# Patient Record
Sex: FEMALE | Race: WHITE | NOT HISPANIC OR LATINO | URBAN - METROPOLITAN AREA
[De-identification: names, ages, dates, MRNs, and addresses within clinical notes are randomized per-mention and may not be internally consistent; named-entity substitution may affect disease eponyms.]

---

## 2020-11-07 ENCOUNTER — INPATIENT (INPATIENT)
Facility: HOSPITAL | Age: 25
LOS: 9 days | Discharge: HOME | DRG: 393 | End: 2020-11-17
Attending: FAMILY MEDICINE | Admitting: STUDENT IN AN ORGANIZED HEALTH CARE EDUCATION/TRAINING PROGRAM
Payer: COMMERCIAL

## 2020-11-08 ENCOUNTER — TRANSCRIPTION ENCOUNTER (OUTPATIENT)
Age: 25
End: 2020-11-08

## 2020-11-08 VITALS
HEIGHT: 62 IN | RESPIRATION RATE: 18 BRPM | WEIGHT: 175.05 LBS | TEMPERATURE: 98 F | HEART RATE: 105 BPM | SYSTOLIC BLOOD PRESSURE: 86 MMHG | OXYGEN SATURATION: 100 % | DIASTOLIC BLOOD PRESSURE: 62 MMHG

## 2020-11-08 DIAGNOSIS — K91.86 RETAINED CHOLELITHIASIS FOLLOWING CHOLECYSTECTOMY: ICD-10-CM

## 2020-11-08 DIAGNOSIS — Z90.49 ACQUIRED ABSENCE OF OTHER SPECIFIED PARTS OF DIGESTIVE TRACT: Chronic | ICD-10-CM

## 2020-11-08 DIAGNOSIS — Z29.9 ENCOUNTER FOR PROPHYLACTIC MEASURES, UNSPECIFIED: ICD-10-CM

## 2020-11-08 DIAGNOSIS — K80.50 CALCULUS OF BILE DUCT WITHOUT CHOLANGITIS OR CHOLECYSTITIS WITHOUT OBSTRUCTION: ICD-10-CM

## 2020-11-08 DIAGNOSIS — R74.8 ABNORMAL LEVELS OF OTHER SERUM ENZYMES: ICD-10-CM

## 2020-11-08 DIAGNOSIS — Z98.890 OTHER SPECIFIED POSTPROCEDURAL STATES: Chronic | ICD-10-CM

## 2020-11-08 DIAGNOSIS — E87.6 HYPOKALEMIA: ICD-10-CM

## 2020-11-08 DIAGNOSIS — R10.9 UNSPECIFIED ABDOMINAL PAIN: ICD-10-CM

## 2020-11-08 LAB
ALBUMIN SERPL ELPH-MCNC: 3.4 G/DL — SIGNIFICANT CHANGE UP (ref 3.3–5)
ALBUMIN SERPL ELPH-MCNC: 3.5 G/DL — SIGNIFICANT CHANGE UP (ref 3.3–5)
ALP SERPL-CCNC: 356 U/L — HIGH (ref 40–120)
ALP SERPL-CCNC: 364 U/L — HIGH (ref 40–120)
ALT FLD-CCNC: 412 U/L — HIGH (ref 12–78)
ALT FLD-CCNC: 428 U/L — HIGH (ref 12–78)
AMYLASE P1 CFR SERPL: 32 U/L — SIGNIFICANT CHANGE UP (ref 25–125)
ANION GAP SERPL CALC-SCNC: 6 MMOL/L — SIGNIFICANT CHANGE UP (ref 5–17)
ANION GAP SERPL CALC-SCNC: 8 MMOL/L — SIGNIFICANT CHANGE UP (ref 5–17)
APTT BLD: 36.6 SEC — HIGH (ref 27.5–35.5)
AST SERPL-CCNC: 112 U/L — HIGH (ref 15–37)
AST SERPL-CCNC: 126 U/L — HIGH (ref 15–37)
BILIRUB DIRECT SERPL-MCNC: 2.5 MG/DL — HIGH (ref 0.05–0.2)
BILIRUB INDIRECT FLD-MCNC: 0.4 MG/DL — SIGNIFICANT CHANGE UP (ref 0.2–1)
BILIRUB SERPL-MCNC: 2.9 MG/DL — HIGH (ref 0.2–1.2)
BILIRUB SERPL-MCNC: 2.9 MG/DL — HIGH (ref 0.2–1.2)
BILIRUB SERPL-MCNC: 3.4 MG/DL — HIGH (ref 0.2–1.2)
BLD GP AB SCN SERPL QL: SIGNIFICANT CHANGE UP
BUN SERPL-MCNC: 7 MG/DL — SIGNIFICANT CHANGE UP (ref 7–23)
BUN SERPL-MCNC: 8 MG/DL — SIGNIFICANT CHANGE UP (ref 7–23)
CALCIUM SERPL-MCNC: 8.5 MG/DL — SIGNIFICANT CHANGE UP (ref 8.5–10.1)
CALCIUM SERPL-MCNC: 8.9 MG/DL — SIGNIFICANT CHANGE UP (ref 8.5–10.1)
CHLORIDE SERPL-SCNC: 103 MMOL/L — SIGNIFICANT CHANGE UP (ref 96–108)
CHLORIDE SERPL-SCNC: 105 MMOL/L — SIGNIFICANT CHANGE UP (ref 96–108)
CO2 SERPL-SCNC: 26 MMOL/L — SIGNIFICANT CHANGE UP (ref 22–31)
CO2 SERPL-SCNC: 28 MMOL/L — SIGNIFICANT CHANGE UP (ref 22–31)
CREAT SERPL-MCNC: 0.69 MG/DL — SIGNIFICANT CHANGE UP (ref 0.5–1.3)
CREAT SERPL-MCNC: 0.92 MG/DL — SIGNIFICANT CHANGE UP (ref 0.5–1.3)
GLUCOSE SERPL-MCNC: 105 MG/DL — HIGH (ref 70–99)
GLUCOSE SERPL-MCNC: 133 MG/DL — HIGH (ref 70–99)
HCG SERPL-ACNC: <1 MIU/ML — SIGNIFICANT CHANGE UP
HCT VFR BLD CALC: 37.7 % — SIGNIFICANT CHANGE UP (ref 34.5–45)
HCT VFR BLD CALC: 39 % — SIGNIFICANT CHANGE UP (ref 34.5–45)
HGB BLD-MCNC: 12.9 G/DL — SIGNIFICANT CHANGE UP (ref 11.5–15.5)
HGB BLD-MCNC: 13 G/DL — SIGNIFICANT CHANGE UP (ref 11.5–15.5)
INR BLD: 1.02 RATIO — SIGNIFICANT CHANGE UP (ref 0.88–1.16)
LIDOCAIN IGE QN: 132 U/L — SIGNIFICANT CHANGE UP (ref 73–393)
MCHC RBC-ENTMCNC: 28.6 PG — SIGNIFICANT CHANGE UP (ref 27–34)
MCHC RBC-ENTMCNC: 28.8 PG — SIGNIFICANT CHANGE UP (ref 27–34)
MCHC RBC-ENTMCNC: 33.1 GM/DL — SIGNIFICANT CHANGE UP (ref 32–36)
MCHC RBC-ENTMCNC: 34.5 GM/DL — SIGNIFICANT CHANGE UP (ref 32–36)
MCV RBC AUTO: 83.4 FL — SIGNIFICANT CHANGE UP (ref 80–100)
MCV RBC AUTO: 86.5 FL — SIGNIFICANT CHANGE UP (ref 80–100)
NRBC # BLD: 0 /100 WBCS — SIGNIFICANT CHANGE UP (ref 0–0)
NRBC # BLD: 0 /100 WBCS — SIGNIFICANT CHANGE UP (ref 0–0)
PLATELET # BLD AUTO: 356 K/UL — SIGNIFICANT CHANGE UP (ref 150–400)
PLATELET # BLD AUTO: 358 K/UL — SIGNIFICANT CHANGE UP (ref 150–400)
POTASSIUM SERPL-MCNC: 3.3 MMOL/L — LOW (ref 3.5–5.3)
POTASSIUM SERPL-MCNC: 4 MMOL/L — SIGNIFICANT CHANGE UP (ref 3.5–5.3)
POTASSIUM SERPL-SCNC: 3.3 MMOL/L — LOW (ref 3.5–5.3)
POTASSIUM SERPL-SCNC: 4 MMOL/L — SIGNIFICANT CHANGE UP (ref 3.5–5.3)
PROT SERPL-MCNC: 7.1 G/DL — SIGNIFICANT CHANGE UP (ref 6–8.3)
PROT SERPL-MCNC: 7.5 G/DL — SIGNIFICANT CHANGE UP (ref 6–8.3)
PROTHROM AB SERPL-ACNC: 11.9 SEC — SIGNIFICANT CHANGE UP (ref 10.6–13.6)
RBC # BLD: 4.51 M/UL — SIGNIFICANT CHANGE UP (ref 3.8–5.2)
RBC # BLD: 4.52 M/UL — SIGNIFICANT CHANGE UP (ref 3.8–5.2)
RBC # FLD: 13.8 % — SIGNIFICANT CHANGE UP (ref 10.3–14.5)
RBC # FLD: 13.8 % — SIGNIFICANT CHANGE UP (ref 10.3–14.5)
SARS-COV-2 IGG SERPL QL IA: NEGATIVE — SIGNIFICANT CHANGE UP
SARS-COV-2 IGM SERPL IA-ACNC: 0.09 INDEX — SIGNIFICANT CHANGE UP
SARS-COV-2 RNA SPEC QL NAA+PROBE: SIGNIFICANT CHANGE UP
SODIUM SERPL-SCNC: 137 MMOL/L — SIGNIFICANT CHANGE UP (ref 135–145)
SODIUM SERPL-SCNC: 139 MMOL/L — SIGNIFICANT CHANGE UP (ref 135–145)
WBC # BLD: 9.2 K/UL — SIGNIFICANT CHANGE UP (ref 3.8–10.5)
WBC # BLD: 9.21 K/UL — SIGNIFICANT CHANGE UP (ref 3.8–10.5)
WBC # FLD AUTO: 9.2 K/UL — SIGNIFICANT CHANGE UP (ref 3.8–10.5)
WBC # FLD AUTO: 9.21 K/UL — SIGNIFICANT CHANGE UP (ref 3.8–10.5)

## 2020-11-08 PROCEDURE — 99223 1ST HOSP IP/OBS HIGH 75: CPT | Mod: GC,AI

## 2020-11-08 PROCEDURE — 76705 ECHO EXAM OF ABDOMEN: CPT | Mod: 26

## 2020-11-08 PROCEDURE — 99285 EMERGENCY DEPT VISIT HI MDM: CPT

## 2020-11-08 RX ORDER — HYDROMORPHONE HYDROCHLORIDE 2 MG/ML
0.5 INJECTION INTRAMUSCULAR; INTRAVENOUS; SUBCUTANEOUS ONCE
Refills: 0 | Status: DISCONTINUED | OUTPATIENT
Start: 2020-11-08 | End: 2020-11-08

## 2020-11-08 RX ORDER — POLYETHYLENE GLYCOL 3350 17 G/17G
17 POWDER, FOR SOLUTION ORAL DAILY
Refills: 0 | Status: DISCONTINUED | OUTPATIENT
Start: 2020-11-08 | End: 2020-11-09

## 2020-11-08 RX ORDER — HYDROMORPHONE HYDROCHLORIDE 2 MG/ML
0.5 INJECTION INTRAMUSCULAR; INTRAVENOUS; SUBCUTANEOUS EVERY 6 HOURS
Refills: 0 | Status: DISCONTINUED | OUTPATIENT
Start: 2020-11-08 | End: 2020-11-09

## 2020-11-08 RX ORDER — OXYCODONE HYDROCHLORIDE 5 MG/1
0 TABLET ORAL
Qty: 0 | Refills: 0 | DISCHARGE

## 2020-11-08 RX ORDER — SENNA PLUS 8.6 MG/1
2 TABLET ORAL AT BEDTIME
Refills: 0 | Status: DISCONTINUED | OUTPATIENT
Start: 2020-11-08 | End: 2020-11-09

## 2020-11-08 RX ORDER — ONDANSETRON 8 MG/1
4 TABLET, FILM COATED ORAL ONCE
Refills: 0 | Status: COMPLETED | OUTPATIENT
Start: 2020-11-08 | End: 2020-11-08

## 2020-11-08 RX ORDER — SODIUM CHLORIDE 9 MG/ML
1000 INJECTION, SOLUTION INTRAVENOUS
Refills: 0 | Status: DISCONTINUED | OUTPATIENT
Start: 2020-11-08 | End: 2020-11-09

## 2020-11-08 RX ORDER — SODIUM CHLORIDE 9 MG/ML
1000 INJECTION INTRAMUSCULAR; INTRAVENOUS; SUBCUTANEOUS ONCE
Refills: 0 | Status: COMPLETED | OUTPATIENT
Start: 2020-11-08 | End: 2020-11-08

## 2020-11-08 RX ORDER — HYDROMORPHONE HYDROCHLORIDE 2 MG/ML
1 INJECTION INTRAMUSCULAR; INTRAVENOUS; SUBCUTANEOUS EVERY 6 HOURS
Refills: 0 | Status: DISCONTINUED | OUTPATIENT
Start: 2020-11-08 | End: 2020-11-09

## 2020-11-08 RX ORDER — ONDANSETRON 8 MG/1
4 TABLET, FILM COATED ORAL EVERY 4 HOURS
Refills: 0 | Status: DISCONTINUED | OUTPATIENT
Start: 2020-11-08 | End: 2020-11-09

## 2020-11-08 RX ORDER — DEXTROSE MONOHYDRATE, SODIUM CHLORIDE, AND POTASSIUM CHLORIDE 50; .745; 4.5 G/1000ML; G/1000ML; G/1000ML
1000 INJECTION, SOLUTION INTRAVENOUS
Refills: 0 | Status: DISCONTINUED | OUTPATIENT
Start: 2020-11-08 | End: 2020-11-10

## 2020-11-08 RX ORDER — KETOROLAC TROMETHAMINE 30 MG/ML
15 SYRINGE (ML) INJECTION EVERY 6 HOURS
Refills: 0 | Status: DISCONTINUED | OUTPATIENT
Start: 2020-11-08 | End: 2020-11-09

## 2020-11-08 RX ADMIN — HYDROMORPHONE HYDROCHLORIDE 0.5 MILLIGRAM(S): 2 INJECTION INTRAMUSCULAR; INTRAVENOUS; SUBCUTANEOUS at 02:27

## 2020-11-08 RX ADMIN — ONDANSETRON 4 MILLIGRAM(S): 8 TABLET, FILM COATED ORAL at 00:17

## 2020-11-08 RX ADMIN — POLYETHYLENE GLYCOL 3350 17 GRAM(S): 17 POWDER, FOR SOLUTION ORAL at 11:42

## 2020-11-08 RX ADMIN — SENNA PLUS 2 TABLET(S): 8.6 TABLET ORAL at 21:26

## 2020-11-08 RX ADMIN — HYDROMORPHONE HYDROCHLORIDE 0.5 MILLIGRAM(S): 2 INJECTION INTRAMUSCULAR; INTRAVENOUS; SUBCUTANEOUS at 03:27

## 2020-11-08 RX ADMIN — SODIUM CHLORIDE 75 MILLILITER(S): 9 INJECTION, SOLUTION INTRAVENOUS at 17:25

## 2020-11-08 RX ADMIN — SODIUM CHLORIDE 1000 MILLILITER(S): 9 INJECTION INTRAMUSCULAR; INTRAVENOUS; SUBCUTANEOUS at 02:27

## 2020-11-08 RX ADMIN — DEXTROSE MONOHYDRATE, SODIUM CHLORIDE, AND POTASSIUM CHLORIDE 80 MILLILITER(S): 50; .745; 4.5 INJECTION, SOLUTION INTRAVENOUS at 05:35

## 2020-11-08 RX ADMIN — HYDROMORPHONE HYDROCHLORIDE 1 MILLIGRAM(S): 2 INJECTION INTRAMUSCULAR; INTRAVENOUS; SUBCUTANEOUS at 05:16

## 2020-11-08 RX ADMIN — HYDROMORPHONE HYDROCHLORIDE 0.5 MILLIGRAM(S): 2 INJECTION INTRAMUSCULAR; INTRAVENOUS; SUBCUTANEOUS at 01:50

## 2020-11-08 RX ADMIN — SODIUM CHLORIDE 1000 MILLILITER(S): 9 INJECTION INTRAMUSCULAR; INTRAVENOUS; SUBCUTANEOUS at 00:18

## 2020-11-08 RX ADMIN — Medication 15 MILLIGRAM(S): at 15:05

## 2020-11-08 RX ADMIN — HYDROMORPHONE HYDROCHLORIDE 0.5 MILLIGRAM(S): 2 INJECTION INTRAMUSCULAR; INTRAVENOUS; SUBCUTANEOUS at 05:15

## 2020-11-08 RX ADMIN — Medication 15 MILLIGRAM(S): at 15:26

## 2020-11-08 RX ADMIN — HYDROMORPHONE HYDROCHLORIDE 1 MILLIGRAM(S): 2 INJECTION INTRAMUSCULAR; INTRAVENOUS; SUBCUTANEOUS at 07:23

## 2020-11-08 RX ADMIN — HYDROMORPHONE HYDROCHLORIDE 0.5 MILLIGRAM(S): 2 INJECTION INTRAMUSCULAR; INTRAVENOUS; SUBCUTANEOUS at 01:52

## 2020-11-08 RX ADMIN — Medication 15 MILLIGRAM(S): at 07:40

## 2020-11-08 RX ADMIN — HYDROMORPHONE HYDROCHLORIDE 0.5 MILLIGRAM(S): 2 INJECTION INTRAMUSCULAR; INTRAVENOUS; SUBCUTANEOUS at 00:15

## 2020-11-08 RX ADMIN — Medication 15 MILLIGRAM(S): at 09:00

## 2020-11-08 NOTE — PROGRESS NOTE ADULT - ASSESSMENT
24 YO F with PMHX cholestasis of pregnancy (2 years ago), cholecystitis s/p cholecystectomy (POD#2), admit for 5mm retained CBD stone.

## 2020-11-08 NOTE — ED PROVIDER NOTE - PSH
History of cholecystectomy     History of cholecystectomy    S/P D&C (status post dilation and curettage)  x2 for miscarriages

## 2020-11-08 NOTE — H&P ADULT - NSHPOUTPATIENTPROVIDERS_GEN_ALL_CORE
Dr. Brittani Corey Dr. Brittani Corey  [facility] ([place]) (patient unsure of name: Dr. Marroquin, female physician)  [facility] Dr. Brittani Coles([place]) (patient unsure of name: Dr. Marroquin, female physician)  [facility] Dr. Brittani Coles ([place]) (patient unsure of name: Dr. Marroquin, female physician)  [facility]

## 2020-11-08 NOTE — ED ADULT NURSE REASSESSMENT NOTE - NS ED NURSE REASSESS COMMENT FT1
pt aox4, " rt upper q abd pain, S/P gallbladder surgery 2 days ago" lungs clear, abd soft, + sounds, IVF infusing well via IV Pump,  pain 6/10, medicated, NPO, voiding

## 2020-11-08 NOTE — ED ADULT NURSE NOTE - PMH
No pertinent past medical history <<----- Click to add NO pertinent Past Medical History Area L Indication Text: Tumors in this location are included in Area L (trunk and extremities).  Mohs surgery is indicated for larger tumors, 2 cm or larger, in these anatomic locations.

## 2020-11-08 NOTE — H&P ADULT - NSHPREVIEWOFSYSTEMS_GEN_ALL_CORE
CONSTITUTIONAL: admits to fever, chills, dehydration, weakness  HEENT:  admits lightheadedness, dizziness, denies blurred visions, sore throat  SKIN: denies new lesions, rash  CARDIOVASCULAR: denies chest pain, chest pressure, palpitations  RESPIRATORY: denies shortness of breath, cough, sputum production  GASTROINTESTINAL: admits to nausea, vomiting, constipation, RUQ abdominal pain  GENITOURINARY: denies dysuria, frequency, hematuria  NEUROLOGICAL: denies numbness, headache, focal weakness  MUSCULOSKELETAL: denies new joint pain, muscle aches  HEMATOLOGIC: denies gross bleeding, bruising CONSTITUTIONAL: admits to chills, dehydration, weakness  HEENT:  admits lightheadedness, dizziness, denies blurred visions, sore throat  SKIN: denies new lesions, rash  CARDIOVASCULAR: denies chest pain, chest pressure, palpitations  RESPIRATORY: denies shortness of breath, cough, sputum production  GASTROINTESTINAL: admits to nausea, vomiting, constipation, RUQ abdominal pain  GENITOURINARY: denies dysuria, frequency, hematuria  NEUROLOGICAL: denies numbness, headache, focal weakness  MUSCULOSKELETAL: denies new joint pain, muscle aches  HEMATOLOGIC: denies gross bleeding, bruising

## 2020-11-08 NOTE — H&P ADULT - NSICDXPASTSURGICALHX_GEN_ALL_CORE_FT
PAST SURGICAL HISTORY:  History of cholecystectomy      PAST SURGICAL HISTORY:  History of cholecystectomy     S/P D&C (status post dilation and curettage) x2 for miscarriages

## 2020-11-08 NOTE — CONSULT NOTE ADULT - SUBJECTIVE AND OBJECTIVE BOX
Chief Complaint:  Patient is a 25y old  Female who presents with a chief complaint of retained CBD stone s/p cholecystectomy (08 Nov 2020 09:17)    H/O cholecystitis    History of cholestasis during pregnancy    No pertinent past medical history    S/P D&amp;C (status post dilation and curettage)    History of cholecystectomy       HPI:  24 YO F with PMHX cholestasis of pregnancy (2 years ago), cholecystitis s/p cholecystectomy (POD#2) presenting to the ER with acute RUQ pain since 1700. Pt states she had RUQ pain associated with uncontrolled vomiting x 1 month, underwent cholecystectomy 2 days ago at [facility]. Pt's RUQ pain did not subside after surgery, was associated with b/l flank pain, pain acutely worsened at 1700 last night, was on and off, sharp. Associated with non bilious non bloody emesis multiple times until pt began dry heaving. Pt took oxy 5 mg at approximately 1800 however vomited it as well. Pt admits to feeling very dehydrated, has not been able to keep down water, admits to associated lightheadedness. Pt states she also feels constipated, last BM 4 days ago, pt is passing gas. Last oral intake was a banana and toast which she vomited. Pt admits to associated chills. Pt denies sick contacts, sore throat, ear pain, muscle aches, CP SOB, cough. Of note, pt states she has a baseline BP ~110/60.     ED vitals afebrile, tachycardic , 86/62 after 1L IVF bolus BP increased to 121/65.   Labs significant for K 3.3, Bili 3.4, Alk P 356, , . US GB shows a 5 mm calculus in the region of the proximal common bile duct/cystic duct remnant which may represent retained stone.  Pt received 1L IVF bolus, Dilaudid .5 mg x 3, zofran 4 mg IV x1.    (08 Nov 2020 03:16)      cephalosporins (Rash (Moderate))  Cipro (Rash (Moderate))      HYDROmorphone  Injectable 1 milliGRAM(s) IV Push every 6 hours PRN  HYDROmorphone  Injectable 0.5 milliGRAM(s) IV Push every 6 hours PRN  ketorolac   Injectable 15 milliGRAM(s) IV Push every 6 hours PRN  lactated ringers. 1000 milliLiter(s) IV Continuous <Continuous>  ondansetron Injectable 4 milliGRAM(s) IV Push every 4 hours PRN  polyethylene glycol 3350 17 Gram(s) Oral daily  senna 2 Tablet(s) Oral at bedtime  sodium chloride 0.9% with potassium chloride 20 mEq/L 1000 milliLiter(s) IV Continuous <Continuous>        FAMILY HISTORY:  No pertinent family history in first degree relatives          Review of Systems:    General:  No wt loss, fevers, chills, night sweats,fatigue,   Eyes:  Good vision, no reported pain  ENT:  No sore throat, pain, runny nose, dysphagia  CV:  No pain, palpitatioins, hypo/hypertension  Resp:  No dyspnea, cough, tachypnea, wheezing  :  No pain, bleeding, incontinence, nocturia  Muscle:  No pain, weakness  Neuro:  No weakness, tingling, memory problems  Psych:  No fatigue, insomnia, mood problems, depression  Endocrine:  No polyuria, polydypsia, cold/heat intolerance  Heme:  No petechiae, ecchymosis, easy bruisability  Skin:  No rash, tattoos, scars, edema    Relevant Family History:       Relevant Social History:       Physical Exam:    Vital Signs:  Vital Signs Last 24 Hrs  T(C): 36.9 (08 Nov 2020 12:53), Max: 36.9 (08 Nov 2020 04:00)  T(F): 98.4 (08 Nov 2020 12:53), Max: 98.4 (08 Nov 2020 04:00)  HR: 90 (08 Nov 2020 12:53) (68 - 105)  BP: 96/64 (08 Nov 2020 12:53) (86/62 - 122/72)  BP(mean): --  RR: 17 (08 Nov 2020 12:53) (16 - 18)  SpO2: 92% (08 Nov 2020 12:53) (92% - 100%)  Daily Height in cm: 157.48 (08 Nov 2020 00:05)    Daily     General:  Appears stated age, well-groomed, well-nourished, no distress  HEENT:  NC/AT,  conjunctivae clear and pink, no thyromegaly, nodules, adenopathy, no JVD  Chest:  Full & symmetric excursion, no increased effort, breath sounds clear  Cardiovascular:  Regular rhythm, S1, S2, no murmur/rub/S3/S4, no abdominal bruit, no edema  Abdomen:  Soft, non-tender, non-distended, normoactive bowel sounds,  no masses ,no hepatosplenomeagaly, no signs of chronic liver disease  Extremities:  no cyanosis,clubbing or edema  Skin:  No rash/erythema/ecchymoses/petechiae/wounds/abscess/warm/dry  Neuro/Psych:  Alert, oriented, no asterixis, no tremor, no encephalopathy    Laboratory:                            12.9   9.21  )-----------( 356      ( 08 Nov 2020 05:18 )             39.0     11-08    139  |  105  |  7   ----------------------------<  105<H>  4.0   |  28  |  0.69    Ca    8.5      08 Nov 2020 05:18    TPro  7.1  /  Alb  3.4  /  TBili  2.9<H>  /  DBili  2.50<H>  /  AST  126<H>  /  ALT  412<H>  /  AlkPhos  364<H>  11-08    LIVER FUNCTIONS - ( 08 Nov 2020 05:18 )  Alb: 3.4 g/dL / Pro: 7.1 g/dL / ALK PHOS: 364 U/L / ALT: 412 U/L / AST: 126 U/L / GGT: x           PT/INR - ( 08 Nov 2020 00:12 )   PT: 11.9 sec;   INR: 1.02 ratio         PTT - ( 08 Nov 2020 00:12 )  PTT:36.6 sec    Amylase Serum32      Lipase [id number]       Ammonia--    Imaging:

## 2020-11-08 NOTE — ED PROVIDER NOTE - OBJECTIVE STATEMENT
24 y/o female s/p LAP cholecystectomy,  days-2 at [place]  c/o upper abdominal pain abdominal pain, back pain with nausea and vomiting x 1 day no CP, no SOB, no Fever, no chills, no urinary symptoms, no GYN symptoms, no COVID, no GIB

## 2020-11-08 NOTE — ED PROVIDER NOTE - SIGNIFICANT NEGATIVE FINDINGS
no headache, no chest pain, no SOB, no palpitations, no fever, no chills, , no urinary symptoms, no GI bleeding. no neuro changes.

## 2020-11-08 NOTE — ED ADULT NURSE REASSESSMENT NOTE - NS ED NURSE REASSESS COMMENT FT1
0150 No vomiting episode after Zofran , reports new onset of pain 8/10.  Medicated with Dilaudid 0.5mg as prescribed.

## 2020-11-08 NOTE — H&P ADULT - NSHPSOCIALHISTORY_GEN_ALL_CORE
occasional weed 1x a month socially, etoh socially 1-2x a month, denies other drug use   lives with mother  full code  did not get flu shot, declined flu shot during current hospitalization

## 2020-11-08 NOTE — H&P ADULT - NSHPPHYSICALEXAM_GEN_ALL_CORE
Vital Signs Last 24 Hrs  T(C): 36.8 (08 Nov 2020 00:05), Max: 36.8 (08 Nov 2020 00:05)  T(F): 98.2 (08 Nov 2020 00:05), Max: 98.2 (08 Nov 2020 00:05)  HR: 74 (08 Nov 2020 00:10) (74 - 105)  BP: 121/65 (08 Nov 2020 00:10) (86/62 - 121/65)  BP(mean): --  RR: 18 (08 Nov 2020 00:10) (18 - 18)  SpO2: 100% (08 Nov 2020 00:10) (100% - 100%)    Physical Exam:  General: Well developed, appears mildly uncomfortable  HEENT: Normocephallic Atraumatic, PERRLA, EOMI bl, dry mucous membranes   Neck: Supple, nontender, no cervical lymphadenopathy  Neurology: A&Ox3, no focal neurological deficits: CNII-XII intact  Respiratory: Clear To Auscultation B/L, No Wheezes, rhonchi or rales  CV: Regular Rate and Rhythm, +S1/S2, no murmurs, rubs or gallops  Abdominal: TTP RUQ, mild TTP RLQ>LLQ, three areas of ecchymoses in RUQ 1 cm x 1 cm, soft, Non-Distended decreased bowel soundsx4  Extremities: No Clubbing, cyanosis or edema, + peripheral pulses: cap refill <2 secs LE bilaterally  Skin: warm, dry, normal color

## 2020-11-08 NOTE — H&P ADULT - PROBLEM SELECTOR PLAN 1
abdominal pain 2/2 5mm retained CBD stone POD# 2 s/p cholecystectomy, with elevated total BR 3.4  s/p 1L IVF bolus, Dilaudid .5 mg x 3, zofran 4 mg IV x1  zofran 4 mg q6 PRN for nausea and or vomiting   NPO for likely ERCP tomorrow  start IVF rate 80 mL/hr with K added for 8 hours   ketorolac 15 mg IVP q 6 for mild pain, Dilaudid 1 mg q 6 hours PRN for moderate pain, Dilaudid 2 mg PRN for severe pain   consult surgery Dr. Kenney, consulted by ER   consult GI Dr. Nascimento -abdominal pain 2/2 5mm retained CBD stone POD# 2 s/p cholecystectomy, with elevated total BR 3.4  -US gallbladder 5 mm calculus in the region of the proximal common bile duct/cystic duct remnant which may represent retained stone.  -s/p 1L IVF bolus, Dilaudid .5 mg x 3, zofran 4 mg IV x1  -zofran 4 mg q6 PRN for nausea and or vomiting   -NPO for likely ERCP tomorrow  -start IVF rate 80 mL/hr with K added for 8 hours   -ketorolac 15 mg IVP q 6 for mild pain, Dilaudid 1 mg q 6 hours PRN for moderate pain, Dilaudid 2 mg PRN for severe pain   -consult surgery Dr. Kenney, consulted by ER   -consult GI Dr. Parnell -abdominal pain 2/2 5mm retained CBD stone POD# 2 s/p cholecystectomy, with elevated total BR 3.4  -US gallbladder 5 mm calculus in the region of the proximal common bile duct/cystic duct remnant which may represent retained stone.  -s/p 1L IVF bolus, Dilaudid .5 mg x 3, zofran 4 mg IV x1  -zofran 4 mg q6 PRN for nausea and or vomiting   -NPO for likely ERCP tomorrow  -start IVF rate 80 mL/hr with K added for 12 hours   -ketorolac 15 mg IVP q 6 for mild pain, Dilaudid .5 mg q 6 hours PRN for moderate pain, Dilaudid 1 mg PRN for severe pain   -consult surgery Dr. Kenney, consulted by ER   -consult GI Dr. Parnell -abdominal pain 2/2 5mm retained CBD stone POD# 2 s/p cholecystectomy, with elevated total BR 3.4  -US gallbladder 5 mm calculus in the region of the proximal common bile duct/cystic duct remnant which may represent retained stone.  -s/p 1L IVF bolus, Dilaudid .5 mg x 3, zofran 4 mg IV x1  -zofran 4 mg q6 PRN for nausea and or vomiting   -NPO for likely ERCP tomorrow  -start IVF rate 80 mL/hr with K added for 12 hours   -ketorolac 15 mg IVP q 6 for mild pain, Dilaudid .5 mg q 6 hours PRN for moderate pain, Dilaudid 1 mg PRN for severe pain   -consult surgery Dr. Kenney, consulted by ER   -consult GI Dr. Delgadillo -abdominal pain 2/2 5mm retained CBD stone POD# 2 s/p cholecystectomy, with elevated total bilirubin 3.4  -US gallbladder 5 mm calculus in the region of the proximal common bile duct/cystic duct remnant which may represent retained stone.  -s/p 1L IVF bolus, Dilaudid .5 mg x 3, zofran 4 mg IV x1  -zofran 4 mg q6 PRN for nausea and or vomiting   -NPO for likely ERCP tomorrow  -start IVF rate 80 mL/hr with K added for 12 hours   -ketorolac 15 mg IVP q 6 for mild pain, Dilaudid .5 mg q 6 hours PRN for moderate pain, Dilaudid 1 mg PRN for severe pain   -consult surgery Dr. Kenney, consulted by ER   -consulted GI Dr. Delgadillo

## 2020-11-08 NOTE — CONSULT NOTE ADULT - SUBJECTIVE AND OBJECTIVE BOX
DAVID DRISCOLL  MRN-[id number]  25y    THIS IS A 24 YO FEMALE WITH SIGNIFICANT PAST MEDICAL HISTORY OF CHOLECYSTITIS/ CHOLELITHIASIS S/P EMERGENT LAPAROSCOPIC CHOLECYSTECTOMY 11/06/2020 AT [facility] PRESENTING TO [facility] WITH RUQ SEVERE ABDOMINAL PAIN WITH NAUSEA. AFTER A MONTH OF ON/ OFF BILIARY COLIC SHE  UNDERWENT LAPAROSCOPIC CHOLECYSTECTOMY ON FRIDAY 11/06 AFTER HAVING A ABDOMINAL ULTRASOUND AND MRI AT [facility].    SHE ADMITS TO RUQ PAIN, NAUSEA. SHE DENIES ANY FEVER, CHILLS, JAUNDICE.          PAST MEDICAL & SURGICAL HISTORY:  H/O cholecystitis   History of cholestasis during pregnancy  S/P D&C (status post dilation and curettage) x2 for miscarriages  History of cholecystectomy 11/06/2020    Home Medications:  ibuprofen 200 mg oral tablet: 2 tab(s) orally every 6 hours, As Needed for pain  (08 Nov 2020 04:26)  oxyCODONE 5 mg oral tablet: 1 tab(s) orally every 6 hours, As Needed for pain  (08 Nov 2020 04:26)      Allergies  cephalosporins (Rash (Moderate))  Cipro (Rash (Moderate))    REVIEW OF SYSTEMS:    CONSTITUTIONAL: No weakness, fevers or chills  EYES/ENT: No visual changes;  No vertigo or throat pain   NECK: No pain or stiffness  RESPIRATORY: No cough, wheezing, hemoptysis; No shortness of breath  CARDIOVASCULAR: No chest pain or palpitations  GASTROINTESTINAL: SEE HPI  GENITOURINARY: No dysuria, frequency or hematuria  NEUROLOGICAL: No numbness or weakness  SKIN: No itching, rashes        Vital Signs (  T(C): 36.8 (11-08-20 @ 00:05), Max: 36.8 (11-08-20 @ 00:05)  HR: 74 (11-08-20 @ 00:10) (74 - 105)  BP: 121/65 (11-08-20 @ 00:10) (86/62 - 121/65)  RR: 18 (11-08-20 @ 00:10) (18 - 18)  SpO2: 100% (11-08-20 @ 00:10) (100% - 100%)  Daily Height in cm: 157.48 (08 Nov 2020 00:05)      PHYSICAL EXAM:    GENERAL: NAD  HEAD:  ATRAUMATIC, NORMOCEPHALIC  EYES: EOMI, PERRLA, CONJUNCTIVA AND SCLERA CLEAR   ENT: MOIST MUCOUS MEMBRANE   NECK: SUPPLE, NO JVD  CHEST/LUNG: CLEAR TO AUSCULTATION, NO W/R/R  HEART: REGULAR RATE, RHYTHM, NO MURMUR, RUBS, GALLOPS  ABDOMEN: UMBILICAL , EPIGASTRIC AND X2 RIGHT TROCAR SITE RECENT LAPAROSCOPIC CHOLECYSTECTOMY, + BS, SOFT, NOT DISTENDED, NO PALPABLE MASS, RUQ TENDERNESS,  NO GUARDING/ RIGIDITY. NO CVA  TENDERNESS   EXTREMITIES:  2+ PERIPHERAL PULSES, BRISK CAPILLARY REFILL. NO CLUBBING, CYANOSIS, OR EDEMA.   NERVOUS SYSTEM: ALERT AND ORIENTED X3, CLEAR SPEECH . NO DEFICITS   MUSCULOSKELETAL : FROM ALL 4 EXTREMITIES, FULL AND EQUAL STRENGTH   SKIN: NO RASH, INTACT                       13.0   9.20  )-----------( 358      ( 08 Nov 2020 00:12 )             37.7     08 Nov 2020 00:12    137    |  103    |  8      ----------------------------<  133    3.3     |  26     |  0.92     Ca    8.9        08 Nov 2020 00:12    TPro  7.5    /  Alb  3.5    /  TBili  3.4    /  DBili  x      /  AST  112    /  ALT  428    /  AlkPhos  356    08 Nov 2020 00:12    PT/INR - ( 08 Nov 2020 00:12 )   PT: 11.9 sec;   INR: 1.02 ratio     PTT - ( 08 Nov 2020 00:12 )  PTT:36.6 sec      EXAM:  US GALLBLADDER                       INTERPRETATION:  Clinical indication: Due to status post cholecystectomy. Right upper quadrant pain    TECHNIQUE: Gallbladder ultrasound was performed.    FINDINGS: Status post cholecystectomy. There is a 5 mm shadowing calculus in the region of the proximal common bile duct/cystic duct. This does not demonstrate mobility with decubitus position. No fluid collections.    IMPRESSION:    5 mm calculus in the region of the proximal common bile duct/cystic duct remnant which may represent retained stone.    TORO ALMARAZ MD; Attending Radiologist    ASSESSMENT AND PLAN     RETAINED STONE, CHOLEDOCHOLITHIASIS S/P LAPAROSCOPIC CHOLECYSTECTOMY 11/06/2020    NO SURGICAL INTERVENTION AT THIS POINT  NEED GI EVALUATION FOR MRCP, ERCP

## 2020-11-08 NOTE — CONSULT NOTE ADULT - ATTENDING COMMENTS
26 yo fem s/p lap eliza 2 days admitted with a retained CBD stone    Abd soft, NT  elevated lFTs    _GI consult for ERCP/ CBD stone removal

## 2020-11-08 NOTE — ED ADULT NURSE REASSESSMENT NOTE - NS ED NURSE REASSESS COMMENT FT1
0015 -[id number] Medicated with Dilaudid 0.5mg ivp, & Zofran given 4 mg ivp.  Pt is scheduled for Ultrasound Abdomen & Pelvis. Pt made aware

## 2020-11-08 NOTE — H&P ADULT - PROBLEM SELECTOR PLAN 3
Alk P 356, ,  in ER likely reactive in setting of retained CBD stone  monitor liver function, f/u CMP   avoid acetaminophen -Alk P 356, ,  in ER likely reactive in setting of retained CBD stone  -monitor liver function, f/u CMP   -avoid acetaminophen -Alk P 356, ,  in ER  in setting of retained CBD stone  -monitor liver function, f/u CMP   -avoid acetaminophen -Alk P 356, ,  in ER in setting of retained CBD stone  -monitor liver function, f/u CMP   -avoid acetaminophen

## 2020-11-08 NOTE — ED PROVIDER NOTE - CLINICAL SUMMARY MEDICAL DECISION MAKING FREE TEXT BOX
acute RUQ pain Post Op Day-2   sonogram  with retained stone,    admit to medicine , GI consult, surgical consult

## 2020-11-08 NOTE — H&P ADULT - PROBLEM SELECTOR PLAN 4
dvt ppx SCDs, encourage ambulation     IMPROVE VTE Individual Risk Assessment        RISK                                                          Points  [  ] Previous VTE                                                3  [  ] Thrombophilia                                             2  [  ] Lower limb paralysis                                   2        (unable to hold up >15 seconds)    [  ] Current Cancer                                            2         (within 6 months)  [  ] Immobilization > 24 hrs                              1  [  ] ICU/CCU stay > 24 hours                            1  [  ] Age > 60                                                    1  IMPROVE VTE Score __0_______

## 2020-11-08 NOTE — H&P ADULT - HISTORY OF PRESENT ILLNESS
26 YO F with PMHX cholecystitis s/p cholecystectomy (POD#2) presenting to the ER with acute RUQ pain since ___.      ED vitals afebrile, tachycardic , 86/62 after 1L IVF bolus BP increased to 121/65.   Labs significant for K 3.3, BR 3.4, Alk P 356, , . US GB shows a 5 mm calculus in the region of the proximal common bile duct/cystic duct remnant which may represent retained stone.  Pt received 1L IVF bolus, Dilaudid .5 mg x 3, zofran 4 mg IV x1.   EKG_________ 24 YO F with PMHX cholestasis of pregnancy (2 years ago), cholecystitis s/p cholecystectomy (POD#2) presenting to the ER with acute RUQ pain since 1700. Pt states she had RUQ pain associated with uncontrolled vomiting x 1 month, underwent cholecystectomy 2 days ago at [facility]. Pt's RUQ did not subside after surgery, was associated with b/l flank pain, pain acutely worsened at 1700 last night, was on and off, sharp. Associated with non bilious non bloody emesis multiple times until pt began dry heaving. Pt took oxy 5 mg at approximately 1800 however vomited it as well. Pt admits tp feeling very dehydrated, has not been able to keep down water, admits to associated lightheadedness. Pt states she also feels constipated, last BM 4 days ago, pt is passing gas. Last oral intake was a banana and toast which she vomited. Pt admits to associated fever, chills. Pt denies sick contacts, sore throat, ear pain, muscle aches, CP SOB, cough. Of note, pt states she has a baseline BP ~110/60.     ED vitals afebrile, tachycardic , 86/62 after 1L IVF bolus BP increased to 121/65.   Labs significant for K 3.3, BR 3.4, Alk P 356, , . US GB shows a 5 mm calculus in the region of the proximal common bile duct/cystic duct remnant which may represent retained stone.  Pt received 1L IVF bolus, Dilaudid .5 mg x 3, zofran 4 mg IV x1.    26 YO F with PMHX cholestasis of pregnancy (2 years ago), cholecystitis s/p cholecystectomy (POD#2) presenting to the ER with acute RUQ pain since 1700. Pt states she had RUQ pain associated with uncontrolled vomiting x 1 month, underwent cholecystectomy 2 days ago at [facility]. Pt's RUQ pain did not subside after surgery, was associated with b/l flank pain, pain acutely worsened at 1700 last night, was on and off, sharp. Associated with non bilious non bloody emesis multiple times until pt began dry heaving. Pt took oxy 5 mg at approximately 1800 however vomited it as well. Pt admits to feeling very dehydrated, has not been able to keep down water, admits to associated lightheadedness. Pt states she also feels constipated, last BM 4 days ago, pt is passing gas. Last oral intake was a banana and toast which she vomited. Pt admits to associated chills. Pt denies sick contacts, sore throat, ear pain, muscle aches, CP SOB, cough. Of note, pt states she has a baseline BP ~110/60.     ED vitals afebrile, tachycardic , 86/62 after 1L IVF bolus BP increased to 121/65.   Labs significant for K 3.3, Bili 3.4, Alk P 356, , . US GB shows a 5 mm calculus in the region of the proximal common bile duct/cystic duct remnant which may represent retained stone.  Pt received 1L IVF bolus, Dilaudid .5 mg x 3, zofran 4 mg IV x1.

## 2020-11-08 NOTE — PROGRESS NOTE ADULT - PROBLEM SELECTOR PLAN 1
-abdominal pain 2/2 5mm retained CBD stone POD# 3 s/p cholecystectomy at [place], with elevated total bilirubin 3.4 now downtrending  -US gallbladder 5 mm calculus in the region of the proximal common bile duct/cystic duct remnant which may represent retained stone.  -s/p 1L IVF bolus, Dilaudid .5 mg x 3, zofran 4 mg IV x1  -zofran 4 mg q6 PRN for nausea and or vomiting   -NPO for likely ERCP tomorrow  -start IVF rate 80 mL/hr with K added for 12 hours   -ketorolac 15 mg IVP q 6 for mild pain, Dilaudid .5 mg q 6 hours PRN for moderate pain, Dilaudid 1 mg PRN for severe pain   -consult surgery Dr. Kenney recs no surgical indication at this time  -consulted GI Dr. Ballard for mrcp vs ercp tomorrow

## 2020-11-08 NOTE — H&P ADULT - ASSESSMENT
26 YO F with PMHX cholecystitis s/p cholecystectomy admit for 5mm retained CBD stone. 24 YO F with PMHX cholestasis of pregnancy (2 years ago), cholecystitis s/p cholecystectomy (POD#2), admit for 5mm retained CBD stone.

## 2020-11-08 NOTE — ED PROVIDER NOTE - CARE PLAN
Principal Discharge DX:	Abdominal pain  Secondary Diagnosis:	Retained gallstones following open cholecystectomy

## 2020-11-08 NOTE — PROGRESS NOTE ADULT - SUBJECTIVE AND OBJECTIVE BOX
Patient is a 25y old  Female who presents with a chief complaint of retained CBD stone s/p cholecystectomy (08 Nov 2020 04:38)      INTERVAL HPI:  OVERNIGHT EVENTS:  T(F): 98 (11-08-20 @ 05:45), Max: 98.4 (11-08-20 @ 04:00)  HR: 74 (11-08-20 @ 07:10) (68 - 105)  BP: 121/69 (11-08-20 @ 07:10) (86/62 - 122/72)  RR: 16 (11-08-20 @ 07:10) (16 - 18)  SpO2: 99% (11-08-20 @ 07:10) (99% - 100%)  I&O's Summary      REVIEW OF SYSTEMS:  CONSTITUTIONAL: No fever, weight loss, or fatigue  EYES: No eye pain, visual disturbances, or discharge  ENMT:  No difficulty hearing, tinnitus, vertigo; No sinus or throat pain  NECK: No pain or stiffness  BREASTS: No pain, masses, or nipple discharge  RESPIRATORY: No cough, wheezing, chills or hemoptysis; No shortness of breath  CARDIOVASCULAR: No chest pain, palpitations, dizziness, or leg swelling  GASTROINTESTINAL: No abdominal or epigastric pain. No nausea, vomiting, or hematemesis; No diarrhea or constipation. No melena or hematochezia.  GENITOURINARY: No dysuria, frequency, hematuria, or incontinence  NEUROLOGICAL: No headaches, memory loss, loss of strength, numbness, or tremors  SKIN: No itching, burning, rashes, or lesions   LYMPH NODES: No enlarged glands  ENDOCRINE: No heat or cold intolerance; No hair loss  MUSCULOSKELETAL: No joint pain or swelling; No muscle, back, or extremity pain  PSYCHIATRIC: No depression, anxiety, mood swings, or difficulty sleeping  HEME/LYMPH: No easy bruising, or bleeding gums  ALLERY AND IMMUNOLOGIC: No hives or eczema    PHYSICAL EXAM:  GENERAL: NAD, well-groomed, well-developed  HEAD:  Atraumatic, Normocephalic  EYES: EOMI, PERRLA, conjunctiva and sclera clear  ENMT: No tonsillar erythema, exudates, or enlargement; Moist mucous membranes, Good dentition, No lesions  NECK: Supple, No JVD, Normal thyroid  NERVOUS SYSTEM:  Alert & Oriented X3, Good concentration; Motor Strength 5/5 B/L upper and lower extremities; DTRs 2+ intact and symmetric  CHEST/LUNG: Clear to percussion bilaterally; No rales, rhonchi, wheezing, or rubs  HEART: Regular rate and rhythm; No murmurs, rubs, or gallops  ABDOMEN: Soft, Nontender, Nondistended; Bowel sounds present  EXTREMITIES:  2+ Peripheral Pulses, No clubbing, cyanosis, or edema  LYMPH: No lymphadenopathy noted  SKIN: No rashes or lesions    LABS:                        12.9   9.21  )-----------( 356      ( 08 Nov 2020 05:18 )             39.0     11-08    139  |  105  |  7   ----------------------------<  105<H>  4.0   |  28  |  0.69    Ca    8.5      08 Nov 2020 05:18    TPro  7.1  /  Alb  3.4  /  TBili  2.9<H>  /  DBili  2.50<H>  /  AST  126<H>  /  ALT  412<H>  /  AlkPhos  364<H>  11-08    PT/INR - ( 08 Nov 2020 00:12 )   PT: 11.9 sec;   INR: 1.02 ratio         PTT - ( 08 Nov 2020 00:12 )  PTT:36.6 sec    CAPILLARY BLOOD GLUCOSE                  MEDICATIONS  (STANDING):  polyethylene glycol 3350 17 Gram(s) Oral daily  senna 2 Tablet(s) Oral at bedtime  sodium chloride 0.9% with potassium chloride 20 mEq/L 1000 milliLiter(s) (80 mL/Hr) IV Continuous <Continuous>    MEDICATIONS  (PRN):  HYDROmorphone  Injectable 1 milliGRAM(s) IV Push every 6 hours PRN Severe Pain (7 - 10)  HYDROmorphone  Injectable 0.5 milliGRAM(s) IV Push every 6 hours PRN Moderate Pain (4 - 6)  ketorolac   Injectable 15 milliGRAM(s) IV Push every 6 hours PRN Mild Pain (1 - 3)  ondansetron Injectable 4 milliGRAM(s) IV Push every 4 hours PRN Nausea and/or Vomiting       Patient is a 25y old  Female who presents with a chief complaint of retained CBD stone s/p cholecystectomy (08 Nov 2020 04:38)      INTERVAL HPI: Pt seen and examined. States she still has some pain with RUQ but pain med helps, denies any other acute complaints, states she is constipated has not gone in 4 days.     OVERNIGHT EVENTS: none noted  T(F): 98 (11-08-20 @ 05:45), Max: 98.4 (11-08-20 @ 04:00)  HR: 74 (11-08-20 @ 07:10) (68 - 105)  BP: 121/69 (11-08-20 @ 07:10) (86/62 - 122/72)  RR: 16 (11-08-20 @ 07:10) (16 - 18)  SpO2: 99% (11-08-20 @ 07:10) (99% - 100%)  I&O's Summary      REVIEW OF SYSTEMS:  CONSTITUTIONAL: No fever, weight loss, or fatigue  RESPIRATORY: No cough, wheezing, chills or hemoptysis; No shortness of breath  CARDIOVASCULAR: No chest pain, palpitations, dizziness, or leg swelling  GASTROINTESTINAL: RUQ abd pain, no nausea or vomiting, constipated for 4 days.  GENITOURINARY: No dysuria, frequency, hematuria, or incontinence  NEUROLOGICAL: No headaches, memory loss, loss of strength, numbness, or tremors  ENDOCRINE: No heat or cold intolerance; No hair loss  MUSCULOSKELETAL: No joint pain or swelling; No muscle, back, or extremity pain  PSYCHIATRIC: No depression, anxiety, mood swings, or difficulty sleeping      PHYSICAL EXAM:  GENERAL: NAD, well-groomed, obese  NERVOUS SYSTEM:  Alert & Oriented X3, Good concentration; Motor Strength 5/5 B/L upper and lower extremities  CHEST/LUNG: Clear to percussion bilaterally; No rales, rhonchi, wheezing, or rubs  HEART: Regular rate and rhythm; No murmurs, rubs, or gallops  ABDOMEN: Soft, Nontender, Nondistended; Bowel sounds hypoactive  EXTREMITIES:  2+ Peripheral Pulses, No clubbing, cyanosis, or edema  SKIN: No rashes or lesions    LABS:                        12.9   9.21  )-----------( 356      ( 08 Nov 2020 05:18 )             39.0     11-08    139  |  105  |  7   ----------------------------<  105<H>  4.0   |  28  |  0.69    Ca    8.5      08 Nov 2020 05:18    TPro  7.1  /  Alb  3.4  /  TBili  2.9<H>  /  DBili  2.50<H>  /  AST  126<H>  /  ALT  412<H>  /  AlkPhos  364<H>  11-08    PT/INR - ( 08 Nov 2020 00:12 )   PT: 11.9 sec;   INR: 1.02 ratio         PTT - ( 08 Nov 2020 00:12 )  PTT:36.6 sec    CAPILLARY BLOOD GLUCOSE                  MEDICATIONS  (STANDING):  polyethylene glycol 3350 17 Gram(s) Oral daily  senna 2 Tablet(s) Oral at bedtime  sodium chloride 0.9% with potassium chloride 20 mEq/L 1000 milliLiter(s) (80 mL/Hr) IV Continuous <Continuous>    MEDICATIONS  (PRN):  HYDROmorphone  Injectable 1 milliGRAM(s) IV Push every 6 hours PRN Severe Pain (7 - 10)  HYDROmorphone  Injectable 0.5 milliGRAM(s) IV Push every 6 hours PRN Moderate Pain (4 - 6)  ketorolac   Injectable 15 milliGRAM(s) IV Push every 6 hours PRN Mild Pain (1 - 3)  ondansetron Injectable 4 milliGRAM(s) IV Push every 4 hours PRN Nausea and/or Vomiting

## 2020-11-08 NOTE — H&P ADULT - PROBLEM SELECTOR PLAN 2
K 3.3 in ER likely 2/2 vomiting   replete with K 40 powder x 1  start IVF rate 80 mL/hr with K added for 8 hours   f/u CMP, replete as needed -K 3.3 in ER likely 2/2 vomiting   -hold off on oral K powder, unable to tolerate anything by mouth at this time   -start IVF rate 80 mL/hr with K added for 8 hours   -f/u CMP, replete as needed -K 3.3 in ER likely 2/2 vomiting   -hold off on oral K powder, unable to tolerate anything by mouth at this time   -start IVF rate 80 mL/hr with K added for 12 hours   -f/u CMP, replete as needed

## 2020-11-08 NOTE — ED ADULT NURSE NOTE - OBJECTIVE STATEMENT
A&OX4 c/c severe abdominal pain, & vomiting x 2 days s/post Laparoscopic Cholecystectomy. Pt crying, moaning, & retching. Steri strips to abdomen, right side upper & mid, redness. No respiratory distress.

## 2020-11-08 NOTE — ED ADULT NURSE REASSESSMENT NOTE - NS ED NURSE REASSESS COMMENT FT1
0535 Normal Saline 0.9 with KCL 20 mEq premixed started @80ml/ hr via pump.. Infusing well, reminded not to bend left arm. verbalized understanding.  Kept warm, several warm blankets provided. Ongoing monitoring maintained.

## 2020-11-09 ENCOUNTER — TRANSCRIPTION ENCOUNTER (OUTPATIENT)
Age: 25
End: 2020-11-09

## 2020-11-09 LAB
ALBUMIN SERPL ELPH-MCNC: 2.9 G/DL — LOW (ref 3.3–5)
ALP SERPL-CCNC: 404 U/L — HIGH (ref 40–120)
ALT FLD-CCNC: 357 U/L — HIGH (ref 12–78)
ANION GAP SERPL CALC-SCNC: 8 MMOL/L — SIGNIFICANT CHANGE UP (ref 5–17)
AST SERPL-CCNC: 136 U/L — HIGH (ref 15–37)
BASOPHILS # BLD AUTO: 0.05 K/UL — SIGNIFICANT CHANGE UP (ref 0–0.2)
BASOPHILS NFR BLD AUTO: 0.6 % — SIGNIFICANT CHANGE UP (ref 0–2)
BILIRUB SERPL-MCNC: 1.5 MG/DL — HIGH (ref 0.2–1.2)
BUN SERPL-MCNC: 11 MG/DL — SIGNIFICANT CHANGE UP (ref 7–23)
CALCIUM SERPL-MCNC: 8.9 MG/DL — SIGNIFICANT CHANGE UP (ref 8.5–10.1)
CHLORIDE SERPL-SCNC: 105 MMOL/L — SIGNIFICANT CHANGE UP (ref 96–108)
CO2 SERPL-SCNC: 25 MMOL/L — SIGNIFICANT CHANGE UP (ref 22–31)
CREAT SERPL-MCNC: 0.58 MG/DL — SIGNIFICANT CHANGE UP (ref 0.5–1.3)
EOSINOPHIL # BLD AUTO: 0.22 K/UL — SIGNIFICANT CHANGE UP (ref 0–0.5)
EOSINOPHIL NFR BLD AUTO: 2.8 % — SIGNIFICANT CHANGE UP (ref 0–6)
GLUCOSE SERPL-MCNC: 62 MG/DL — LOW (ref 70–99)
HCT VFR BLD CALC: 36.9 % — SIGNIFICANT CHANGE UP (ref 34.5–45)
HGB BLD-MCNC: 12.5 G/DL — SIGNIFICANT CHANGE UP (ref 11.5–15.5)
IMM GRANULOCYTES NFR BLD AUTO: 0.4 % — SIGNIFICANT CHANGE UP (ref 0–1.5)
LYMPHOCYTES # BLD AUTO: 2.41 K/UL — SIGNIFICANT CHANGE UP (ref 1–3.3)
LYMPHOCYTES # BLD AUTO: 30.3 % — SIGNIFICANT CHANGE UP (ref 13–44)
MCHC RBC-ENTMCNC: 28.7 PG — SIGNIFICANT CHANGE UP (ref 27–34)
MCHC RBC-ENTMCNC: 33.9 GM/DL — SIGNIFICANT CHANGE UP (ref 32–36)
MCV RBC AUTO: 84.8 FL — SIGNIFICANT CHANGE UP (ref 80–100)
MONOCYTES # BLD AUTO: 0.45 K/UL — SIGNIFICANT CHANGE UP (ref 0–0.9)
MONOCYTES NFR BLD AUTO: 5.7 % — SIGNIFICANT CHANGE UP (ref 2–14)
NEUTROPHILS # BLD AUTO: 4.79 K/UL — SIGNIFICANT CHANGE UP (ref 1.8–7.4)
NEUTROPHILS NFR BLD AUTO: 60.2 % — SIGNIFICANT CHANGE UP (ref 43–77)
NRBC # BLD: 0 /100 WBCS — SIGNIFICANT CHANGE UP (ref 0–0)
PLATELET # BLD AUTO: 323 K/UL — SIGNIFICANT CHANGE UP (ref 150–400)
POTASSIUM SERPL-MCNC: 3.7 MMOL/L — SIGNIFICANT CHANGE UP (ref 3.5–5.3)
POTASSIUM SERPL-SCNC: 3.7 MMOL/L — SIGNIFICANT CHANGE UP (ref 3.5–5.3)
PROCALCITONIN SERPL-MCNC: <0.05 — SIGNIFICANT CHANGE UP (ref 0–0.04)
PROT SERPL-MCNC: 6.4 G/DL — SIGNIFICANT CHANGE UP (ref 6–8.3)
RBC # BLD: 4.35 M/UL — SIGNIFICANT CHANGE UP (ref 3.8–5.2)
RBC # FLD: 14.1 % — SIGNIFICANT CHANGE UP (ref 10.3–14.5)
SODIUM SERPL-SCNC: 138 MMOL/L — SIGNIFICANT CHANGE UP (ref 135–145)
WBC # BLD: 7.95 K/UL — SIGNIFICANT CHANGE UP (ref 3.8–10.5)
WBC # FLD AUTO: 7.95 K/UL — SIGNIFICANT CHANGE UP (ref 3.8–10.5)

## 2020-11-09 PROCEDURE — 99233 SBSQ HOSP IP/OBS HIGH 50: CPT | Mod: GC

## 2020-11-09 RX ORDER — PANTOPRAZOLE SODIUM 20 MG/1
40 TABLET, DELAYED RELEASE ORAL
Refills: 0 | Status: DISCONTINUED | OUTPATIENT
Start: 2020-11-09 | End: 2020-11-10

## 2020-11-09 RX ORDER — SENNA PLUS 8.6 MG/1
2 TABLET ORAL AT BEDTIME
Refills: 0 | Status: DISCONTINUED | OUTPATIENT
Start: 2020-11-09 | End: 2020-11-12

## 2020-11-09 RX ORDER — TRAMADOL HYDROCHLORIDE 50 MG/1
25 TABLET ORAL ONCE
Refills: 0 | Status: DISCONTINUED | OUTPATIENT
Start: 2020-11-09 | End: 2020-11-10

## 2020-11-09 RX ADMIN — HYDROMORPHONE HYDROCHLORIDE 1 MILLIGRAM(S): 2 INJECTION INTRAMUSCULAR; INTRAVENOUS; SUBCUTANEOUS at 04:09

## 2020-11-09 RX ADMIN — SODIUM CHLORIDE 75 MILLILITER(S): 9 INJECTION, SOLUTION INTRAVENOUS at 06:47

## 2020-11-09 RX ADMIN — HYDROMORPHONE HYDROCHLORIDE 1 MILLIGRAM(S): 2 INJECTION INTRAMUSCULAR; INTRAVENOUS; SUBCUTANEOUS at 03:54

## 2020-11-09 RX ADMIN — Medication 30 MILLILITER(S): at 22:56

## 2020-11-09 NOTE — PROGRESS NOTE ADULT - ASSESSMENT
CHARTING IN PROGRESS    24 YO F with PMHX cholestasis of pregnancy (2 years ago), cholecystitis s/p cholecystectomy (POD#2), admit for 5mm retained CBD stone. 24 YO F with PMHX cholestasis of pregnancy (2 years ago), cholecystitis s/p cholecystectomy (POD#3), presented with abd pain and N/V postoperatively, admitted for 5mm retained CBD stone.

## 2020-11-09 NOTE — DISCHARGE NOTE PROVIDER - PROVIDER TOKENS
PROVIDER:[TOKEN:[[id number]],FOLLOWUP:[1 week]] PROVIDER:[TOKEN:[[id number]],FOLLOWUP:[1 week]],PROVIDER:[TOKEN:[[id number]],FOLLOWUP:[1 week]]

## 2020-11-09 NOTE — PROGRESS NOTE ADULT - PROBLEM SELECTOR PLAN 4
dvt ppx SCDs, encourage ambulation     IMPROVE VTE Individual Risk Assessment        RISK                                                          Points  [  ] Previous VTE                                                3  [  ] Thrombophilia                                             2  [  ] Lower limb paralysis                                   2        (unable to hold up >15 seconds)    [  ] Current Cancer                                            2         (within 6 months)  [  ] Immobilization > 24 hrs                              1  [  ] ICU/CCU stay > 24 hours                            1  [  ] Age > 60                                                    1  IMPROVE VTE Score __0_______ dvt ppx SCDs, encourage ambulation   will consider need for GI ppx if NSAID use increases    IMPROVE VTE Individual Risk Assessment        RISK                                                          Points  [  ] Previous VTE                                                3  [  ] Thrombophilia                                             2  [  ] Lower limb paralysis                                   2        (unable to hold up >15 seconds)    [  ] Current Cancer                                            2         (within 6 months)  [  ] Immobilization > 24 hrs                              1  [  ] ICU/CCU stay > 24 hours                            1  [  ] Age > 60                                                    1  IMPROVE VTE Score __0_______

## 2020-11-09 NOTE — PROGRESS NOTE ADULT - SUBJECTIVE AND OBJECTIVE BOX
Patient is a 25y old  Female who presents with a chief complaint of retained CBD stone s/p cholecystectomy (08 Nov 2020 20:28)      INTERVAL HPI/OVERNIGHT EVENTS: Patient seen and examined at bedside. No overnight events occurred. Patient has no complaints at this time. Denies fevers, chills, headache, lightheadedness, chest pain, dyspnea, abdominal pain, n/v/d/c.    MEDICATIONS  (STANDING):  lactated ringers. 1000 milliLiter(s) (75 mL/Hr) IV Continuous <Continuous>  polyethylene glycol 3350 17 Gram(s) Oral daily  senna 2 Tablet(s) Oral at bedtime  sodium chloride 0.9% with potassium chloride 20 mEq/L 1000 milliLiter(s) (80 mL/Hr) IV Continuous <Continuous>    MEDICATIONS  (PRN):  HYDROmorphone  Injectable 1 milliGRAM(s) IV Push every 6 hours PRN Severe Pain (7 - 10)  HYDROmorphone  Injectable 0.5 milliGRAM(s) IV Push every 6 hours PRN Moderate Pain (4 - 6)  ketorolac   Injectable 15 milliGRAM(s) IV Push every 6 hours PRN Mild Pain (1 - 3)  ondansetron Injectable 4 milliGRAM(s) IV Push every 4 hours PRN Nausea and/or Vomiting      Allergies    cephalosporins (Rash (Moderate))  Cipro (Rash (Moderate))    Intolerances        REVIEW OF SYSTEMS:  CONSTITUTIONAL: No fever or chills  HEENT:  No headache, no sore throat  RESPIRATORY: No cough, wheezing, or shortness of breath  CARDIOVASCULAR: No chest pain, palpitations  GASTROINTESTINAL: No abd pain, nausea, vomiting, or diarrhea  GENITOURINARY: No dysuria, frequency, or hematuria  NEUROLOGICAL: no focal weakness or dizziness  MUSCULOSKELETAL: no myalgias     Vital Signs Last 24 Hrs  T(C): 36.9 (09 Nov 2020 04:43), Max: 37.1 (08 Nov 2020 21:14)  T(F): 98.4 (09 Nov 2020 04:43), Max: 98.8 (08 Nov 2020 21:14)  HR: 72 (09 Nov 2020 04:43) (72 - 90)  BP: 94/54 (09 Nov 2020 04:43) (94/54 - 101/58)  BP(mean): --  RR: 16 (09 Nov 2020 04:43) (16 - 18)  SpO2: 95% (09 Nov 2020 04:43) (92% - 98%)    PHYSICAL EXAM:  GENERAL: NAD  HEENT:  anicteric, moist mucous membranes  CHEST/LUNG:  CTA b/l, no rales, wheezes, or rhonchi  HEART:  RRR, S1, S2  ABDOMEN:  BS+, soft, nontender, nondistended  EXTREMITIES: no edema, cyanosis, or calf tenderness  NERVOUS SYSTEM: answers questions and follows commands appropriately    LABS:                        12.5   7.95  )-----------( 323      ( 09 Nov 2020 08:38 )             36.9     CBC Full  -  ( 09 Nov 2020 08:38 )  WBC Count : 7.95 K/uL  Hemoglobin : 12.5 g/dL  Hematocrit : 36.9 %  Platelet Count - Automated : 323 K/uL  Mean Cell Volume : 84.8 fl  Mean Cell Hemoglobin : 28.7 pg  Mean Cell Hemoglobin Concentration : 33.9 gm/dL  Auto Neutrophil # : 4.79 K/uL  Auto Lymphocyte # : 2.41 K/uL  Auto Monocyte # : 0.45 K/uL  Auto Eosinophil # : 0.22 K/uL  Auto Basophil # : 0.05 K/uL  Auto Neutrophil % : 60.2 %  Auto Lymphocyte % : 30.3 %  Auto Monocyte % : 5.7 %  Auto Eosinophil % : 2.8 %  Auto Basophil % : 0.6 %    09 Nov 2020 08:38    138    |  105    |  11     ----------------------------<  62     3.7     |  25     |  0.58     Ca    8.9        09 Nov 2020 08:38    TPro  6.4    /  Alb  2.9    /  TBili  1.5    /  DBili  x      /  AST  136    /  ALT  357    /  AlkPhos  404    09 Nov 2020 08:38    PT/INR - ( 08 Nov 2020 00:12 )   PT: 11.9 sec;   INR: 1.02 ratio         PTT - ( 08 Nov 2020 00:12 )  PTT:36.6 sec    CAPILLARY BLOOD GLUCOSE              RADIOLOGY & ADDITIONAL TESTS:    Personally reviewed.     Consultant(s) Notes Reviewed:  [x] YES  [ ] NO     Patient is a 25y old  Female who presents with a chief complaint of RUQ pain and vomiting a/w retained CBD stone s/p cholecystectomy (POD #3).    INTERVAL HPI/OVERNIGHT EVENTS: Patient seen and examined at bedside. No overnight events occurred. Patient complains fo mild RUQ pain, improving since admission, possibly related to incisions. Patient has constipation with last BM a few days ago.Denies fevers, chills, headache, lightheadedness, chest pain, dyspnea, n/v/d. States that the flank pain present on admission has resolved.    MEDICATIONS  (STANDING):  lactated ringers. 1000 milliLiter(s) (75 mL/Hr) IV Continuous <Continuous>  polyethylene glycol 3350 17 Gram(s) Oral daily  senna 2 Tablet(s) Oral at bedtime  sodium chloride 0.9% with potassium chloride 20 mEq/L 1000 milliLiter(s) (80 mL/Hr) IV Continuous <Continuous>    MEDICATIONS  (PRN):  HYDROmorphone  Injectable 1 milliGRAM(s) IV Push every 6 hours PRN Severe Pain (7 - 10)  HYDROmorphone  Injectable 0.5 milliGRAM(s) IV Push every 6 hours PRN Moderate Pain (4 - 6)  ketorolac   Injectable 15 milliGRAM(s) IV Push every 6 hours PRN Mild Pain (1 - 3)  ondansetron Injectable 4 milliGRAM(s) IV Push every 4 hours PRN Nausea and/or Vomiting      Allergies    cephalosporins (Rash (Moderate))  Cipro (Rash (Moderate))    Intolerances        REVIEW OF SYSTEMS:  CONSTITUTIONAL: No fever or chills  HEENT:  No headache, no sore throat  RESPIRATORY: No cough, wheezing, or shortness of breath  CARDIOVASCULAR: No chest pain, palpitations  GASTROINTESTINAL: + RUQ pain possibly incisional, No nausea, vomiting, or diarrhea  GENITOURINARY: No dysuria, frequency, or hematuria  NEUROLOGICAL: no focal weakness or dizziness  MUSCULOSKELETAL: no myalgias     Vital Signs Last 24 Hrs  T(C): 36.9 (09 Nov 2020 04:43), Max: 37.1 (08 Nov 2020 21:14)  T(F): 98.4 (09 Nov 2020 04:43), Max: 98.8 (08 Nov 2020 21:14)  HR: 72 (09 Nov 2020 04:43) (72 - 90)  BP: 94/54 (09 Nov 2020 04:43) (94/54 - 101/58)  BP(mean): --  RR: 16 (09 Nov 2020 04:43) (16 - 18)  SpO2: 95% (09 Nov 2020 04:43) (92% - 98%)    PHYSICAL EXAM:  GENERAL: NAD, resting comfortably in bed  HEENT:  anicteric, moist mucous membranes, EOMI grossly intact  CHEST/LUNG:  CTA b/l, no rales, wheezes, or rhonchi  HEART:  RRR, S1, S2  ABDOMEN:  BS+, soft, mildy tender in RUQ to palpation, nontender in other quadrants, nondistended, 3 healing incisions RUQ  EXTREMITIES: no edema, cyanosis, or calf tenderness  NERVOUS SYSTEM: answers questions and follows commands appropriately    LABS:                        12.5   7.95  )-----------( 323      ( 09 Nov 2020 08:38 )             36.9     CBC Full  -  ( 09 Nov 2020 08:38 )  WBC Count : 7.95 K/uL  Hemoglobin : 12.5 g/dL  Hematocrit : 36.9 %  Platelet Count - Automated : 323 K/uL  Mean Cell Volume : 84.8 fl  Mean Cell Hemoglobin : 28.7 pg  Mean Cell Hemoglobin Concentration : 33.9 gm/dL  Auto Neutrophil # : 4.79 K/uL  Auto Lymphocyte # : 2.41 K/uL  Auto Monocyte # : 0.45 K/uL  Auto Eosinophil # : 0.22 K/uL  Auto Basophil # : 0.05 K/uL  Auto Neutrophil % : 60.2 %  Auto Lymphocyte % : 30.3 %  Auto Monocyte % : 5.7 %  Auto Eosinophil % : 2.8 %  Auto Basophil % : 0.6 %    09 Nov 2020 08:38    138    |  105    |  11     ----------------------------<  62     3.7     |  25     |  0.58     Ca    8.9        09 Nov 2020 08:38    TPro  6.4    /  Alb  2.9    /  TBili  1.5    /  DBili  x      /  AST  136    /  ALT  357    /  AlkPhos  404    09 Nov 2020 08:38    PT/INR - ( 08 Nov 2020 00:12 )   PT: 11.9 sec;   INR: 1.02 ratio         PTT - ( 08 Nov 2020 00:12 )  PTT:36.6 sec    CAPILLARY BLOOD GLUCOSE              RADIOLOGY & ADDITIONAL TESTS:    Personally reviewed.     Consultant(s) Notes Reviewed:  [x] YES  [ ] NO     Patient is a 25y old  Female who presents with a chief complaint of RUQ pain and vomiting a/w retained CBD stone s/p cholecystectomy (POD #3).    INTERVAL HPI/OVERNIGHT EVENTS: Patient seen and examined at bedside. No overnight events occurred. Patient complains fo mild RUQ pain, improving since admission, possibly related to incisions. Patient has constipation with last BM a few days ago. Denies fevers, chills, headache, lightheadedness, chest pain, dyspnea, n/v/d. States that the flank pain present on admission has resolved.    MEDICATIONS  (STANDING):  lactated ringers. 1000 milliLiter(s) (75 mL/Hr) IV Continuous <Continuous>  polyethylene glycol 3350 17 Gram(s) Oral daily  senna 2 Tablet(s) Oral at bedtime  sodium chloride 0.9% with potassium chloride 20 mEq/L 1000 milliLiter(s) (80 mL/Hr) IV Continuous <Continuous>    MEDICATIONS  (PRN):  HYDROmorphone  Injectable 1 milliGRAM(s) IV Push every 6 hours PRN Severe Pain (7 - 10)  HYDROmorphone  Injectable 0.5 milliGRAM(s) IV Push every 6 hours PRN Moderate Pain (4 - 6)  ketorolac   Injectable 15 milliGRAM(s) IV Push every 6 hours PRN Mild Pain (1 - 3)  ondansetron Injectable 4 milliGRAM(s) IV Push every 4 hours PRN Nausea and/or Vomiting      Allergies    cephalosporins (Rash (Moderate))  Cipro (Rash (Moderate))    Intolerances        REVIEW OF SYSTEMS:  CONSTITUTIONAL: No fever or chills  HEENT:  No headache, no sore throat  RESPIRATORY: No cough, wheezing, or shortness of breath  CARDIOVASCULAR: No chest pain, palpitations  GASTROINTESTINAL: + RUQ pain possibly incisional, No nausea, vomiting, or diarrhea  GENITOURINARY: No dysuria, frequency, or hematuria  NEUROLOGICAL: no focal weakness or dizziness  MUSCULOSKELETAL: no myalgias     Vital Signs Last 24 Hrs  T(C): 36.9 (09 Nov 2020 04:43), Max: 37.1 (08 Nov 2020 21:14)  T(F): 98.4 (09 Nov 2020 04:43), Max: 98.8 (08 Nov 2020 21:14)  HR: 72 (09 Nov 2020 04:43) (72 - 90)  BP: 94/54 (09 Nov 2020 04:43) (94/54 - 101/58)  RR: 16 (09 Nov 2020 04:43) (16 - 18)  SpO2: 95% (09 Nov 2020 04:43) (92% - 98%)    PHYSICAL EXAM:  GENERAL: NAD, resting comfortably in bed  HEENT:  anicteric, moist mucous membranes, EOMI grossly intact  CHEST/LUNG:  CTA b/l, no rales, wheezes, or rhonchi  HEART:  RRR, S1, S2  ABDOMEN:  BS+, soft, mildy tender in RUQ to palpation, nontender in other quadrants, nondistended, 3 healing incisions RUQ  EXTREMITIES: no edema, cyanosis, or calf tenderness  NERVOUS SYSTEM: answers questions and follows commands appropriately    LABS:                        12.5   7.95  )-----------( 323      ( 09 Nov 2020 08:38 )             36.9     CBC Full  -  ( 09 Nov 2020 08:38 )  WBC Count : 7.95 K/uL  Hemoglobin : 12.5 g/dL  Hematocrit : 36.9 %  Platelet Count - Automated : 323 K/uL  Mean Cell Volume : 84.8 fl  Mean Cell Hemoglobin : 28.7 pg  Mean Cell Hemoglobin Concentration : 33.9 gm/dL  Auto Neutrophil # : 4.79 K/uL  Auto Lymphocyte # : 2.41 K/uL  Auto Monocyte # : 0.45 K/uL  Auto Eosinophil # : 0.22 K/uL  Auto Basophil # : 0.05 K/uL  Auto Neutrophil % : 60.2 %  Auto Lymphocyte % : 30.3 %  Auto Monocyte % : 5.7 %  Auto Eosinophil % : 2.8 %  Auto Basophil % : 0.6 %    09 Nov 2020 08:38    138    |  105    |  11     ----------------------------<  62     3.7     |  25     |  0.58     Ca    8.9        09 Nov 2020 08:38    TPro  6.4    /  Alb  2.9    /  TBili  1.5    /  DBili  x      /  AST  136    /  ALT  357    /  AlkPhos  404    09 Nov 2020 08:38    PT/INR - ( 08 Nov 2020 00:12 )   PT: 11.9 sec;   INR: 1.02 ratio    PTT - ( 08 Nov 2020 00:12 )  PTT:36.6 sec      RADIOLOGY & ADDITIONAL TESTS: No new imaging.    Personally reviewed.     Consultant(s) Notes Reviewed:  [x] YES  [ ] NO     Patient is a 25y old  Female who presents with a chief complaint of RUQ pain and vomiting a/w retained CBD stone s/p cholecystectomy (POD #3).    INTERVAL HPI/OVERNIGHT EVENTS: Patient seen and examined at bedside. No overnight events occurred. Patient complains fo mild RUQ pain, improving since admission, possibly related to incisions. Patient has constipation with last BM a few days ago. Denies fevers, chills, headache, lightheadedness, chest pain, dyspnea, n/v/d. States that the flank pain present on admission has resolved.    MEDICATIONS  (STANDING):  lactated ringers. 1000 milliLiter(s) (75 mL/Hr) IV Continuous <Continuous>  polyethylene glycol 3350 17 Gram(s) Oral daily  senna 2 Tablet(s) Oral at bedtime  sodium chloride 0.9% with potassium chloride 20 mEq/L 1000 milliLiter(s) (80 mL/Hr) IV Continuous <Continuous>    MEDICATIONS  (PRN):  HYDROmorphone  Injectable 1 milliGRAM(s) IV Push every 6 hours PRN Severe Pain (7 - 10)  HYDROmorphone  Injectable 0.5 milliGRAM(s) IV Push every 6 hours PRN Moderate Pain (4 - 6)  ketorolac   Injectable 15 milliGRAM(s) IV Push every 6 hours PRN Mild Pain (1 - 3)  ondansetron Injectable 4 milliGRAM(s) IV Push every 4 hours PRN Nausea and/or Vomiting      Allergies    cephalosporins (Rash (Moderate))  Cipro (Rash (Moderate))    Intolerances        REVIEW OF SYSTEMS:  CONSTITUTIONAL: No fever or chills  HEENT:  No headache, no sore throat  RESPIRATORY: No cough, wheezing, or shortness of breath  CARDIOVASCULAR: No chest pain, palpitations  GASTROINTESTINAL: + RUQ pain possibly incisional, No nausea, vomiting, or diarrhea  GENITOURINARY: No dysuria, frequency, or hematuria  NEUROLOGICAL: no focal weakness or dizziness  MUSCULOSKELETAL: no myalgias     Vital Signs Last 24 Hrs  T(C): 36.9 (09 Nov 2020 04:43), Max: 37.1 (08 Nov 2020 21:14)  T(F): 98.4 (09 Nov 2020 04:43), Max: 98.8 (08 Nov 2020 21:14)  HR: 72 (09 Nov 2020 04:43) (72 - 90)  BP: 94/54 (09 Nov 2020 04:43) (94/54 - 101/58)  RR: 16 (09 Nov 2020 04:43) (16 - 18)  SpO2: 95% (09 Nov 2020 04:43) (92% - 98%)    PHYSICAL EXAM:  GENERAL: NAD, resting comfortably in bed  HEENT:  anicteric, moist mucous membranes, EOMI grossly intact  CHEST/LUNG:  CTA b/l, no rales, wheezes, or rhonchi  HEART:  RRR, S1, S2  ABDOMEN:  BS active, soft, mildly tender in RUQ to palpation, nontender in other quadrants, nondistended, 3 healing incisions RUQ  EXTREMITIES: no edema, cyanosis, or calf tenderness  NERVOUS SYSTEM: answers questions and follows commands appropriately    LABS:                        12.5   7.95  )-----------( 323      ( 09 Nov 2020 08:38 )             36.9     CBC Full  -  ( 09 Nov 2020 08:38 )  WBC Count : 7.95 K/uL  Hemoglobin : 12.5 g/dL  Hematocrit : 36.9 %  Platelet Count - Automated : 323 K/uL  Mean Cell Volume : 84.8 fl  Mean Cell Hemoglobin : 28.7 pg  Mean Cell Hemoglobin Concentration : 33.9 gm/dL  Auto Neutrophil # : 4.79 K/uL  Auto Lymphocyte # : 2.41 K/uL  Auto Monocyte # : 0.45 K/uL  Auto Eosinophil # : 0.22 K/uL  Auto Basophil # : 0.05 K/uL  Auto Neutrophil % : 60.2 %  Auto Lymphocyte % : 30.3 %  Auto Monocyte % : 5.7 %  Auto Eosinophil % : 2.8 %  Auto Basophil % : 0.6 %    09 Nov 2020 08:38    138    |  105    |  11     ----------------------------<  62     3.7     |  25     |  0.58     Ca    8.9        09 Nov 2020 08:38    TPro  6.4    /  Alb  2.9    /  TBili  1.5    /  DBili  x      /  AST  136    /  ALT  357    /  AlkPhos  404    09 Nov 2020 08:38    PT/INR - ( 08 Nov 2020 00:12 )   PT: 11.9 sec;   INR: 1.02 ratio    PTT - ( 08 Nov 2020 00:12 )  PTT:36.6 sec      RADIOLOGY & ADDITIONAL TESTS: No new imaging.    Personally reviewed.     Consultant(s) Notes Reviewed:  [x] YES  [ ] NO

## 2020-11-09 NOTE — PROGRESS NOTE ADULT - PROBLEM SELECTOR PLAN 1
-abdominal pain 2/2 5mm retained CBD stone POD# 2 s/p cholecystectomy, with elevated total bilirubin 3.4  -US gallbladder 5 mm calculus in the region of the proximal common bile duct/cystic duct remnant which may represent retained stone.  -s/p 1L IVF bolus, Dilaudid .5 mg x 3, zofran 4 mg IV x1  -zofran 4 mg q6 PRN for nausea and or vomiting   -NPO for likely ERCP tomorrow  -start IVF rate 80 mL/hr with K added for 12 hours   -ketorolac 15 mg IVP q 6 for mild pain, Dilaudid .5 mg q 6 hours PRN for moderate pain, Dilaudid 1 mg PRN for severe pain   -consult surgery Dr. Kenney, consulted by ER   -consulted GI Dr. Delgadillo -abdominal pain 2/2 5mm retained CBD stone POD# 2 s/p cholecystectomy, with elevated total bilirubin 3.4>>1.5 today (11/9)  -US gallbladder 5 mm calculus in the region of the proximal common bile duct/cystic duct remnant which may represent retained stone.  -zofran 4 mg q6 PRN for nausea and or vomiting   -NPO for likely ERCP today  -ketorolac 15 mg IVP q 6 for mild pain, Dilaudid .5 mg q 6 hours PRN for moderate pain, Dilaudid 1 mg PRN for severe pain   -consult surgery Dr. Kenney will f/u recs  -consult GI Dr. Delgadillo will f/u recs -abdominal pain 2/2 5mm retained CBD stone POD#3 s/p cholecystectomy, with initially elevated total bilirubin now resolving  -US gallbladder 5 mm calculus in the region of the proximal common bile duct/cystic duct remnant which may represent retained stone.  -zofran 4 mg q6 PRN for nausea and or vomiting   -NPO for likely ERCP today  -ketorolac 15 mg IVP q 6 for mild pain, Dilaudid .5 mg q 6 hours PRN for moderate pain, Dilaudid 1 mg PRN for severe pain   -surgery Dr. Kenney consulted, recommending no immediate surgical intervention at this time  -GI Dr. Delgadillo consulted, plan for ERCP today 11/9

## 2020-11-09 NOTE — PROGRESS NOTE ADULT - SUBJECTIVE AND OBJECTIVE BOX
s/p ercp es stone removal cbd stent placement   duodenal ulcers seen    plan  adv diet  if pain free can d/c home  in and out  ppi once a day for 30 days  repeat ercp/egd to remove the stent in 6 weeks  gerd precautions  pain management  repeat us and labs in 6 weeks prior to stent removal

## 2020-11-09 NOTE — DISCHARGE NOTE PROVIDER - HOSPITAL COURSE
FROM ADMISSION H+P:   HPI:  24 YO F with PMHX cholestasis of pregnancy (2 years ago), cholecystitis s/p cholecystectomy (POD#2) presenting to the ER with acute RUQ pain since 1700. Pt states she had RUQ pain associated with uncontrolled vomiting x 1 month, underwent cholecystectomy 2 days ago at [facility]. Pt's RUQ pain did not subside after surgery, was associated with b/l flank pain, pain acutely worsened at 1700 last night, was on and off, sharp. Associated with non bilious non bloody emesis multiple times until pt began dry heaving. Pt took oxy 5 mg at approximately 1800 however vomited it as well. Pt admits to feeling very dehydrated, has not been able to keep down water, admits to associated lightheadedness. Pt states she also feels constipated, last BM 4 days ago, pt is passing gas. Last oral intake was a banana and toast which she vomited. Pt admits to associated chills. Pt denies sick contacts, sore throat, ear pain, muscle aches, CP SOB, cough. Of note, pt states she has a baseline BP ~110/60.     ED vitals afebrile, tachycardic , 86/62 after 1L IVF bolus BP increased to 121/65.   Labs significant for K 3.3, Bili 3.4, Alk P 356, , . US GB shows a 5 mm calculus in the region of the proximal common bile duct/cystic duct remnant which may represent retained stone.  Pt received 1L IVF bolus, Dilaudid .5 mg x 3, zofran 4 mg IV x1.    (08 Nov 2020 03:16)      ---  HOSPITAL COURSE:   Patient was subsequently admitted to the general medical floor for further work up and management of her retained common bile duct stone. She was started on a pain regimen and received anti-emetics as needed. GI Dr. Delgadillo was consulted for further management and recommended ERPC. Patient was made NPO and ERCP showed ____. Elevated liver enzymes and alk phos were trended during her admission.    Patient was seen and examined on day of discharge:      Patient's clinical status improved during her hospital stay and is considered stable for discharge to home.    ---  CONSULTANTS:    Gastroenterology - Dr. Delgadillo    ---  TIME SPENT:  I, the attending physician, was physically present for the key portions of the evaluation and management (E/M) service provided. The total amount of time spent reviewing the hospital notes, laboratory values, imaging findings, assessing/counseling the patient, discussing with consultant physicians, social work, nursing staff was -- minutes    ---  Primary care provider was made aware of plan for discharge:      [  ] NO     [  ] YES   FROM ADMISSION H+P:   HPI:  26 YO F with PMHX cholestasis of pregnancy (2 years ago), cholecystitis s/p cholecystectomy (POD#2) presenting to the ER with acute RUQ pain since 1700. Pt states she had RUQ pain associated with uncontrolled vomiting x 1 month, underwent cholecystectomy 2 days ago at [facility]. Pt's RUQ pain did not subside after surgery, was associated with b/l flank pain, pain acutely worsened at 1700 last night, was on and off, sharp. Associated with non bilious non bloody emesis multiple times until pt began dry heaving. Pt took oxy 5 mg at approximately 1800 however vomited it as well. Pt admits to feeling very dehydrated, has not been able to keep down water, admits to associated lightheadedness. Pt states she also feels constipated, last BM 4 days ago, pt is passing gas. Last oral intake was a banana and toast which she vomited. Pt admits to associated chills. Pt denies sick contacts, sore throat, ear pain, muscle aches, CP SOB, cough. Of note, pt states she has a baseline BP ~110/60.     ED vitals afebrile, tachycardic , 86/62 after 1L IVF bolus BP increased to 121/65.   Labs significant for K 3.3, Bili 3.4, Alk P 356, , . US GB shows a 5 mm calculus in the region of the proximal common bile duct/cystic duct remnant which may represent retained stone.  Pt received 1L IVF bolus, Dilaudid .5 mg x 3, zofran 4 mg IV x1.    (08 Nov 2020 03:16)      ---  HOSPITAL COURSE:   Patient was subsequently admitted to the general medical floor for further work up and management of her retained common bile duct stone. She was started on a pain regimen and received anti-emetics as needed. GI Dr. Delgadillo was consulted for further management and recommended ERCP. Patient was made NPO and ERCP done with stone removal and CBD stent placement. Elevated liver enzymes and alk phos were trended during her admission.  Pain was hard to control, pain management consulted, and pain medications titrated. Patient's diet was advanced as per GI and patient tolerated well. Patient's pain improved and under control.    Patient's clinical status improved during her hospital stay and is considered stable for discharge to home.    ---  CONSULTANTS:    Gastroenterology - Dr. Elie RODRIGUEZ- Dr. Olea  Pain management- Dr. Ingram    ---  TIME SPENT:  I, the attending physician, was physically present for the key portions of the evaluation and management (E/M) service provided. The total amount of time spent reviewing the hospital notes, laboratory values, imaging findings, assessing/counseling the patient, discussing with consultant physicians, social work, nursing staff was 42 minutes

## 2020-11-09 NOTE — DISCHARGE NOTE PROVIDER - CARE PROVIDER_API CALL
Brittani Corey)  Internal Medicine  [address] [place] [address]  Phone: [phone]  Fax: [phone]  Follow Up Time: 1 week   Brittani Corey)  Internal Medicine  [address] [place] [address]  Phone: [phone]  Fax: [phone]  Follow Up Time: 1 week    Tom Delgadillo ()  Internal Medicine  [address] [place] [address]  Phone: [phone]  Fax: [phone]  Follow Up Time: 1 week

## 2020-11-09 NOTE — SBIRT NOTE ADULT - NSSBIRTALCPOSREINDET_GEN_A_CORE
SW provided positive reinforcement at bedside about mindful consumption, no referral needed at this time.

## 2020-11-09 NOTE — DISCHARGE NOTE PROVIDER - NSDCCPCAREPLAN_GEN_ALL_CORE_FT
PRINCIPAL DISCHARGE DIAGNOSIS  Diagnosis: Choledocholithiasis  Assessment and Plan of Treatment: You were found on imaging to have a retained gallstone in the common bile duct that connects your liver, gallbladder, and pancreas to your intestine. You were evaluated by a gastroenterologist who recommended a procedure called ERCP to search for and possibly remove the stone. The procedure _______. Follow up with your PCP and GI specialist for further monitoring and management.      SECONDARY DISCHARGE DIAGNOSES  Diagnosis: Elevated liver enzymes  Assessment and Plan of Treatment: Labwork showed elevated liver enzymes which indicates that your liver function was affected by the obstruction caused by the retained gallstone. During your hospital stay, liver function improved. Follow up with your PCP and GI specialist for further monitoring and management.    Diagnosis: Hypokalemia  Assessment and Plan of Treatment: Labwork showed a low potassium level in your blood when you first came to the hospital likely the consequence of nausea and vomiting. You were given a potassium supplement and this resolved.     PRINCIPAL DISCHARGE DIAGNOSIS  Diagnosis: Choledocholithiasis  Assessment and Plan of Treatment: You were found on imaging to have a retained gallstone in the common bile duct that connects your liver, gallbladder, and pancreas to your intestine. You were evaluated by a gastroenterologist who recommended a procedure called ERCP to search for and possibly remove the stone. ERCP done with stone removal and stent placement. Follow up with your PCP and GI specialist for further monitoring and management. You need to follow up with GI to remove the stent and repeat imaging.      SECONDARY DISCHARGE DIAGNOSES  Diagnosis: Hypokalemia  Assessment and Plan of Treatment: Labwork showed a low potassium level in your blood when you first came to the hospital likely the consequence of nausea and vomiting. You were given a potassium supplement and this resolved.    Diagnosis: Acute pancreatitis, unspecified complication status, unspecified pancreatitis type  Assessment and Plan of Treatment: Pain was controlled with pain medications. Follow up with PCP/GI within 2 weeks    Diagnosis: Elevated liver enzymes  Assessment and Plan of Treatment: Labwork showed elevated liver enzymes which indicates that your liver function was affected by the obstruction caused by the retained gallstone. During your hospital stay, liver function improved. Follow up with your PCP and GI specialist for further monitoring and management.

## 2020-11-09 NOTE — PROGRESS NOTE ADULT - PROBLEM SELECTOR PLAN 3
-Alk P 356, ,  in ER in setting of retained CBD stone  -monitor liver function, f/u CMP   -avoid acetaminophen -in setting of retained CBD stone  -monitor liver function, f/u CMP   -avoid acetaminophen -in setting of retained CBD stone, with mild improvement in AST/ALT, alk phos remains elevated  -monitor liver function, f/u CMP   -avoid acetaminophen

## 2020-11-09 NOTE — DISCHARGE NOTE PROVIDER - NSDCMRMEDTOKEN_GEN_ALL_CORE_FT
ibuprofen 200 mg oral tablet: 2 tab(s) orally every 6 hours, As Needed for pain   oxyCODONE 5 mg oral tablet: 1 tab(s) orally every 6 hours, As Needed for pain    acetaminophen 325 mg oral tablet: 2 tab(s) orally every 6 hours, As needed, Temp greater or equal to 38C (100.4F)  acetaminophen 325 mg oral tablet: 2 tab(s) orally every 6 hours, As needed, Mild Pain (1 - 3)  oxycodone-acetaminophen 5 mg-325 mg oral tablet: 1 tab(s) orally every 6 hours, As needed, Severe Pain (7 - 10) MDD:4 tabs  pantoprazole 40 mg oral delayed release tablet: 1 tab(s) orally once a day (before a meal)  polyethylene glycol 3350 oral powder for reconstitution: 17 gram(s) orally once a day, As Needed

## 2020-11-09 NOTE — PROGRESS NOTE ADULT - PROBLEM SELECTOR PLAN 2
-K 3.3 in ER likely 2/2 vomiting   -hold off on oral K powder, unable to tolerate anything by mouth at this time   -start IVF rate 80 mL/hr with K added for 12 hours   -f/u CMP, replete as needed -resolved  -K 3.3 in ER likely 2/2 vomiting now 3.7 (11/9)  -f/u CMP, replete as needed

## 2020-11-09 NOTE — CHART NOTE - NSCHARTNOTEFT_GEN_A_CORE
Called by RN for patient c/o abdominal discomfort. Patient s/p ERCP today. Patient seen and examined at bedside. States she feels bloated and passing lots of gas, belching frequently. Rates her discomfort as a 4/10 in severity. Patient given Maalox without relief. Called by RN s/p Maalox for worsening abdominal discomfort. States it has traveled to her flanks now, rating it a 6/10 in severity. Patient also concerned she has not had a BM in 4 days. Denies any N/V/D.        T(C): 36.9 (11-09-20 @ 20:33), Max: 37.2 (11-09-20 @ 12:37)  HR: 71 (11-09-20 @ 20:33) (71 - 101)  BP: 106/71 (11-09-20 @ 20:33) (91/53 - 131/65)  RR: 16 (11-09-20 @ 20:33) (14 - 20)  SpO2: 94% (11-09-20 @ 20:33) (94% - 100%)  Wt(kg): --    Physical :  Gen- NAD, ncat, resting in bed   Abdomen- +BS, mildly tender to palpation epigastric region, no guarding, no rebound      LABS:                        12.5   7.95  )-----------( 323      ( 09 Nov 2020 08:38 )             36.9     11-09    138  |  105  |  11  ----------------------------<  62<L>  3.7   |  25  |  0.58    Ca    8.9      09 Nov 2020 08:38    TPro  6.4  /  Alb  2.9<L>  /  TBili  1.5<H>  /  DBili  x   /  AST  136<H>  /  ALT  357<H>  /  AlkPhos  404<H>  11-09    PT/INR - ( 08 Nov 2020 00:12 )   PT: 11.9 sec;   INR: 1.02 ratio         PTT - ( 08 Nov 2020 00:12 )  PTT:36.6 sec            Assessment/Plan  25yFemale admitted for 5mm retained CBD stone s/p ERCP today. Patient c/o abdominal discomfort, bloating, given Maalox without relief, discomfort now worsening, rated 6/10.  -Patient with hx of duodenol ulcers seen on ERCP today, unable to give Toradol. Patient denies wanting Dilaudid, states she did not like how it made her feel when she previously had it  -Will give Tramadol 25mg PO x1 for pain/discomfort  -Will give senna 2 tablets for constipation  -Will continue to monitor patient  -Nurse to call with any concerns or changes

## 2020-11-10 DIAGNOSIS — K85.90 ACUTE PANCREATITIS WITHOUT NECROSIS OR INFECTION, UNSPECIFIED: ICD-10-CM

## 2020-11-10 DIAGNOSIS — Z29.9 ENCOUNTER FOR PROPHYLACTIC MEASURES, UNSPECIFIED: ICD-10-CM

## 2020-11-10 LAB
ALBUMIN SERPL ELPH-MCNC: 3.1 G/DL — LOW (ref 3.3–5)
ALP SERPL-CCNC: 469 U/L — HIGH (ref 40–120)
ALT FLD-CCNC: 325 U/L — HIGH (ref 12–78)
ANION GAP SERPL CALC-SCNC: 6 MMOL/L — SIGNIFICANT CHANGE UP (ref 5–17)
AST SERPL-CCNC: 99 U/L — HIGH (ref 15–37)
BILIRUB SERPL-MCNC: 0.9 MG/DL — SIGNIFICANT CHANGE UP (ref 0.2–1.2)
BUN SERPL-MCNC: 14 MG/DL — SIGNIFICANT CHANGE UP (ref 7–23)
CALCIUM SERPL-MCNC: 8.4 MG/DL — LOW (ref 8.5–10.1)
CHLORIDE SERPL-SCNC: 106 MMOL/L — SIGNIFICANT CHANGE UP (ref 96–108)
CO2 SERPL-SCNC: 28 MMOL/L — SIGNIFICANT CHANGE UP (ref 22–31)
CREAT SERPL-MCNC: 0.77 MG/DL — SIGNIFICANT CHANGE UP (ref 0.5–1.3)
GLUCOSE SERPL-MCNC: 135 MG/DL — HIGH (ref 70–99)
HCT VFR BLD CALC: 39.1 % — SIGNIFICANT CHANGE UP (ref 34.5–45)
HGB BLD-MCNC: 13.3 G/DL — SIGNIFICANT CHANGE UP (ref 11.5–15.5)
LACTATE SERPL-SCNC: 0.7 MMOL/L — SIGNIFICANT CHANGE UP (ref 0.7–2)
LIDOCAIN IGE QN: HIGH U/L (ref 73–393)
LIDOCAIN IGE QN: HIGH U/L (ref 73–393)
MCHC RBC-ENTMCNC: 28.5 PG — SIGNIFICANT CHANGE UP (ref 27–34)
MCHC RBC-ENTMCNC: 34 GM/DL — SIGNIFICANT CHANGE UP (ref 32–36)
MCV RBC AUTO: 83.9 FL — SIGNIFICANT CHANGE UP (ref 80–100)
NRBC # BLD: 0 /100 WBCS — SIGNIFICANT CHANGE UP (ref 0–0)
PLATELET # BLD AUTO: 359 K/UL — SIGNIFICANT CHANGE UP (ref 150–400)
POTASSIUM SERPL-MCNC: 3.7 MMOL/L — SIGNIFICANT CHANGE UP (ref 3.5–5.3)
POTASSIUM SERPL-SCNC: 3.7 MMOL/L — SIGNIFICANT CHANGE UP (ref 3.5–5.3)
PROT SERPL-MCNC: 7 G/DL — SIGNIFICANT CHANGE UP (ref 6–8.3)
RBC # BLD: 4.66 M/UL — SIGNIFICANT CHANGE UP (ref 3.8–5.2)
RBC # FLD: 14.2 % — SIGNIFICANT CHANGE UP (ref 10.3–14.5)
SODIUM SERPL-SCNC: 140 MMOL/L — SIGNIFICANT CHANGE UP (ref 135–145)
WBC # BLD: 16.35 K/UL — HIGH (ref 3.8–10.5)
WBC # FLD AUTO: 16.35 K/UL — HIGH (ref 3.8–10.5)

## 2020-11-10 PROCEDURE — 74176 CT ABD & PELVIS W/O CONTRAST: CPT | Mod: 26

## 2020-11-10 PROCEDURE — 99233 SBSQ HOSP IP/OBS HIGH 50: CPT | Mod: GC

## 2020-11-10 PROCEDURE — 99223 1ST HOSP IP/OBS HIGH 75: CPT

## 2020-11-10 RX ORDER — HYDROMORPHONE HYDROCHLORIDE 2 MG/ML
1 INJECTION INTRAMUSCULAR; INTRAVENOUS; SUBCUTANEOUS ONCE
Refills: 0 | Status: DISCONTINUED | OUTPATIENT
Start: 2020-11-10 | End: 2020-11-10

## 2020-11-10 RX ORDER — ONDANSETRON 8 MG/1
4 TABLET, FILM COATED ORAL ONCE
Refills: 0 | Status: COMPLETED | OUTPATIENT
Start: 2020-11-10 | End: 2020-11-10

## 2020-11-10 RX ORDER — POLYETHYLENE GLYCOL 3350 17 G/17G
17 POWDER, FOR SOLUTION ORAL DAILY
Refills: 0 | Status: DISCONTINUED | OUTPATIENT
Start: 2020-11-10 | End: 2020-11-12

## 2020-11-10 RX ORDER — METOCLOPRAMIDE HCL 10 MG
5 TABLET ORAL EVERY 6 HOURS
Refills: 0 | Status: COMPLETED | OUTPATIENT
Start: 2020-11-10 | End: 2020-11-12

## 2020-11-10 RX ORDER — PANTOPRAZOLE SODIUM 20 MG/1
40 TABLET, DELAYED RELEASE ORAL DAILY
Refills: 0 | Status: DISCONTINUED | OUTPATIENT
Start: 2020-11-10 | End: 2020-11-14

## 2020-11-10 RX ORDER — HYDROMORPHONE HYDROCHLORIDE 2 MG/ML
0.5 INJECTION INTRAMUSCULAR; INTRAVENOUS; SUBCUTANEOUS ONCE
Refills: 0 | Status: DISCONTINUED | OUTPATIENT
Start: 2020-11-10 | End: 2020-11-10

## 2020-11-10 RX ORDER — ONDANSETRON 8 MG/1
4 TABLET, FILM COATED ORAL EVERY 4 HOURS
Refills: 0 | Status: DISCONTINUED | OUTPATIENT
Start: 2020-11-10 | End: 2020-11-17

## 2020-11-10 RX ORDER — LANOLIN ALCOHOL/MO/W.PET/CERES
3 CREAM (GRAM) TOPICAL ONCE
Refills: 0 | Status: COMPLETED | OUTPATIENT
Start: 2020-11-10 | End: 2020-11-10

## 2020-11-10 RX ORDER — SODIUM CHLORIDE 9 MG/ML
1000 INJECTION INTRAMUSCULAR; INTRAVENOUS; SUBCUTANEOUS
Refills: 0 | Status: DISCONTINUED | OUTPATIENT
Start: 2020-11-10 | End: 2020-11-10

## 2020-11-10 RX ORDER — METOCLOPRAMIDE HCL 10 MG
10 TABLET ORAL ONCE
Refills: 0 | Status: COMPLETED | OUTPATIENT
Start: 2020-11-10 | End: 2020-11-10

## 2020-11-10 RX ORDER — KETOROLAC TROMETHAMINE 30 MG/ML
30 SYRINGE (ML) INJECTION ONCE
Refills: 0 | Status: DISCONTINUED | OUTPATIENT
Start: 2020-11-10 | End: 2020-11-10

## 2020-11-10 RX ORDER — HYDROMORPHONE HYDROCHLORIDE 2 MG/ML
0.5 INJECTION INTRAMUSCULAR; INTRAVENOUS; SUBCUTANEOUS EVERY 6 HOURS
Refills: 0 | Status: DISCONTINUED | OUTPATIENT
Start: 2020-11-10 | End: 2020-11-10

## 2020-11-10 RX ORDER — KETOROLAC TROMETHAMINE 30 MG/ML
15 SYRINGE (ML) INJECTION EVERY 6 HOURS
Refills: 0 | Status: DISCONTINUED | OUTPATIENT
Start: 2020-11-10 | End: 2020-11-10

## 2020-11-10 RX ORDER — HYDROMORPHONE HYDROCHLORIDE 2 MG/ML
1 INJECTION INTRAMUSCULAR; INTRAVENOUS; SUBCUTANEOUS EVERY 4 HOURS
Refills: 0 | Status: DISCONTINUED | OUTPATIENT
Start: 2020-11-10 | End: 2020-11-10

## 2020-11-10 RX ORDER — SODIUM CHLORIDE 9 MG/ML
1000 INJECTION INTRAMUSCULAR; INTRAVENOUS; SUBCUTANEOUS
Refills: 0 | Status: DISCONTINUED | OUTPATIENT
Start: 2020-11-10 | End: 2020-11-14

## 2020-11-10 RX ORDER — HYDROMORPHONE HYDROCHLORIDE 2 MG/ML
0.3 INJECTION INTRAMUSCULAR; INTRAVENOUS; SUBCUTANEOUS
Qty: 100 | Refills: 0 | Status: DISCONTINUED | OUTPATIENT
Start: 2020-11-10 | End: 2020-11-12

## 2020-11-10 RX ADMIN — ONDANSETRON 4 MILLIGRAM(S): 8 TABLET, FILM COATED ORAL at 03:46

## 2020-11-10 RX ADMIN — HYDROMORPHONE HYDROCHLORIDE 0.3 MG/HR: 2 INJECTION INTRAMUSCULAR; INTRAVENOUS; SUBCUTANEOUS at 18:23

## 2020-11-10 RX ADMIN — HYDROMORPHONE HYDROCHLORIDE 0.5 MILLIGRAM(S): 2 INJECTION INTRAMUSCULAR; INTRAVENOUS; SUBCUTANEOUS at 04:44

## 2020-11-10 RX ADMIN — TRAMADOL HYDROCHLORIDE 25 MILLIGRAM(S): 50 TABLET ORAL at 01:00

## 2020-11-10 RX ADMIN — SODIUM CHLORIDE 100 MILLILITER(S): 9 INJECTION INTRAMUSCULAR; INTRAVENOUS; SUBCUTANEOUS at 02:37

## 2020-11-10 RX ADMIN — HYDROMORPHONE HYDROCHLORIDE 0.3 MG/HR: 2 INJECTION INTRAMUSCULAR; INTRAVENOUS; SUBCUTANEOUS at 19:06

## 2020-11-10 RX ADMIN — PANTOPRAZOLE SODIUM 40 MILLIGRAM(S): 20 TABLET, DELAYED RELEASE ORAL at 07:51

## 2020-11-10 RX ADMIN — SODIUM CHLORIDE 150 MILLILITER(S): 9 INJECTION INTRAMUSCULAR; INTRAVENOUS; SUBCUTANEOUS at 07:51

## 2020-11-10 RX ADMIN — HYDROMORPHONE HYDROCHLORIDE 1 MILLIGRAM(S): 2 INJECTION INTRAMUSCULAR; INTRAVENOUS; SUBCUTANEOUS at 15:06

## 2020-11-10 RX ADMIN — ONDANSETRON 4 MILLIGRAM(S): 8 TABLET, FILM COATED ORAL at 07:50

## 2020-11-10 RX ADMIN — HYDROMORPHONE HYDROCHLORIDE 1 MILLIGRAM(S): 2 INJECTION INTRAMUSCULAR; INTRAVENOUS; SUBCUTANEOUS at 11:22

## 2020-11-10 RX ADMIN — HYDROMORPHONE HYDROCHLORIDE 1 MILLIGRAM(S): 2 INJECTION INTRAMUSCULAR; INTRAVENOUS; SUBCUTANEOUS at 06:44

## 2020-11-10 RX ADMIN — HYDROMORPHONE HYDROCHLORIDE 0.5 MILLIGRAM(S): 2 INJECTION INTRAMUSCULAR; INTRAVENOUS; SUBCUTANEOUS at 01:39

## 2020-11-10 RX ADMIN — Medication 3 MILLIGRAM(S): at 01:58

## 2020-11-10 RX ADMIN — HYDROMORPHONE HYDROCHLORIDE 0.5 MILLIGRAM(S): 2 INJECTION INTRAMUSCULAR; INTRAVENOUS; SUBCUTANEOUS at 13:43

## 2020-11-10 RX ADMIN — HYDROMORPHONE HYDROCHLORIDE 0.5 MILLIGRAM(S): 2 INJECTION INTRAMUSCULAR; INTRAVENOUS; SUBCUTANEOUS at 02:56

## 2020-11-10 RX ADMIN — HYDROMORPHONE HYDROCHLORIDE 1 MILLIGRAM(S): 2 INJECTION INTRAMUSCULAR; INTRAVENOUS; SUBCUTANEOUS at 16:56

## 2020-11-10 RX ADMIN — Medication 30 MILLIGRAM(S): at 07:51

## 2020-11-10 RX ADMIN — HYDROMORPHONE HYDROCHLORIDE 1 MILLIGRAM(S): 2 INJECTION INTRAMUSCULAR; INTRAVENOUS; SUBCUTANEOUS at 11:01

## 2020-11-10 RX ADMIN — Medication 30 MILLIGRAM(S): at 09:37

## 2020-11-10 RX ADMIN — ONDANSETRON 4 MILLIGRAM(S): 8 TABLET, FILM COATED ORAL at 01:39

## 2020-11-10 RX ADMIN — Medication 5 MILLIGRAM(S): at 12:07

## 2020-11-10 RX ADMIN — Medication 10 MILLIGRAM(S): at 07:50

## 2020-11-10 RX ADMIN — HYDROMORPHONE HYDROCHLORIDE 1 MILLIGRAM(S): 2 INJECTION INTRAMUSCULAR; INTRAVENOUS; SUBCUTANEOUS at 07:37

## 2020-11-10 RX ADMIN — HYDROMORPHONE HYDROCHLORIDE 0.5 MILLIGRAM(S): 2 INJECTION INTRAMUSCULAR; INTRAVENOUS; SUBCUTANEOUS at 14:55

## 2020-11-10 RX ADMIN — SENNA PLUS 2 TABLET(S): 8.6 TABLET ORAL at 00:02

## 2020-11-10 RX ADMIN — TRAMADOL HYDROCHLORIDE 25 MILLIGRAM(S): 50 TABLET ORAL at 00:02

## 2020-11-10 RX ADMIN — Medication 5 MILLIGRAM(S): at 21:26

## 2020-11-10 RX ADMIN — HYDROMORPHONE HYDROCHLORIDE 0.5 MILLIGRAM(S): 2 INJECTION INTRAMUSCULAR; INTRAVENOUS; SUBCUTANEOUS at 03:46

## 2020-11-10 RX ADMIN — POLYETHYLENE GLYCOL 3350 17 GRAM(S): 17 POWDER, FOR SOLUTION ORAL at 13:43

## 2020-11-10 RX ADMIN — SENNA PLUS 2 TABLET(S): 8.6 TABLET ORAL at 21:27

## 2020-11-10 NOTE — PROGRESS NOTE ADULT - SUBJECTIVE AND OBJECTIVE BOX
INTERVAL HPI/OVERNIGHT EVENTS:.  pt seen and examined  interim events noted  sp ercp yesterday  states post procedure felt well, then had dinner and thereafter w severe abd pain, nausea and non bloody vomiting  no bm but passing flatus    MEDICATIONS  (STANDING):  metoclopramide Injectable 5 milliGRAM(s) IV Push every 6 hours  pantoprazole  Injectable 40 milliGRAM(s) IV Push daily  senna 2 Tablet(s) Oral at bedtime  sodium chloride 0.9%. 1000 milliLiter(s) (150 mL/Hr) IV Continuous <Continuous>    MEDICATIONS  (PRN):  HYDROmorphone  Injectable 0.5 milliGRAM(s) IV Push every 6 hours PRN Moderate Pain (4 - 6)  HYDROmorphone  Injectable 1 milliGRAM(s) IV Push every 4 hours PRN Severe Pain (7 - 10)  ondansetron Injectable 4 milliGRAM(s) IV Push every 4 hours PRN Nausea and/or Vomiting      Allergies    cephalosporins (Rash (Moderate))  Cipro (Rash (Moderate))    Intolerances        Review of Systems:    General:  No wt loss, fevers, chills, night sweats, fatigue   Eyes:  Good vision, no reported pain  ENT:  No sore throat, pain, runny nose, dysphagia  CV:  No pain, palpitations, hypo/hypertension  Resp:  No dyspnea, cough, tachypnea, wheezing  GI: see above  :  No pain, bleeding, incontinence, nocturia  Muscle:  No pain, weakness  Neuro:  No weakness, tingling, memory problems  Psych:  No fatigue, insomnia, mood problems, depression  Endocrine:  No polyuria, polydypsia, cold/heat intolerance  Heme:  No petechiae, ecchymosis, easy bruisability  Skin:  No rash, tattoos, scars, edema      Vital Signs Last 24 Hrs  T(C): 37 (10 Nov 2020 04:35), Max: 37.2 (09 Nov 2020 12:37)  T(F): 98.6 (10 Nov 2020 04:35), Max: 98.9 (09 Nov 2020 12:37)  HR: 87 (10 Nov 2020 04:35) (71 - 101)  BP: 105/69 (10 Nov 2020 04:35) (91/53 - 131/65)  BP(mean): --  RR: 19 (10 Nov 2020 04:35) (14 - 20)  SpO2: 96% (10 Nov 2020 04:35) (94% - 100%)    PHYSICAL EXAM:    Constitutional: lying in bed uncomfortable appearing  HEENT: ncat  Gastrointestinal: soft nd ttp epigastrium  Extremities: No peripheral edema  Vascular: + peripheral pulses  Neurological: A/O x 3      LABS:                        13.3   16.35 )-----------( 359      ( 10 Nov 2020 08:47 )             39.1     11-10    140  |  106  |  14  ----------------------------<  135<H>  3.7   |  28  |  0.77    Ca    8.4<L>      10 Nov 2020 08:47    TPro  7.0  /  Alb  3.1<L>  /  TBili  0.9  /  DBili  x   /  AST  99<H>  /  ALT  325<H>  /  AlkPhos  469<H>  11-10          RADIOLOGY & ADDITIONAL TESTS:  < from: CT Abdomen and Pelvis No Cont (11.10.20 @ 01:36) >    EXAM:  CT ABDOMEN AND PELVIS                            PROCEDURE DATE:  11/10/2020          INTERPRETATION:  CLINICAL INFORMATION: Abdominal pain. Status post recent laparoscopic cholecystectomy, as well as ERCP with stent placement.    COMPARISON: None.    PROCEDURE:  CT of the Abdomen and Pelvis was performed without intravenous contrast.  Intravenous contrast: None.  Oral contrast: None.  Sagittal and coronal reformats were performed.    FINDINGS:    LOWER CHEST: Atelectatic changes at the left lung base.    The evaluation of the solid organ parenchyma is limited without intravenous contrast.    LIVER: Within normal limits.  BILE DUCTS: Stent within the common bile duct. Minimal central pneumobilia.  GALLBLADDER: Status post cholecystectomy.  SPLEEN: Within normal limits.  PANCREAS: There is an edematous appearance of the pancreas with peripancreatic stranding extending along the retroperitoneal fascial planes. No localized peripancreatic fluid collection is noted.  No pancreatic ductal dilatation is noted.  Evaluation is limited without intravenous contrast.  ADRENALS: Within normal limits.  KIDNEYS/URETERS: Within normal limits.    BLADDER: Within normal limits.  REPRODUCTIVE ORGANS: No pelvic mass.  Small amount of free fluidwithin the posterior pelvis.    BOWEL: Moderate fecal retention within the colon; no bowel obstruction.   Appendix normal.  PERITONEUM: Small amount of free intraperitoneal air.  Small amount of free fluid posterior pelvis.  No localized intra-abdominal fluid collection.    VESSELS: Within normal limits.  RETROPERITONEUM/LYMPH NODES: No lymphadenopathy.  ABDOMINAL WALL: Fat-containing umbilical hernia. Small droplets of air in the periumbilical abdominal wall.    BONES: Within normal limits.    IMPRESSION:    Status post cholecystectomy.    Findings compatible with acute interstitial pancreatitis.  The evaluation for pancreatic necrosis is limited without intravenous contrast.    Free intraperitoneal air, likely related to recent postoperative status.    Small amount of free fluid.    This study was preliminary reported by Dr. Luther on 11/10/2020.              NATALIA VEGA M.D., ATTENDING RADIOLOGIST  This document has been electronically signed. Nov 10 2020  8:34AM    < end of copied text >

## 2020-11-10 NOTE — PROVIDER CONTACT NOTE (OTHER) - BACKGROUND
Pt admitted for c/o abdominal pain/ S/P lap eliza outpatient. US gallbladder showed 5 mm calculus common bile duct

## 2020-11-10 NOTE — PROGRESS NOTE ADULT - PROBLEM SELECTOR PLAN 4
dvt ppx SCDs, encourage ambulation   will consider need for GI ppx if NSAID use increases    IMPROVE VTE Individual Risk Assessment        RISK                                                          Points  [  ] Previous VTE                                                3  [  ] Thrombophilia                                             2  [  ] Lower limb paralysis                                   2        (unable to hold up >15 seconds)    [  ] Current Cancer                                            2         (within 6 months)  [  ] Immobilization > 24 hrs                              1  [  ] ICU/CCU stay > 24 hours                            1  [  ] Age > 60                                                    1  IMPROVE VTE Score __0_______ -in setting of retained CBD stone, with improvement in AST/ALT, alk phos remains elevated  -monitor liver function, f/u CMP   -avoid acetaminophen

## 2020-11-10 NOTE — CONSULT NOTE ADULT - SUBJECTIVE AND OBJECTIVE BOX
HPI:  24 YO F with PMHX cholestasis of pregnancy (2 years ago), cholecystitis s/p cholecystectomy (POD#2) presenting to the ER with acute RUQ pain since 1700. Pt states she had RUQ pain associated with uncontrolled vomiting x 1 month, underwent cholecystectomy 2 days ago at [facility]. Pt's RUQ pain did not subside after surgery, was associated with b/l flank pain, pain acutely worsened at 1700 last night, was on and off, sharp. Associated with non bilious non bloody emesis multiple times until pt began dry heaving. Pt took oxy 5 mg at approximately 1800 however vomited it as well. Pt admits to feeling very dehydrated, has not been able to keep down water, admits to associated lightheadedness. Pt states she also feels constipated, last BM 4 days ago, pt is passing gas. Last oral intake was a banana and toast which she vomited. Pt admits to associated chills. Pt denies sick contacts, sore throat, ear pain, muscle aches, CP SOB, cough. Of note, pt states she has a baseline BP ~110/60.     ED vitals afebrile, tachycardic , 86/62 after 1L IVF bolus BP increased to 121/65.   Labs significant for K 3.3, Bili 3.4, Alk P 356, , . US GB shows a 5 mm calculus in the region of the proximal common bile duct/cystic duct remnant which may represent retained stone.  Pt received 1L IVF bolus, Dilaudid .5 mg x 3, zofran 4 mg IV x1.    (08 Nov 2020 03:16)    PERTINENT PM/SXH:   H/O cholecystitis    History of cholestasis during pregnancy    No pertinent past medical history      S/P D&amp;C (status post dilation and curettage)    History of cholecystectomy      FAMILY HISTORY:  No pertinent family history in first degree relatives      ITEMS NOT CHECKED ARE NOT PRESENT    SOCIAL HISTORY:   Significant other/partner[ ]  Children[ ]  Holiness/Spirituality:  Substance hx:  [ ]   Tobacco hx:  [ ]   Alcohol hx: [ ]   Home Opioid hx:  [ ] I-Stop Reference No:  Living Situation: [ ]Home  [ ]Long term care  [ ]Rehab [ ]Other    ADVANCE DIRECTIVES:    DNR  MOLST  [ ]  Living Will  [ ]   DECISION MAKER(s):  [ ] Health Care Proxy(s)  [ ] Surrogate(s)  [ ] Guardian           Name(s): Phone Number(s):    BASELINE (I)ADL(s) (prior to admission):  [place]: [ ]Total  [ ] Moderate [ ]Dependent    Allergies    cephalosporins (Rash (Moderate))  Cipro (Rash (Moderate))    Intolerances    MEDICATIONS  (STANDING):  HYDROmorphone Infusion 0.3 mG/Hr (0.3 mL/Hr) IV Continuous <Continuous>  metoclopramide Injectable 5 milliGRAM(s) IV Push every 6 hours  pantoprazole  Injectable 40 milliGRAM(s) IV Push daily  polyethylene glycol 3350 17 Gram(s) Oral daily  senna 2 Tablet(s) Oral at bedtime  sodium chloride 0.9%. 1000 milliLiter(s) (150 mL/Hr) IV Continuous <Continuous>    MEDICATIONS  (PRN):  ondansetron Injectable 4 milliGRAM(s) IV Push every 4 hours PRN Nausea and/or Vomiting    PRESENT SYMPTOMS: [ ]Unable to obtain due to poor mentation   Source if other than patient:  [ ]Family   [ ]Team     Pain: [ ]yes [ ]no  QOL impact -   Location -                    Aggravating factors -  Quality -  Radiation -  Timing-  Severity (0-10 scale):  Minimal acceptable level (0-10 scale):     CPOT:    https://www.[facility].org/getattachment/[email or web address]/Critical-Care-Pain-Observation-Tool-(CPOT)      PAIN AD Score:     http://geriatrictoolkit.[email or web address]/cog/painad.pdf (press ctrl +  left click to view)    Dyspnea:                           [ ]Mild [ ]Moderate [ ]Severe  Anxiety:                             [ ]Mild [ ]Moderate [ ]Severe  Fatigue:                             [ ]Mild [ ]Moderate [ ]Severe  Nausea:                             [ ]Mild [ ]Moderate [ ]Severe  Loss of appetite:              [ ]Mild [ ]Moderate [ ]Severe  Constipation:                    [ ]Mild [ ]Moderate [ ]Severe    Other Symptoms:  [ ]All other review of systems negative     Palliative Performance Status Version 2:         %    http://npcrc.org/files/news/palliative_performance_scale_ppsv2.pdf  PHYSICAL EXAM:  Vital Signs Last 24 Hrs  T(C): 36.7 (10 Nov 2020 13:51), Max: 37 (09 Nov 2020 18:04)  T(F): 98.1 (10 Nov 2020 13:51), Max: 98.6 (09 Nov 2020 18:04)  HR: 83 (10 Nov 2020 13:51) (71 - 101)  BP: 105/69 (10 Nov 2020 13:51) (101/62 - 122/63)  BP(mean): --  RR: 17 (10 Nov 2020 13:51) (14 - 20)  SpO2: 95% (10 Nov 2020 13:51) (94% - 100%) I&O's Summary    09 Nov 2020 07:01  -  10 Nov 2020 07:00  --------------------------------------------------------  IN: 840 mL / OUT: 0 mL / NET: 840 mL      GENERAL:  [ ]Alert  [ ]Oriented x   [ ]Lethargic  [ ]Cachexia  [ ]Unarousable  [ ]Verbal  [ ]Non-Verbal  Behavioral:   [ ] Anxiety  [ ] Delirium [ ] Agitation [ ] Other  HEENT:  [ ]Normal   [ ]Dry mouth   [ ]ET Tube/Trach  [ ]Oral lesions  PULMONARY:   [ ]Clear [ ]Tachypnea  [ ]Audible excessive secretions   [ ]Rhonchi        [ ]Right [ ]Left [ ]Bilateral  [ ]Crackles        [ ]Right [ ]Left [ ]Bilateral  [ ]Wheezing     [ ]Right [ ]Left [ ]Bilateral  [ ]Diminished breath sounds [ ]right [ ]left [ ]bilateral  CARDIOVASCULAR:    [ ]Regular [ ]Irregular [ ]Tachy  [ ]Phi [ ]Murmur [ ]Other  GASTROINTESTINAL:  [ ]Soft  [ ]Distended   [ ]+BS  [ ]Non tender [ ]Tender  [ ]PEG [ ]OGT/ NGT  Last BM:     GENITOURINARY:  [ ]Normal [ ] Incontinent   [ ]Oliguria/Anuria   [ ]Fraga  MUSCULOSKELETAL:   [ ]Normal   [ ]Weakness  [ ]Bed/Wheelchair bound [ ]Edema  NEUROLOGIC:   [ ]No focal deficits  [ ]Cognitive impairment  [ ]Dysphagia [ ]Dysarthria [ ]Paresis [ ]Other   SKIN:   [ ]Normal    [ ]Rash  [ ]Pressure ulcer(s)       Present on admission [ ]y [ ]n    CRITICAL CARE:  [ ] Shock Present  [ ]Septic [ ]Cardiogenic [ ]Neurologic [ ]Hypovolemic  [ ]  Vasopressors [ ]  Inotropes   [ ]Respiratory failure present [ ]Mechanical ventilation [ ]Non-invasive ventilatory support [ ]High flow    [ ]Acute  [ ]Chronic [ ]Hypoxic  [ ]Hypercarbic [ ]Other  [ ]Other organ failure     LABS:                        13.3   16.35 )-----------( 359      ( 10 Nov 2020 08:47 )             39.1   11-10    140  |  106  |  14  ----------------------------<  135<H>  3.7   |  28  |  0.77    Ca    8.4<L>      10 Nov 2020 08:47    TPro  7.0  /  Alb  3.1<L>  /  TBili  0.9  /  DBili  x   /  AST  99<H>  /  ALT  325<H>  /  AlkPhos  469<H>  11-10        RADIOLOGY & ADDITIONAL STUDIES:    PROTEIN CALORIE MALNUTRITION PRESENT: [ ]mild [ ]moderate [ ]severe [ ]underweight [ ]morbid obesity  https://www.andeal.org/vault/2440/web/files/ONC/Table_Clinical%20Characteristics%20to%20Document%20Malnutrition-White%20JV%20et%20al%227873.pdf    Height (cm): 157.5 (11-09-20 @ 15:01)  Weight (kg): 79.4 (11-09-20 @ 12:39)  BMI (kg/m2): 32 (11-09-20 @ 15:01)    [ ]PPSV2 < or = to 30% [ ]significant weight loss  [ ]poor nutritional intake  [ ]anasarca     Albumin, Serum: 3.1 g/dL (11-10-20 @ 08:47)   [ ]Artificial Nutrition      REFERRALS:   [ ]Chaplaincy  [ ]Hospice  [ ]Child Life  [ ]Social Work  [ ]Case management [ ]Holistic Therapy     Goals of Care Document: HPI:  24 YO F with PMHX cholestasis of pregnancy (2 years ago), cholecystitis s/p cholecystectomy (POD#2) presenting to the ER with acute RUQ pain since 1700. Pt states she had RUQ pain associated with uncontrolled vomiting x 1 month, underwent cholecystectomy 2 days ago at [facility]. Pt's RUQ pain did not subside after surgery, was associated with b/l flank pain, pain acutely worsened at 1700 last night, was on and off, sharp. Associated with non bilious non bloody emesis multiple times until pt began dry heaving. Pt took oxy 5 mg at approximately 1800 however vomited it as well. Pt admits to feeling very dehydrated, has not been able to keep down water, admits to associated lightheadedness. Pt states she also feels constipated, last BM 4 days ago, pt is passing gas. Last oral intake was a banana and toast which she vomited. Pt admits to associated chills. Pt denies sick contacts, sore throat, ear pain, muscle aches, CP SOB, cough. Of note, pt states she has a baseline BP ~110/60.     ED vitals afebrile, tachycardic , 86/62 after 1L IVF bolus BP increased to 121/65.   Labs significant for K 3.3, Bili 3.4, Alk P 356, , . US GB shows a 5 mm calculus in the region of the proximal common bile duct/cystic duct remnant which may represent retained stone.  Pt received 1L IVF bolus, Dilaudid .5 mg x 3, zofran 4 mg IV x1.    (08 Nov 2020 03:16)    PERTINENT PM/SXH:   H/O cholecystitis    History of cholestasis during pregnancy    No pertinent past medical history      S/P D&amp;C (status post dilation and curettage)    History of cholecystectomy      FAMILY HISTORY:  No pertinent family history in first degree relatives      ITEMS NOT CHECKED ARE NOT PRESENT    SOCIAL HISTORY:   Significant other/partner[x ]  Children[ x]  Buddhism/Spirituality:  Substance hx:  [ ]   Tobacco hx:  [ ]   Alcohol hx: [ ]   Home Opioid hx:  [ ] I-Stop Reference No:  Living Situation: [x ]Home  [ ]Long term care  [ ]Rehab [ ]Other    ADVANCE DIRECTIVES:    DNR  MOLST  [ ]  Living Will  [ ]   DECISION MAKER(s):  [ ] Health Care Proxy(s)  [ ] Surrogate(s)  [ ] Guardian           Name(s): Phone Number(s):    BASELINE (I)ADL(s) (prior to admission):  [place]: [ x]Total  [ ] Moderate [ ]Dependent    Allergies    cephalosporins (Rash (Moderate))  Cipro (Rash (Moderate))    Intolerances    MEDICATIONS  (STANDING):  HYDROmorphone Infusion 0.3 mG/Hr (0.3 mL/Hr) IV Continuous <Continuous>  metoclopramide Injectable 5 milliGRAM(s) IV Push every 6 hours  pantoprazole  Injectable 40 milliGRAM(s) IV Push daily  polyethylene glycol 3350 17 Gram(s) Oral daily  senna 2 Tablet(s) Oral at bedtime  sodium chloride 0.9%. 1000 milliLiter(s) (150 mL/Hr) IV Continuous <Continuous>    MEDICATIONS  (PRN):  ondansetron Injectable 4 milliGRAM(s) IV Push every 4 hours PRN Nausea and/or Vomiting    PRESENT SYMPTOMS: [ ]Unable to obtain due to poor mentation   Source if other than patient:  [ ]Family   [ ]Team     Pain: [x]yes [ ]no  QOL impact - yes  Location -  eoigastric                  Aggravating factors - food  Quality - sharp  Radiation - to bilat flanks  Timing- constant  Severity (0-10 scale): 9/10  Minimal acceptable level (0-10 scale): 5/10    CPOT:    https://www.[facility].org/getattachment/[email or web address]/Critical-Care-Pain-Observation-Tool-(CPOT)      PAIN AD Score:     http://geriatrictoolkit.[place].[facility]/cog/painad.pdf (press ctrl +  left click to view)    Dyspnea:                           [ ]Mild [ ]Moderate [ ]Severe  Anxiety:                             [ ]Mild [ ]Moderate [ ]Severe  Fatigue:                             [ ]Mild [ ]Moderate [ ]Severe  Nausea:                             [ ]Mild [ ]Moderate [ ]Severe  Loss of appetite:              [ ]Mild [ ]Moderate [ ]Severe  Constipation:                    [ ]Mild [ ]Moderate [ ]Severe    Other Symptoms:  [x ]All other review of systems negative     Palliative Performance Status Version 2:       80  %    http://npcrc.org/files/news/palliative_performance_scale_ppsv2.pdf  PHYSICAL EXAM:  Vital Signs Last 24 Hrs  T(C): 36.7 (10 Nov 2020 13:51), Max: 37 (09 Nov 2020 18:04)  T(F): 98.1 (10 Nov 2020 13:51), Max: 98.6 (09 Nov 2020 18:04)  HR: 83 (10 Nov 2020 13:51) (71 - 101)  BP: 105/69 (10 Nov 2020 13:51) (101/62 - 122/63)  BP(mean): --  RR: 17 (10 Nov 2020 13:51) (14 - 20)  SpO2: 95% (10 Nov 2020 13:51) (94% - 100%) I&O's Summary    09 Nov 2020 07:01  -  10 Nov 2020 07:00  --------------------------------------------------------  IN: 840 mL / OUT: 0 mL / NET: 840 mL      GENERAL: some mild to mod distress sec to abd pain,  HOB elevated, NAD, obese  HEENT:  anicteric, moist mucous membranes  CHEST/LUNG:  CTA b/l, no rales, wheezes, or rhonchi  HEART:  RRR, S1, S2  ABDOMEN:  BS+, soft, mildly tender to palp RUQ, nondistended  EXTREMITIES: no edema, cyanosis, or calf tenderness  NERVOUS SYSTEM: answers questions and follows commands appropriately  Psych: flat affect    LABS:                        13.3   16.35 )-----------( 359      ( 10 Nov 2020 08:47 )             39.1   11-10    140  |  106  |  14  ----------------------------<  135<H>  3.7   |  28  |  0.77    Ca    8.4<L>      10 Nov 2020 08:47    TPro  7.0  /  Alb  3.1<L>  /  TBili  0.9  /  DBili  x   /  AST  99<H>  /  ALT  325<H>  /  AlkPhos  469<H>  11-10        RADIOLOGY & ADDITIONAL STUDIES: reviewed    PROTEIN CALORIE MALNUTRITION PRESENT: [ ]mild [ ]moderate [ ]severe [ ]underweight [ ]morbid obesity  https://www.andeal.org/vault/2440/web/files/ONC/Table_Clinical%20Characteristics%20to%20Document%20Malnutrition-White%20JV%20et%20al%202012.pdf    Height (cm): 157.5 (11-09-20 @ 15:01)  Weight (kg): 79.4 (11-09-20 @ 12:39)  BMI (kg/m2): 32 (11-09-20 @ 15:01)    [ ]PPSV2 < or = to 30% [ ]significant weight loss  [ ]poor nutritional intake  [ ]anasarca     Albumin, Serum: 3.1 g/dL (11-10-20 @ 08:47)   [ ]Artificial Nutrition      REFERRALS:   [ ]Chaplaincy  [ ]Hospice  [ ]Child Life  [ ]Social Work  [ ]Case management [ ]Holistic Therapy     Goals of Care Document:

## 2020-11-10 NOTE — PROGRESS NOTE ADULT - PROBLEM SELECTOR PLAN 1
sp ercp- w es, stone removal and cbd stent placement  also w +duodenal ulcers; c/b post procedure pancreatitis; imaging reviewed  npo, aggressive ivf  ppi ppx, reglan atc  gentle bowel regimen  pain control; trend cbc, lfts, lipase  monitor abdominal exam/gi function  will need eventual stent removal as op  to follow

## 2020-11-10 NOTE — PROGRESS NOTE ADULT - SUBJECTIVE AND OBJECTIVE BOX
s/p ERCP w/CBD stent    SUBJECTIVE:  Patient seen and examined at bedside. Called by medicine for evaluation for patient with 10/10 abdominal pain. Pt states she's overwhelmed with pain, spitting up with n/v.     Vital Signs Last 24 Hrs  T(C): 37 (10 Nov 2020 04:35), Max: 37.2 (09 Nov 2020 12:37)  T(F): 98.6 (10 Nov 2020 04:35), Max: 98.9 (09 Nov 2020 12:37)  HR: 87 (10 Nov 2020 04:35) (71 - 101)  BP: 105/69 (10 Nov 2020 04:35) (91/53 - 131/65)  BP(mean): --  RR: 19 (10 Nov 2020 04:35) (14 - 20)  SpO2: 96% (10 Nov 2020 04:35) (94% - 100%)    PHYSICAL EXAM:  GENERAL: No acute distress, well-developed  HEAD:  Atraumatic, Normocephalic  ABDOMEN: Soft, moderate, diffuse-tenderness greatest in upper abdomen, non-distended; bowel sounds+  NEUROLOGY: A&O x 3, no focal deficits    I&O's Summary    09 Nov 2020 07:01  -  10 Nov 2020 07:00  --------------------------------------------------------  IN: 840 mL / OUT: 0 mL / NET: 840 mL      I&O's Detail    09 Nov 2020 07:01  -  10 Nov 2020 07:00  --------------------------------------------------------  IN:    Lactated Ringers: 600 mL    Oral Fluid: 240 mL  Total IN: 840 mL    OUT:  Total OUT: 0 mL    Total NET: 840 mL        MEDICATIONS  (STANDING):  metoclopramide Injectable 5 milliGRAM(s) IV Push every 6 hours  pantoprazole  Injectable 40 milliGRAM(s) IV Push daily  senna 2 Tablet(s) Oral at bedtime  sodium chloride 0.9%. 1000 milliLiter(s) (150 mL/Hr) IV Continuous <Continuous>    MEDICATIONS  (PRN):  HYDROmorphone  Injectable 0.5 milliGRAM(s) IV Push every 6 hours PRN Moderate Pain (4 - 6)  HYDROmorphone  Injectable 1 milliGRAM(s) IV Push every 4 hours PRN Severe Pain (7 - 10)  ondansetron Injectable 4 milliGRAM(s) IV Push every 4 hours PRN Nausea and/or Vomiting    LABS:                        13.3   16.35 )-----------( 359      ( 10 Nov 2020 08:47 )             39.1     11-10    140  |  106  |  14  ----------------------------<  135<H>  3.7   |  28  |  0.77    Ca    8.4<L>      10 Nov 2020 08:47    TPro  7.0  /  Alb  3.1<L>  /  TBili  0.9  /  DBili  x   /  AST  99<H>  /  ALT  325<H>  /  AlkPhos  469<H>  11-10    RADIOLOGY & ADDITIONAL STUDIES:  no new    ASSESSMENT    25y Female s/p ERCP currently with post-ERCP pancreatitis with poorly controlled pain.    PLAN  - abdomen benign at this time  - pain schedule reviewed, adjusted to increased Dilaudid frequency  - Protonix switched to IV  - leukocytosis likely reactive  - lipase improving  - f/u AM labs  - fluids increased to 150cc's/hr  - serial exams  - AM labs  - no indication for surgical intervention at this time      Surgical Team Contact Information  Spectralink: Ext: [phone] or [phone]  Pager: [phone]

## 2020-11-10 NOTE — PROGRESS NOTE ADULT - PROBLEM SELECTOR PLAN 2
-resolved  -K 3.3 in ER likely 2/2 vomiting now 3.7 (11/9)  -f/u CMP, replete as needed -s/p cholecystectomy POD#4  -s/p ERCP yesterday, stone removal, CBD stent placement  -zofran 4 mg q6 PRN for nausea and or vomiting   -Dilaudid .5 mg q 6 hours PRN for moderate pain, Dilaudid 1 mg q 4 hours PRN for severe pain   -surgery Dr. Kenney following  -GI Dr. Delgadillo following -s/p cholecystectomy POD#4  -s/p ERCP yesterday, stone removal, CBD stent placement  -zofran 4 mg q6 PRN for nausea and or vomiting   -Dilaudid as above  -surgery Dr. Kenney following  -GI Dr. Delgadillo following

## 2020-11-10 NOTE — PROGRESS NOTE ADULT - PROBLEM SELECTOR PLAN 3
-in setting of retained CBD stone, with mild improvement in AST/ALT, alk phos remains elevated  -monitor liver function, f/u CMP   -avoid acetaminophen -resolved  -f/u CMP, replete as needed -resolved  -f/u CMP, replete as needed, if remains persistently low, will f/u magnesium levels

## 2020-11-10 NOTE — CONSULT NOTE ADULT - ASSESSMENT
24 YO F with PMHX cholestasis of pregnancy (2 years ago), cholecystitis s/p cholecystectomy (POD#4 at [place]), presented with abd pain and N/V postoperatively, admitted for 5mm retained CBD stone, now presenting with pancreatitis s/p ERCP on 11/9.    1) Intractable abdominal pain 2/2 Pancreatitis & retained CBD stone s/p ERCP  2) Anxiety related to medical condition/hospitalization and pain  - lipase downtrending  - continue NPO status with ice chips  - GO & surgery f/u  - start dilaudid gtt at 0.3mg/hr and dilaudid 0.5 mg IVP q4h prn  - continue PPI  - anticipatory guidance and education regarding pain provided    2) Constipation prophylaxis 2/2 opioid use  - senna 2 tabs PO qHS  - miralax prn    Appreciate consult. DIscussed with the primary team.    Will continue to follow.    Giuliana Ingram MD

## 2020-11-10 NOTE — PROGRESS NOTE ADULT - PROBLEM SELECTOR PLAN 1
-abdominal pain 2/2 5mm retained CBD stone POD#3 s/p cholecystectomy, with initially elevated total bilirubin now resolving  -US gallbladder 5 mm calculus in the region of the proximal common bile duct/cystic duct remnant which may represent retained stone.  -zofran 4 mg q6 PRN for nausea and or vomiting   -NPO for likely ERCP today  -ketorolac 15 mg IVP q 6 for mild pain, Dilaudid .5 mg q 6 hours PRN for moderate pain, Dilaudid 1 mg PRN for severe pain   -surgery Dr. Kenney consulted, recommending no immediate surgical intervention at this time  -GI Dr. Delgadillo consulted, plan for ERCP today 11/9 -unresolved pain from pancreatic inflammation s/p ERCP  -lipase initially >83622, downtrending  -CT abdomen showed findings compatible with acute interstitial pancreatitis  -lactate 0.7  -remain NPO  -Dilaudid 0.5 mg q 6 hours PRN for moderate pain, Dilaudid 1 mg q 4 hours PRN for severe pain   -protonix 40 mg q daily switched to IV  -150 ml/hr NS IVF  -f/u CBC, CMP, lipase in am  -surgery, Dr. Kenney following, recommends no surgical intervention at this time  -GI, Dr. Delgadillo following, recommendations as above -unresolved pain from pancreatic inflammation s/p ERCP  -lipase initially >96685, downtrending  -CT abdomen showed findings compatible with acute interstitial pancreatitis  -lactate 0.7  -leukocytosis likely reactive  -remain NPO  -Dilaudid 0.5 mg q 6 hours PRN for moderate pain, Dilaudid 1 mg q 4 hours PRN for severe pain   -protonix 40 mg q daily switched to IV  -150 ml/hr NS IVF  -f/u CBC, CMP, lipase in am  -surgery, Dr. Kenney following, recommends no surgical intervention at this time  -GI, Dr. Delgadillo following, recommendations as above, will need eventual stent removal as outpatient -unresolved pain from pancreatic inflammation s/p ERCP  -lipase initially >13471, downtrending  -CT abdomen showed findings compatible with acute interstitial pancreatitis  -lactate 0.7  -leukocytosis likely reactive  -remain NPO with aggressive IV hydration  -Dilaudid 0.5 mg q 6 hours PRN for moderate pain, Dilaudid 1 mg q 4 hours PRN for severe pain   -protonix 40 mg q daily switched to IV  -150 ml/hr NS IVF  -f/u CBC, CMP, lipase in am  -surgery, Dr. Kenney following, recommends no surgical intervention at this time  -GI, Dr. Delgadillo following, recommendations as above, will need eventual stent removal as outpatient -unresolved pain from pancreatic inflammation s/p ERCP  -lipase initially >42085, downtrending to 14k  -CT abdomen showed findings compatible with acute interstitial pancreatitis  -lactate 0.7  -leukocytosis likely reactive  -remain NPO with aggressive IV hydration  -dilaudid gtt at 0.3mg/hr as per palliative recs collaboration for complexity of care and pain management may go up to 0.5mg/hr if pain is severe or refractory but no higher tonight and no breakthrough pain at this time, to reasses in AM by palliative team  -bowel regimen  -protonix 40 mg q daily switched to IV  -150 ml/hr NS IVF  -f/u CBC, CMP, lipase in am  -surgery, Dr. Kenney following, recommends no surgical intervention at this time  -GI, Dr. Delgadillo following, recommendations as above, will need eventual stent removal as outpatient

## 2020-11-10 NOTE — CHART NOTE - NSCHARTNOTEFT_GEN_A_CORE
Called by RN for increasing abdominal pain. Patient with pancreatitis s/p ERCP with unresolved pain s/p Dilaudid .5mg IVP at 1:39am, 3:45 am. Patient seen and examined at bedside. Patient crying, dry heaving, stating "something is not right." Prelim CT abd/p shows peripancreatic inflammation and some air anterior abd wall most likely from cholecystectomy 2 days ago.    PE:  Gen: patient crying, dry heaving in bed  abd: mildly tender to palpation, no rebound guarding or rigidity, +Bowel sound    Assessment and Plan:  Patient with unresolved pain from pancreatic inflammation s/p ERCP.  Patient given 0.5mg dilaudid x2 without resolution of symptoms.  -STAT lactate ordered  -Surgical PA called, discussed case with patient, states the free air is unlikely due to bowel perforation as patient just had a cholecystectomy 2 days ago, recommends appropriate pain management  -Will consider increasing Dilaudid dose Called by RN for increasing abdominal pain. Patient with pancreatitis s/p ERCP with unresolved pain s/p Dilaudid .5mg IVP at 1:39am, 3:45 am. Patient seen and examined at bedside. Patient crying, dry heaving, stating "something is not right." Prelim CT abd/p shows peripancreatic inflammation and some air anterior abd wall most likely from cholecystectomy 2 days ago.    PE:  Gen: patient crying, dry heaving in bed  abd: mildly tender to palpation, no rebound guarding or rigidity, +Bowel sounds    Assessment and Plan:  Patient with unresolved pain from pancreatic inflammation s/p ERCP, lipase >98508.  Patient given 0.5mg dilaudid x2 without improvement of symptoms.  -Surgical PA called, discussed patient/case, states the free air is unlikely due to bowel perforation as patient just had a cholecystectomy 2 days ago, recommends appropriate pain management  -Will f/u official CT abd/p read  -STAT lactate ordered  -Continue 100ml/hr NS IVF  -Remain NPO  -Will give 1x dose 1mg Dilaudid IVP now and sign out to day team for appropriate pain management regimen Called by RN for increasing abdominal pain. Patient with pancreatitis s/p ERCP with unresolved pain s/p Dilaudid .5mg IVP at 1:39am, 3:45 am. Patient seen and examined at bedside. Patient crying, dry heaving, stating "something is not right." Prelim CT abd/p shows peripancreatic inflammation and some air anterior abd wall most likely from cholecystectomy 2 days ago.    PE:  Gen: patient crying, dry heaving in bed  abd: mildly tender to palpation, no rebound guarding or rigidity, +Bowel sounds    Assessment and Plan:  Patient with unresolved pain from pancreatic inflammation s/p ERCP, lipase >[id number].  Patient given 0.5mg dilaudid x2 without improvement of symptoms.  -Surgical PA called, discussed patient/case, states the free air is unlikely due to bowel perforation as patient just had a cholecystectomy 2 days ago, recommends appropriate pain management, will see patient  -Will f/u official CT abd/p read  -STAT lactate ordered  -Continue 100ml/hr NS IVF  -Remain NPO  -Will give 1x dose 1mg Dilaudid IVP now and sign out to day team for appropriate pain management regimen

## 2020-11-10 NOTE — PROGRESS NOTE ADULT - SUBJECTIVE AND OBJECTIVE BOX
Patient is a 25y old  Female who presents with a chief complaint of retained CBD stone s/p cholecystectomy (10 Nov 2020 10:35)      INTERVAL HPI/OVERNIGHT EVENTS: Patient seen and examined at bedside. No overnight events occurred. Patient has no complaints at this time. Denies fevers, chills, headache, lightheadedness, chest pain, dyspnea, abdominal pain, n/v/d/c.    MEDICATIONS  (STANDING):  metoclopramide Injectable 5 milliGRAM(s) IV Push every 6 hours  pantoprazole  Injectable 40 milliGRAM(s) IV Push daily  senna 2 Tablet(s) Oral at bedtime  sodium chloride 0.9%. 1000 milliLiter(s) (150 mL/Hr) IV Continuous <Continuous>    MEDICATIONS  (PRN):  HYDROmorphone  Injectable 0.5 milliGRAM(s) IV Push every 6 hours PRN Moderate Pain (4 - 6)  HYDROmorphone  Injectable 1 milliGRAM(s) IV Push every 4 hours PRN Severe Pain (7 - 10)  ondansetron Injectable 4 milliGRAM(s) IV Push every 4 hours PRN Nausea and/or Vomiting      Allergies    cephalosporins (Rash (Moderate))  Cipro (Rash (Moderate))    Intolerances        REVIEW OF SYSTEMS:  CONSTITUTIONAL: No fever or chills  HEENT:  No headache, no sore throat  RESPIRATORY: No cough, wheezing, or shortness of breath  CARDIOVASCULAR: No chest pain, palpitations  GASTROINTESTINAL: No abd pain, nausea, vomiting, or diarrhea  GENITOURINARY: No dysuria, frequency, or hematuria  NEUROLOGICAL: no focal weakness or dizziness  MUSCULOSKELETAL: no myalgias     Vital Signs Last 24 Hrs  T(C): 37 (10 Nov 2020 04:35), Max: 37.2 (09 Nov 2020 12:37)  T(F): 98.6 (10 Nov 2020 04:35), Max: 98.9 (09 Nov 2020 12:37)  HR: 87 (10 Nov 2020 04:35) (71 - 101)  BP: 105/69 (10 Nov 2020 04:35) (91/53 - 131/65)  BP(mean): --  RR: 19 (10 Nov 2020 04:35) (14 - 20)  SpO2: 96% (10 Nov 2020 04:35) (94% - 100%)    PHYSICAL EXAM:  GENERAL: NAD  HEENT:  anicteric, moist mucous membranes  CHEST/LUNG:  CTA b/l, no rales, wheezes, or rhonchi  HEART:  RRR, S1, S2  ABDOMEN:  BS+, soft, nontender, nondistended  EXTREMITIES: no edema, cyanosis, or calf tenderness  NERVOUS SYSTEM: answers questions and follows commands appropriately    LABS:                        13.3   16.35 )-----------( 359      ( 10 Nov 2020 08:47 )             39.1     CBC Full  -  ( 10 Nov 2020 08:47 )  WBC Count : 16.35 K/uL  Hemoglobin : 13.3 g/dL  Hematocrit : 39.1 %  Platelet Count - Automated : 359 K/uL  Mean Cell Volume : 83.9 fl  Mean Cell Hemoglobin : 28.5 pg  Mean Cell Hemoglobin Concentration : 34.0 gm/dL  Auto Neutrophil # : x  Auto Lymphocyte # : x  Auto Monocyte # : x  Auto Eosinophil # : x  Auto Basophil # : x  Auto Neutrophil % : x  Auto Lymphocyte % : x  Auto Monocyte % : x  Auto Eosinophil % : x  Auto Basophil % : x    10 Nov 2020 08:47    140    |  106    |  14     ----------------------------<  135    3.7     |  28     |  0.77     Ca    8.4        10 Nov 2020 08:47    TPro  7.0    /  Alb  3.1    /  TBili  0.9    /  DBili  x      /  AST  99     /  ALT  325    /  AlkPhos  469    10 Nov 2020 08:47        CAPILLARY BLOOD GLUCOSE              RADIOLOGY & ADDITIONAL TESTS:    Personally reviewed.     Consultant(s) Notes Reviewed:  [x] YES  [ ] NO     Patient is a 25y old  Female who presented with a chief complaint of RUQ pain and vomiting a/w retained CBD stone s/p cholecystectomy (POD #4), is s/p ERCP day 1, now with pancreatitis.    INTERVAL HPI/OVERNIGHT EVENTS: Patient seen and examined at bedside. Overnight patient complained repeatedly of increasing abdominal despite pain medications. Patient complains of abdominal tenderness, but states that he pain is controlled on the current regimen. States that had last episode of nausea/vomiting at 6am and is concerned as she has not had a BM since before admission. Denies fevers, chills, headache, lightheadedness, chest pain, dyspnea, diarhhea.    MEDICATIONS  (STANDING):  metoclopramide Injectable 5 milliGRAM(s) IV Push every 6 hours  pantoprazole  Injectable 40 milliGRAM(s) IV Push daily  senna 2 Tablet(s) Oral at bedtime  sodium chloride 0.9%. 1000 milliLiter(s) (150 mL/Hr) IV Continuous <Continuous>    MEDICATIONS  (PRN):  HYDROmorphone  Injectable 0.5 milliGRAM(s) IV Push every 6 hours PRN Moderate Pain (4 - 6)  HYDROmorphone  Injectable 1 milliGRAM(s) IV Push every 4 hours PRN Severe Pain (7 - 10)  ondansetron Injectable 4 milliGRAM(s) IV Push every 4 hours PRN Nausea and/or Vomiting    Allergies    cephalosporins (Rash (Moderate))  Cipro (Rash (Moderate))    Intolerances      REVIEW OF SYSTEMS:  CONSTITUTIONAL: No fever or chills  HEENT:  No headache, no sore throat  RESPIRATORY: No cough, wheezing, or shortness of breath  CARDIOVASCULAR: No chest pain, palpitations  GASTROINTESTINAL: +abd pain,+nausea/vomiting, +constipation, no diarrhea  GENITOURINARY: No dysuria, frequency, or hematuria  NEUROLOGICAL: no focal weakness or dizziness  MUSCULOSKELETAL: no myalgias     Vital Signs Last 24 Hrs  T(C): 37 (10 Nov 2020 04:35), Max: 37.2 (09 Nov 2020 12:37)  T(F): 98.6 (10 Nov 2020 04:35), Max: 98.9 (09 Nov 2020 12:37)  HR: 87 (10 Nov 2020 04:35) (71 - 101)  BP: 105/69 (10 Nov 2020 04:35) (91/53 - 131/65)  RR: 19 (10 Nov 2020 04:35) (14 - 20)  SpO2: 96% (10 Nov 2020 04:35) (94% - 100%)    PHYSICAL EXAM:  GENERAL: Sleeping, HOB elevated, NAD  HEENT:  anicteric, moist mucous membranes  CHEST/LUNG:  CTA b/l, no rales, wheezes, or rhonchi  HEART:  RRR, S1, S2  ABDOMEN:  BS+, soft, mildly tender to palp, nondistended  EXTREMITIES: no edema, cyanosis, or calf tenderness  NERVOUS SYSTEM: answers questions and follows commands appropriately    LABS:                        13.3   16.35 )-----------( 359      ( 10 Nov 2020 08:47 )             39.1     CBC Full  -  ( 10 Nov 2020 08:47 )  WBC Count : 16.35 K/uL  Hemoglobin : 13.3 g/dL  Hematocrit : 39.1 %  Platelet Count - Automated : 359 K/uL  Mean Cell Volume : 83.9 fl  Mean Cell Hemoglobin : 28.5 pg  Mean Cell Hemoglobin Concentration : 34.0 gm/dL  Auto Neutrophil # : x  Auto Lymphocyte # : x  Auto Monocyte # : x  Auto Eosinophil # : x  Auto Basophil # : x  Auto Neutrophil % : x  Auto Lymphocyte % : x  Auto Monocyte % : x  Auto Eosinophil % : x  Auto Basophil % : x    10 Nov 2020 08:47    140    |  106    |  14     ----------------------------<  135    3.7     |  28     |  0.77     Ca    8.4        10 Nov 2020 08:47    TPro  7.0    /  Alb  3.1    /  TBili  0.9    /  DBili  x      /  AST  99     /  ALT  325    /  AlkPhos  469    10 Nov 2020 08:47      RADIOLOGY & ADDITIONAL TESTS:    CT Abdomen:  FINDINGS:    LOWER CHEST: Atelectatic changes at the left lung base.    The evaluation of the solid organ parenchyma is limited without intravenous contrast.    LIVER: Within normal limits.  BILE DUCTS: Stent within the common bile duct. Minimal central pneumobilia.  GALLBLADDER: Status post cholecystectomy.  SPLEEN: Within normal limits.  PANCREAS: There is an edematous appearance of the pancreas with peripancreatic stranding extending along the retroperitoneal fascial planes. No localized peripancreatic fluid collection is noted.  No pancreatic ductal dilatation is noted.  Evaluation is limited without intravenous contrast.  ADRENALS: Within normal limits.  KIDNEYS/URETERS: Within normal limits.    BLADDER: Within normal limits.  REPRODUCTIVE ORGANS: No pelvic mass.  Small amount of free fluid within the posterior pelvis.    BOWEL: Moderate fecal retention within the colon; no bowel obstruction.   Appendix normal.  PERITONEUM: Small amount of free intraperitoneal air.  Small amount of free fluid posterior pelvis.  No localized intra-abdominal fluid collection.    VESSELS: Within normal limits.  RETROPERITONEUM/LYMPH NODES: No lymphadenopathy.  ABDOMINAL WALL: Fat-containing umbilical hernia. Small droplets of air in the periumbilical abdominal wall.    BONES: Within normal limits.    IMPRESSION:    Status post cholecystectomy.    Findings compatible with acute interstitial pancreatitis.  The evaluation for pancreatic necrosis is limited without intravenous contrast.    Free intraperitoneal air, likely related to recent postoperative status.    Small amount of free fluid.    This study was preliminary reported by Dr. Luther on 11/10/2020.          Personally reviewed.     Consultant(s) Notes Reviewed:  [x] YES  [ ] NO     Patient is a 25y old  Female who presented with a chief complaint of RUQ pain and vomiting a/w retained CBD stone s/p cholecystectomy (POD #4), now presenting with pancreatitis s/p ERCP.    INTERVAL HPI/OVERNIGHT EVENTS: Patient seen and examined at bedside. Overnight patient complained repeatedly of increasing abdominal despite pain medications. Patient complains of abdominal tenderness, but states that he pain is controlled on the current regimen. States that had last episode of nausea/vomiting at 6am and is concerned as she has not had a BM since before admission. Denies fevers, chills, headache, lightheadedness, chest pain, dyspnea, diarhhea.    MEDICATIONS  (STANDING):  metoclopramide Injectable 5 milliGRAM(s) IV Push every 6 hours  pantoprazole  Injectable 40 milliGRAM(s) IV Push daily  senna 2 Tablet(s) Oral at bedtime  sodium chloride 0.9%. 1000 milliLiter(s) (150 mL/Hr) IV Continuous <Continuous>    MEDICATIONS  (PRN):  HYDROmorphone  Injectable 0.5 milliGRAM(s) IV Push every 6 hours PRN Moderate Pain (4 - 6)  HYDROmorphone  Injectable 1 milliGRAM(s) IV Push every 4 hours PRN Severe Pain (7 - 10)  ondansetron Injectable 4 milliGRAM(s) IV Push every 4 hours PRN Nausea and/or Vomiting    Allergies    cephalosporins (Rash (Moderate))  Cipro (Rash (Moderate))    Intolerances      REVIEW OF SYSTEMS:  CONSTITUTIONAL: No fever or chills  HEENT:  No headache, no sore throat  RESPIRATORY: No cough, wheezing, or shortness of breath  CARDIOVASCULAR: No chest pain, palpitations  GASTROINTESTINAL: +abd pain,+nausea/vomiting, +constipation, no diarrhea  GENITOURINARY: No dysuria, frequency, or hematuria  NEUROLOGICAL: no focal weakness or dizziness  MUSCULOSKELETAL: no myalgias     Vital Signs Last 24 Hrs  T(C): 37 (10 Nov 2020 04:35), Max: 37.2 (09 Nov 2020 12:37)  T(F): 98.6 (10 Nov 2020 04:35), Max: 98.9 (09 Nov 2020 12:37)  HR: 87 (10 Nov 2020 04:35) (71 - 101)  BP: 105/69 (10 Nov 2020 04:35) (91/53 - 131/65)  RR: 19 (10 Nov 2020 04:35) (14 - 20)  SpO2: 96% (10 Nov 2020 04:35) (94% - 100%)    PHYSICAL EXAM:  GENERAL: Sleeping, HOB elevated, NAD  HEENT:  anicteric, moist mucous membranes  CHEST/LUNG:  CTA b/l, no rales, wheezes, or rhonchi  HEART:  RRR, S1, S2  ABDOMEN:  BS+, soft, mildly tender to palp, nondistended  EXTREMITIES: no edema, cyanosis, or calf tenderness  NERVOUS SYSTEM: answers questions and follows commands appropriately    LABS:                        13.3   16.35 )-----------( 359      ( 10 Nov 2020 08:47 )             39.1     CBC Full  -  ( 10 Nov 2020 08:47 )  WBC Count : 16.35 K/uL  Hemoglobin : 13.3 g/dL  Hematocrit : 39.1 %  Platelet Count - Automated : 359 K/uL  Mean Cell Volume : 83.9 fl  Mean Cell Hemoglobin : 28.5 pg  Mean Cell Hemoglobin Concentration : 34.0 gm/dL  Auto Neutrophil # : x  Auto Lymphocyte # : x  Auto Monocyte # : x  Auto Eosinophil # : x  Auto Basophil # : x  Auto Neutrophil % : x  Auto Lymphocyte % : x  Auto Monocyte % : x  Auto Eosinophil % : x  Auto Basophil % : x    10 Nov 2020 08:47    140    |  106    |  14     ----------------------------<  135    3.7     |  28     |  0.77     Ca    8.4        10 Nov 2020 08:47    TPro  7.0    /  Alb  3.1    /  TBili  0.9    /  DBili  x      /  AST  99     /  ALT  325    /  AlkPhos  469    10 Nov 2020 08:47      RADIOLOGY & ADDITIONAL TESTS:    CT Abdomen:  FINDINGS:    LOWER CHEST: Atelectatic changes at the left lung base.    The evaluation of the solid organ parenchyma is limited without intravenous contrast.    LIVER: Within normal limits.  BILE DUCTS: Stent within the common bile duct. Minimal central pneumobilia.  GALLBLADDER: Status post cholecystectomy.  SPLEEN: Within normal limits.  PANCREAS: There is an edematous appearance of the pancreas with peripancreatic stranding extending along the retroperitoneal fascial planes. No localized peripancreatic fluid collection is noted.  No pancreatic ductal dilatation is noted.  Evaluation is limited without intravenous contrast.  ADRENALS: Within normal limits.  KIDNEYS/URETERS: Within normal limits.    BLADDER: Within normal limits.  REPRODUCTIVE ORGANS: No pelvic mass.  Small amount of free fluid within the posterior pelvis.    BOWEL: Moderate fecal retention within the colon; no bowel obstruction.   Appendix normal.  PERITONEUM: Small amount of free intraperitoneal air.  Small amount of free fluid posterior pelvis.  No localized intra-abdominal fluid collection.    VESSELS: Within normal limits.  RETROPERITONEUM/LYMPH NODES: No lymphadenopathy.  ABDOMINAL WALL: Fat-containing umbilical hernia. Small droplets of air in the periumbilical abdominal wall.    BONES: Within normal limits.    IMPRESSION:    Status post cholecystectomy.    Findings compatible with acute interstitial pancreatitis.  The evaluation for pancreatic necrosis is limited without intravenous contrast.    Free intraperitoneal air, likely related to recent postoperative status.    Small amount of free fluid.    This study was preliminary reported by Dr. Luther on 11/10/2020.          Personally reviewed.     Consultant(s) Notes Reviewed:  [x] YES  [ ] NO     Patient is a 25y old  Female who presented with a chief complaint of RUQ pain and vomiting a/w retained CBD stone s/p cholecystectomy (POD #4), now presenting with pancreatitis s/p ERCP.    INTERVAL HPI/OVERNIGHT EVENTS: Patient seen and examined at bedside. Overnight patient complained repeatedly of increasing abdominal despite pain medications. STAT lipase significantly elevated and STAT CT with evidence of peripancreatic inflammation. Patient complains of abdominal tenderness this AM, but states that he pain is controlled on the current regimen. States that had last episode of nausea/vomiting at 6am and is concerned as she has not had a BM since before admission. Denies fevers, chills, headache, lightheadedness, chest pain, dyspnea, diarrhea    MEDICATIONS  (STANDING):  metoclopramide Injectable 5 milliGRAM(s) IV Push every 6 hours  pantoprazole  Injectable 40 milliGRAM(s) IV Push daily  senna 2 Tablet(s) Oral at bedtime  sodium chloride 0.9%. 1000 milliLiter(s) (150 mL/Hr) IV Continuous <Continuous>    MEDICATIONS  (PRN):  HYDROmorphone  Injectable 0.5 milliGRAM(s) IV Push every 6 hours PRN Moderate Pain (4 - 6)  HYDROmorphone  Injectable 1 milliGRAM(s) IV Push every 4 hours PRN Severe Pain (7 - 10)  ondansetron Injectable 4 milliGRAM(s) IV Push every 4 hours PRN Nausea and/or Vomiting    Allergies    cephalosporins (Rash (Moderate))  Cipro (Rash (Moderate))    Intolerances      REVIEW OF SYSTEMS:  CONSTITUTIONAL: No fever or chills  HEENT:  No headache, no sore throat  RESPIRATORY: No cough, wheezing, or shortness of breath  CARDIOVASCULAR: No chest pain, palpitations  GASTROINTESTINAL: +abd pain,+nausea/vomiting, +constipation, no diarrhea  GENITOURINARY: No dysuria, frequency, or hematuria  NEUROLOGICAL: no focal weakness or dizziness  MUSCULOSKELETAL: no myalgias     Vital Signs Last 24 Hrs  T(C): 37 (10 Nov 2020 04:35), Max: 37.2 (09 Nov 2020 12:37)  T(F): 98.6 (10 Nov 2020 04:35), Max: 98.9 (09 Nov 2020 12:37)  HR: 87 (10 Nov 2020 04:35) (71 - 101)  BP: 105/69 (10 Nov 2020 04:35) (91/53 - 131/65)  RR: 19 (10 Nov 2020 04:35) (14 - 20)  SpO2: 96% (10 Nov 2020 04:35) (94% - 100%)    PHYSICAL EXAM:  GENERAL: Sleeping, HOB elevated, NAD  HEENT:  anicteric, moist mucous membranes  CHEST/LUNG:  CTA b/l, no rales, wheezes, or rhonchi  HEART:  RRR, S1, S2  ABDOMEN:  BS+, soft, mildly tender to palp, nondistended  EXTREMITIES: no edema, cyanosis, or calf tenderness  NERVOUS SYSTEM: answers questions and follows commands appropriately    LABS:                        13.3   16.35 )-----------( 359      ( 10 Nov 2020 08:47 )             39.1     CBC Full  -  ( 10 Nov 2020 08:47 )  WBC Count : 16.35 K/uL  Hemoglobin : 13.3 g/dL  Hematocrit : 39.1 %  Platelet Count - Automated : 359 K/uL  Mean Cell Volume : 83.9 fl  Mean Cell Hemoglobin : 28.5 pg  Mean Cell Hemoglobin Concentration : 34.0 gm/dL  Auto Neutrophil # : x  Auto Lymphocyte # : x  Auto Monocyte # : x  Auto Eosinophil # : x  Auto Basophil # : x  Auto Neutrophil % : x  Auto Lymphocyte % : x  Auto Monocyte % : x  Auto Eosinophil % : x  Auto Basophil % : x    10 Nov 2020 08:47    140    |  106    |  14     ----------------------------<  135    3.7     |  28     |  0.77     Ca    8.4        10 Nov 2020 08:47    TPro  7.0    /  Alb  3.1    /  TBili  0.9    /  DBili  x      /  AST  99     /  ALT  325    /  AlkPhos  469    10 Nov 2020 08:47      RADIOLOGY & ADDITIONAL TESTS:    CT Abdomen:  FINDINGS:    LOWER CHEST: Atelectatic changes at the left lung base.    The evaluation of the solid organ parenchyma is limited without intravenous contrast.    LIVER: Within normal limits.  BILE DUCTS: Stent within the common bile duct. Minimal central pneumobilia.  GALLBLADDER: Status post cholecystectomy.  SPLEEN: Within normal limits.  PANCREAS: There is an edematous appearance of the pancreas with peripancreatic stranding extending along the retroperitoneal fascial planes. No localized peripancreatic fluid collection is noted.  No pancreatic ductal dilatation is noted.  Evaluation is limited without intravenous contrast.  ADRENALS: Within normal limits.  KIDNEYS/URETERS: Within normal limits.    BLADDER: Within normal limits.  REPRODUCTIVE ORGANS: No pelvic mass.  Small amount of free fluid within the posterior pelvis.    BOWEL: Moderate fecal retention within the colon; no bowel obstruction.   Appendix normal.  PERITONEUM: Small amount of free intraperitoneal air.  Small amount of free fluid posterior pelvis.  No localized intra-abdominal fluid collection.    VESSELS: Within normal limits.  RETROPERITONEUM/LYMPH NODES: No lymphadenopathy.  ABDOMINAL WALL: Fat-containing umbilical hernia. Small droplets of air in the periumbilical abdominal wall.    BONES: Within normal limits.    IMPRESSION:    Status post cholecystectomy.    Findings compatible with acute interstitial pancreatitis.  The evaluation for pancreatic necrosis is limited without intravenous contrast.    Free intraperitoneal air, likely related to recent postoperative status.    Small amount of free fluid.    This study was preliminary reported by Dr. Luther on 11/10/2020.          Personally reviewed.     Consultant(s) Notes Reviewed:  [x] YES  [ ] NO     Patient is a 25y old  Female who presented with a chief complaint of RUQ pain and vomiting a/w retained CBD stone s/p cholecystectomy (POD #4), now presenting with pancreatitis s/p ERCP.    INTERVAL HPI/OVERNIGHT EVENTS: Patient seen and examined at bedside. Overnight patient complained repeatedly of increasing abdominal despite pain medications. STAT lipase significantly elevated and STAT CT with evidence of peripancreatic inflammation. Patient complains of abdominal tenderness this AM, but states that he pain is controlled on the current regimen. States that had last episode of nausea/vomiting at 6am and is concerned as she has not had a BM since before admission. Denies fevers, chills, headache, lightheadedness, chest pain, dyspnea, diarrhea    MEDICATIONS  (STANDING):  metoclopramide Injectable 5 milliGRAM(s) IV Push every 6 hours  pantoprazole  Injectable 40 milliGRAM(s) IV Push daily  senna 2 Tablet(s) Oral at bedtime  sodium chloride 0.9%. 1000 milliLiter(s) (150 mL/Hr) IV Continuous <Continuous>    MEDICATIONS  (PRN):  HYDROmorphone  Injectable 0.5 milliGRAM(s) IV Push every 6 hours PRN Moderate Pain (4 - 6)  HYDROmorphone  Injectable 1 milliGRAM(s) IV Push every 4 hours PRN Severe Pain (7 - 10)  ondansetron Injectable 4 milliGRAM(s) IV Push every 4 hours PRN Nausea and/or Vomiting    Allergies    cephalosporins (Rash (Moderate))  Cipro (Rash (Moderate))    Intolerances      REVIEW OF SYSTEMS:  CONSTITUTIONAL: No fever or chills  HEENT:  No headache, no sore throat  RESPIRATORY: No cough, wheezing, or shortness of breath  CARDIOVASCULAR: No chest pain, palpitations  GASTROINTESTINAL: +abd pain,+nausea/vomiting, +constipation, no diarrhea  GENITOURINARY: No dysuria, frequency, or hematuria  NEUROLOGICAL: no focal weakness or dizziness  MUSCULOSKELETAL: no myalgias     Vital Signs Last 24 Hrs  T(C): 37 (10 Nov 2020 04:35), Max: 37.2 (09 Nov 2020 12:37)  T(F): 98.6 (10 Nov 2020 04:35), Max: 98.9 (09 Nov 2020 12:37)  HR: 87 (10 Nov 2020 04:35) (71 - 101)  BP: 105/69 (10 Nov 2020 04:35) (91/53 - 131/65)  RR: 19 (10 Nov 2020 04:35) (14 - 20)  SpO2: 96% (10 Nov 2020 04:35) (94% - 100%)    PHYSICAL EXAM:  GENERAL: some mild to mod distress sec to abd pain,  HOB elevated, NAD, obese  HEENT:  anicteric, moist mucous membranes  CHEST/LUNG:  CTA b/l, no rales, wheezes, or rhonchi  HEART:  RRR, S1, S2  ABDOMEN:  BS+, soft, mildly tender to palp RUQ, nondistended  EXTREMITIES: no edema, cyanosis, or calf tenderness  NERVOUS SYSTEM: answers questions and follows commands appropriately    LABS:                        13.3   16.35 )-----------( 359      ( 10 Nov 2020 08:47 )             39.1     CBC Full  -  ( 10 Nov 2020 08:47 )  WBC Count : 16.35 K/uL  Hemoglobin : 13.3 g/dL  Hematocrit : 39.1 %  Platelet Count - Automated : 359 K/uL  Mean Cell Volume : 83.9 fl  Mean Cell Hemoglobin : 28.5 pg  Mean Cell Hemoglobin Concentration : 34.0 gm/dL  Auto Neutrophil # : x  Auto Lymphocyte # : x  Auto Monocyte # : x  Auto Eosinophil # : x  Auto Basophil # : x  Auto Neutrophil % : x  Auto Lymphocyte % : x  Auto Monocyte % : x  Auto Eosinophil % : x  Auto Basophil % : x    10 Nov 2020 08:47    140    |  106    |  14     ----------------------------<  135    3.7     |  28     |  0.77     Ca    8.4        10 Nov 2020 08:47    TPro  7.0    /  Alb  3.1    /  TBili  0.9    /  DBili  x      /  AST  99     /  ALT  325    /  AlkPhos  469    10 Nov 2020 08:47      RADIOLOGY & ADDITIONAL TESTS:    CT Abdomen:  FINDINGS:    LOWER CHEST: Atelectatic changes at the left lung base.    The evaluation of the solid organ parenchyma is limited without intravenous contrast.    LIVER: Within normal limits.  BILE DUCTS: Stent within the common bile duct. Minimal central pneumobilia.  GALLBLADDER: Status post cholecystectomy.  SPLEEN: Within normal limits.  PANCREAS: There is an edematous appearance of the pancreas with peripancreatic stranding extending along the retroperitoneal fascial planes. No localized peripancreatic fluid collection is noted.  No pancreatic ductal dilatation is noted.  Evaluation is limited without intravenous contrast.  ADRENALS: Within normal limits.  KIDNEYS/URETERS: Within normal limits.    BLADDER: Within normal limits.  REPRODUCTIVE ORGANS: No pelvic mass.  Small amount of free fluid within the posterior pelvis.    BOWEL: Moderate fecal retention within the colon; no bowel obstruction.   Appendix normal.  PERITONEUM: Small amount of free intraperitoneal air.  Small amount of free fluid posterior pelvis.  No localized intra-abdominal fluid collection.    VESSELS: Within normal limits.  RETROPERITONEUM/LYMPH NODES: No lymphadenopathy.  ABDOMINAL WALL: Fat-containing umbilical hernia. Small droplets of air in the periumbilical abdominal wall.    BONES: Within normal limits.    IMPRESSION:    Status post cholecystectomy.    Findings compatible with acute interstitial pancreatitis.  The evaluation for pancreatic necrosis is limited without intravenous contrast.    Free intraperitoneal air, likely related to recent postoperative status.    Small amount of free fluid.    This study was preliminary reported by Dr. Luther on 11/10/2020.          Personally reviewed.     Consultant(s) Notes Reviewed:  [x] YES  [ ] NO

## 2020-11-11 DIAGNOSIS — D72.829 ELEVATED WHITE BLOOD CELL COUNT, UNSPECIFIED: ICD-10-CM

## 2020-11-11 LAB
ALBUMIN SERPL ELPH-MCNC: 2.3 G/DL — LOW (ref 3.3–5)
ALP SERPL-CCNC: 276 U/L — HIGH (ref 40–120)
ALT FLD-CCNC: 171 U/L — HIGH (ref 12–78)
ANION GAP SERPL CALC-SCNC: 8 MMOL/L — SIGNIFICANT CHANGE UP (ref 5–17)
APPEARANCE UR: ABNORMAL
AST SERPL-CCNC: 32 U/L — SIGNIFICANT CHANGE UP (ref 15–37)
BASOPHILS # BLD AUTO: 0.04 K/UL — SIGNIFICANT CHANGE UP (ref 0–0.2)
BASOPHILS NFR BLD AUTO: 0.3 % — SIGNIFICANT CHANGE UP (ref 0–2)
BILIRUB SERPL-MCNC: 1 MG/DL — SIGNIFICANT CHANGE UP (ref 0.2–1.2)
BILIRUB UR-MCNC: NEGATIVE — SIGNIFICANT CHANGE UP
BUN SERPL-MCNC: 9 MG/DL — SIGNIFICANT CHANGE UP (ref 7–23)
CALCIUM SERPL-MCNC: 8.1 MG/DL — LOW (ref 8.5–10.1)
CHLORIDE SERPL-SCNC: 108 MMOL/L — SIGNIFICANT CHANGE UP (ref 96–108)
CO2 SERPL-SCNC: 23 MMOL/L — SIGNIFICANT CHANGE UP (ref 22–31)
COLOR SPEC: YELLOW — SIGNIFICANT CHANGE UP
CREAT SERPL-MCNC: 0.69 MG/DL — SIGNIFICANT CHANGE UP (ref 0.5–1.3)
DIFF PNL FLD: NEGATIVE — SIGNIFICANT CHANGE UP
EOSINOPHIL # BLD AUTO: 0.09 K/UL — SIGNIFICANT CHANGE UP (ref 0–0.5)
EOSINOPHIL NFR BLD AUTO: 0.6 % — SIGNIFICANT CHANGE UP (ref 0–6)
GLUCOSE SERPL-MCNC: 106 MG/DL — HIGH (ref 70–99)
GLUCOSE UR QL: NEGATIVE — SIGNIFICANT CHANGE UP
HCT VFR BLD CALC: 44.6 % — SIGNIFICANT CHANGE UP (ref 34.5–45)
HGB BLD-MCNC: 14.4 G/DL — SIGNIFICANT CHANGE UP (ref 11.5–15.5)
IMM GRANULOCYTES NFR BLD AUTO: 0.9 % — SIGNIFICANT CHANGE UP (ref 0–1.5)
KETONES UR-MCNC: ABNORMAL
LEUKOCYTE ESTERASE UR-ACNC: NEGATIVE — SIGNIFICANT CHANGE UP
LIDOCAIN IGE QN: 5926 U/L — HIGH (ref 73–393)
LYMPHOCYTES # BLD AUTO: 1.33 K/UL — SIGNIFICANT CHANGE UP (ref 1–3.3)
LYMPHOCYTES # BLD AUTO: 8.8 % — LOW (ref 13–44)
MCHC RBC-ENTMCNC: 28.2 PG — SIGNIFICANT CHANGE UP (ref 27–34)
MCHC RBC-ENTMCNC: 32.3 GM/DL — SIGNIFICANT CHANGE UP (ref 32–36)
MCV RBC AUTO: 87.5 FL — SIGNIFICANT CHANGE UP (ref 80–100)
MONOCYTES # BLD AUTO: 1.05 K/UL — HIGH (ref 0–0.9)
MONOCYTES NFR BLD AUTO: 7 % — SIGNIFICANT CHANGE UP (ref 2–14)
NEUTROPHILS # BLD AUTO: 12.44 K/UL — HIGH (ref 1.8–7.4)
NEUTROPHILS NFR BLD AUTO: 82.4 % — HIGH (ref 43–77)
NITRITE UR-MCNC: NEGATIVE — SIGNIFICANT CHANGE UP
NRBC # BLD: 0 /100 WBCS — SIGNIFICANT CHANGE UP (ref 0–0)
PH UR: 6 — SIGNIFICANT CHANGE UP (ref 5–8)
PLATELET # BLD AUTO: 384 K/UL — SIGNIFICANT CHANGE UP (ref 150–400)
POTASSIUM SERPL-MCNC: 3.8 MMOL/L — SIGNIFICANT CHANGE UP (ref 3.5–5.3)
POTASSIUM SERPL-SCNC: 3.8 MMOL/L — SIGNIFICANT CHANGE UP (ref 3.5–5.3)
PROT SERPL-MCNC: 6 G/DL — SIGNIFICANT CHANGE UP (ref 6–8.3)
PROT UR-MCNC: 15
RBC # BLD: 5.1 M/UL — SIGNIFICANT CHANGE UP (ref 3.8–5.2)
RBC # FLD: 15.1 % — HIGH (ref 10.3–14.5)
SODIUM SERPL-SCNC: 139 MMOL/L — SIGNIFICANT CHANGE UP (ref 135–145)
SP GR SPEC: 1.01 — SIGNIFICANT CHANGE UP (ref 1.01–1.02)
UROBILINOGEN FLD QL: NEGATIVE — SIGNIFICANT CHANGE UP
WBC # BLD: 15.08 K/UL — HIGH (ref 3.8–10.5)
WBC # FLD AUTO: 15.08 K/UL — HIGH (ref 3.8–10.5)

## 2020-11-11 PROCEDURE — 99233 SBSQ HOSP IP/OBS HIGH 50: CPT

## 2020-11-11 PROCEDURE — 93010 ELECTROCARDIOGRAM REPORT: CPT

## 2020-11-11 RX ADMIN — Medication 5 MILLIGRAM(S): at 22:45

## 2020-11-11 RX ADMIN — Medication 5 MILLIGRAM(S): at 07:55

## 2020-11-11 RX ADMIN — Medication 10 MILLIGRAM(S): at 15:47

## 2020-11-11 RX ADMIN — Medication 1 ENEMA: at 22:59

## 2020-11-11 RX ADMIN — Medication 5 MILLIGRAM(S): at 02:46

## 2020-11-11 RX ADMIN — POLYETHYLENE GLYCOL 3350 17 GRAM(S): 17 POWDER, FOR SOLUTION ORAL at 12:31

## 2020-11-11 RX ADMIN — Medication 5 MILLIGRAM(S): at 12:31

## 2020-11-11 RX ADMIN — SENNA PLUS 2 TABLET(S): 8.6 TABLET ORAL at 22:46

## 2020-11-11 RX ADMIN — ONDANSETRON 4 MILLIGRAM(S): 8 TABLET, FILM COATED ORAL at 07:55

## 2020-11-11 RX ADMIN — PANTOPRAZOLE SODIUM 40 MILLIGRAM(S): 20 TABLET, DELAYED RELEASE ORAL at 12:30

## 2020-11-11 NOTE — PROGRESS NOTE ADULT - ASSESSMENT
24 YO F with PMHX cholestasis of pregnancy (2 years ago), cholecystitis s/p cholecystectomy (at [place]), presented with abd pain and N/V postoperatively, admitted for 5mm retained CBD stone, now presenting with pancreatitis s/p ERCP on 11/9.

## 2020-11-11 NOTE — PROGRESS NOTE ADULT - SUBJECTIVE AND OBJECTIVE BOX
INTERVAL HPI/OVERNIGHT EVENTS:  Patient seen and examined at bedside. Patient states her pain is more manageable. Patient on dilaudid drip as per pain management. Patient still complaining of abdominal pain/soreness and feeling "dry". Patient mentioning bladder fullness. Denies any CP, SOB.    ROS: All other review of systems is negative unless indicated above.    MEDICATIONS  (STANDING):  HYDROmorphone Infusion 0.3 mG/Hr (0.3 mL/Hr) IV Continuous <Continuous>  metoclopramide Injectable 5 milliGRAM(s) IV Push every 6 hours  pantoprazole  Injectable 40 milliGRAM(s) IV Push daily  polyethylene glycol 3350 17 Gram(s) Oral daily  senna 2 Tablet(s) Oral at bedtime  sodium chloride 0.9%. 1000 milliLiter(s) (150 mL/Hr) IV Continuous <Continuous>    MEDICATIONS  (PRN):  bisacodyl Suppository 10 milliGRAM(s) Rectal daily PRN Constipation  ondansetron Injectable 4 milliGRAM(s) IV Push every 4 hours PRN Nausea and/or Vomiting      Allergies    cephalosporins (Rash (Moderate))  Cipro (Rash (Moderate))    Intolerances        Vital Signs Last 24 Hrs  T(C): 36.3 (2020 13:30), Max: 37.3 (10 Nov 2020 20:15)  T(F): 97.3 (2020 13:30), Max: 99.1 (10 Nov 2020 20:15)  HR: 113 (2020 13:30) (92 - 115)  BP: 93/62 (2020 13:30) (93/62 - 97/59)  BP(mean): --  RR: 17 (2020 13:30) (17 - 18)  SpO2: 95% (2020 13:30) (94% - 95%)    11-10 @ 07:01  -   @ 07:00  --------------------------------------------------------  IN: 1950 mL / OUT: 0 mL / NET: 1950 mL      Physical Exam:  General: WN/WD NAD  Neurology: A&Ox3, nonfocal, GREEN x 4  Respiratory: CTA B/L  CV: RRR, S1S2, no murmurs, rubs or gallops  Abdominal: Soft, +TTP epigastric region, no rebound, no guarding, ND +BS  Extremities: No edema, + peripheral pulses      LABS:                        14.4   15.08 )-----------( 384      ( 2020 09:18 )             44.6         139  |  108  |  9   ----------------------------<  106<H>  3.8   |  23  |  0.69    Ca    8.1<L>      2020 09:18    TPro  6.0  /  Alb  2.3<L>  /  TBili  1.0  /  DBili  x   /  AST  32  /  ALT  171<H>  /  AlkPhos  276<H>        Urinalysis Basic - ( 2020 08:38 )    Color: Yellow / Appearance: Slightly Turbid / S.015 / pH: x  Gluc: x / Ketone: Small  / Bili: Negative / Urobili: Negative   Blood: x / Protein: 15 / Nitrite: Negative   Leuk Esterase: Negative / RBC: x / WBC 0-2   Sq Epi: x / Non Sq Epi: Few / Bacteria: Few        RADIOLOGY & ADDITIONAL TESTS:

## 2020-11-11 NOTE — PROGRESS NOTE ADULT - PROBLEM SELECTOR PLAN 2
-s/p cholecystectomy POD#5  -s/p ERCP yesterday, stone removal, CBD stent placement  -zofran 4 mg q6 PRN for nausea and or vomiting   -Dilaudid as above  -surgery Dr. Kenney following  -GI Dr. Delgadillo following

## 2020-11-11 NOTE — PROGRESS NOTE ADULT - SUBJECTIVE AND OBJECTIVE BOX
INTERVAL HPI/OVERNIGHT EVENTS:.  pt seen and examined  interim events noted  on dilaudid drip at low dose  constipated     MEDICATIONS  (STANDING):  metoclopramide Injectable 5 milliGRAM(s) IV Push every 6 hours  pantoprazole  Injectable 40 milliGRAM(s) IV Push daily  senna 2 Tablet(s) Oral at bedtime  sodium chloride 0.9%. 1000 milliLiter(s) (150 mL/Hr) IV Continuous <Continuous>    MEDICATIONS  (PRN):  HYDROmorphone  Injectable 0.5 milliGRAM(s) IV Push every 6 hours PRN Moderate Pain (4 - 6)  HYDROmorphone  Injectable 1 milliGRAM(s) IV Push every 4 hours PRN Severe Pain (7 - 10)  ondansetron Injectable 4 milliGRAM(s) IV Push every 4 hours PRN Nausea and/or Vomiting      Allergies    cephalosporins (Rash (Moderate))  Cipro (Rash (Moderate))    Intolerances        Review of Systems:    General:  No wt loss, fevers, chills, night sweats, fatigue   Eyes:  Good vision, no reported pain  ENT:  No sore throat, pain, runny nose, dysphagia  CV:  No pain, palpitations, hypo/hypertension  Resp:  No dyspnea, cough, tachypnea, wheezing  GI: see above  :  No pain, bleeding, incontinence, nocturia  Muscle:  No pain, weakness  Neuro:  No weakness, tingling, memory problems  Psych:  No fatigue, insomnia, mood problems, depression  Endocrine:  No polyuria, polydypsia, cold/heat intolerance  Heme:  No petechiae, ecchymosis, easy bruisability  Skin:  No rash, tattoos, scars, edema      Vital Signs Last 24 Hrs  T(C): 37 (10 Nov 2020 04:35), Max: 37.2 (09 Nov 2020 12:37)  T(F): 98.6 (10 Nov 2020 04:35), Max: 98.9 (09 Nov 2020 12:37)  HR: 87 (10 Nov 2020 04:35) (71 - 101)  BP: 105/69 (10 Nov 2020 04:35) (91/53 - 131/65)  BP(mean): --  RR: 19 (10 Nov 2020 04:35) (14 - 20)  SpO2: 96% (10 Nov 2020 04:35) (94% - 100%)    PHYSICAL EXAM:    Constitutional: lying in bed uncomfortable appearing  HEENT: ncat  Gastrointestinal: soft nd ttp epigastrium  Extremities: No peripheral edema  Vascular: + peripheral pulses  Neurological: A/O x 3      LABS:                        13.3   16.35 )-----------( 359      ( 10 Nov 2020 08:47 )             39.1     11-10    140  |  106  |  14  ----------------------------<  135<H>  3.7   |  28  |  0.77    Ca    8.4<L>      10 Nov 2020 08:47    TPro  7.0  /  Alb  3.1<L>  /  TBili  0.9  /  DBili  x   /  AST  99<H>  /  ALT  325<H>  /  AlkPhos  469<H>  11-10          RADIOLOGY & ADDITIONAL TESTS:  < from: CT Abdomen and Pelvis No Cont (11.10.20 @ 01:36) >    EXAM:  CT ABDOMEN AND PELVIS                            PROCEDURE DATE:  11/10/2020          INTERPRETATION:  CLINICAL INFORMATION: Abdominal pain. Status post recent laparoscopic cholecystectomy, as well as ERCP with stent placement.    COMPARISON: None.    PROCEDURE:  CT of the Abdomen and Pelvis was performed without intravenous contrast.  Intravenous contrast: None.  Oral contrast: None.  Sagittal and coronal reformats were performed.    FINDINGS:    LOWER CHEST: Atelectatic changes at the left lung base.    The evaluation of the solid organ parenchyma is limited without intravenous contrast.    LIVER: Within normal limits.  BILE DUCTS: Stent within the common bile duct. Minimal central pneumobilia.  GALLBLADDER: Status post cholecystectomy.  SPLEEN: Within normal limits.  PANCREAS: There is an edematous appearance of the pancreas with peripancreatic stranding extending along the retroperitoneal fascial planes. No localized peripancreatic fluid collection is noted.  No pancreatic ductal dilatation is noted.  Evaluation is limited without intravenous contrast.  ADRENALS: Within normal limits.  KIDNEYS/URETERS: Within normal limits.    BLADDER: Within normal limits.  REPRODUCTIVE ORGANS: No pelvic mass.  Small amount of free fluidwithin the posterior pelvis.    BOWEL: Moderate fecal retention within the colon; no bowel obstruction.   Appendix normal.  PERITONEUM: Small amount of free intraperitoneal air.  Small amount of free fluid posterior pelvis.  No localized intra-abdominal fluid collection.    VESSELS: Within normal limits.  RETROPERITONEUM/LYMPH NODES: No lymphadenopathy.  ABDOMINAL WALL: Fat-containing umbilical hernia. Small droplets of air in the periumbilical abdominal wall.    BONES: Within normal limits.    IMPRESSION:    Status post cholecystectomy.    Findings compatible with acute interstitial pancreatitis.  The evaluation for pancreatic necrosis is limited without intravenous contrast.    Free intraperitoneal air, likely related to recent postoperative status.    Small amount of free fluid.    This study was preliminary reported by Dr. Luther on 11/10/2020.              NATALIA VEGA M.D., ATTENDING RADIOLOGIST  This document has been electronically signed. Nov 10 2020  8:34AM    < end of copied text >

## 2020-11-11 NOTE — CHART NOTE - NSCHARTNOTEFT_GEN_A_CORE
Called by RN for patient  and patient requesting an enema. Patient seen and examined at bedside. States she has not had a BM in over 6 days. Patient has tried miralax, senna, and suppository without passing BM. Patient thinks that is also contributing to her abdominal pain. Denies any CP, palpitations, SOB. Patient currently on dilaudid gtt .3mL/hr and 150mL/hr NS.   -Will order enema  -Tachycardia likely 2/2 to pain and worsening constipation, will continue to monitor and re-evaluate s/p enema  -Tachycardia unlikley related to dehydration given IVF at 150mL/hr, and unlikely PE as patient is not SOB  -RN to call with any concerns or changes Called by RN for  -Will order enema  -Tachycardia unlikley related to dehydration given IVF at 150mL/hr  -Patient at moderate risk of PE according to Wells criteria given recent surgery and immobilization, however  and unlikely PE as patient is not SOB or having hemoptysis   -Tachycardia likely 2/2 to pain and worsening constipation, will continue to monitor and re-evaluate s/p enema  -RN to call with any concerns or changes        Called by RN for patient  and patient requesting an enema. Patient seen and examined at bedside. States she has not had a BM in over 6 days. Patient has tried miralax, senna, and suppository without passing BM. Patient thinks her constipation is also contributing to her abdominal pain. Denies any CP, palpitations, SOB. Patient currently on dilaudid gtt .3mL/hr and 150mL/hr NS.         T(C): 37.1 (20 @ 21:09), Max: 37.3 (20 @ 04:41)  HR: 130 (20 @ 21:09) (113 - 130)  BP: 111/74 (20 @ 21:09) (93/62 - 111/74)  RR: 17 (20 @ 21:09) (17 - 18)  SpO2: 95% (20 @ 21:09) (95% - 95%)  Wt(kg): --    Physical :  Gen- NAD, ncat  Cardio - s+1,s+2, tachycardic, regular rhythm, no murmur  Lung - cta b/l, no wheeze, no rhonchi, no rales   Abdomen- +BS, tender to palpation epigastric region, no guarding, no rebound  Ext- no edema, 2+ pulses b/l  Neuro- CN grossly intact, strength 5/5 b/l extrem    LABS:                        14.4   15.08 )-----------( 384      ( 2020 09:18 )             44.6         139  |  108  |  9   ----------------------------<  106<H>  3.8   |  23  |  0.69    Ca    8.1<L>      2020 09:18    TPro  6.0  /  Alb  2.3<L>  /  TBili  1.0  /  DBili  x   /  AST  32  /  ALT  171<H>  /  AlkPhos  276<H>  11-11      Urinalysis Basic - ( 2020 08:38 )    Color: Yellow / Appearance: Slightly Turbid / S.015 / pH: x  Gluc: x / Ketone: Small  / Bili: Negative / Urobili: Negative   Blood: x / Protein: 15 / Nitrite: Negative   Leuk Esterase: Negative / RBC: x / WBC 0-2   Sq Epi: x / Non Sq Epi: Few / Bacteria: Few              Assessment/Plan  25yFemale admitted for   1. Called by RN for patient  and constipation. Patient seen and examined at bedside. Appears uncomfortable, stating her epigastric abdominal pain is slightly worse. Patient has not had a BM in over 6 days despite trying miralax, senna, and suppository . Patient thinks her constipation is contributing to her slightly worsening abdominal pain and is requesting an enema. Denies any CP, palpitations, SOB, hemoptysis, LE pain or swelling. Patient currently on dilaudid gtt .3mL/hr and 150mL/hr NS.         T(C): 37.1 (20 @ 21:09), Max: 37.3 (20 @ 04:41)  HR: 130 (20 @ 21:09) (113 - 130)  BP: 111/74 (20 @ 21:09) (93/62 - 111/74)  RR: 17 (20 @ 21:09) (17 - 18)  SpO2: 95% (20 @ 21:09) (95% - 95%)  Wt(kg): --    Physical :  Gen- mildly distressed, appears uncomfortable  Cardio - s+1,s+2, tachycardic, regular rhythm, no murmur  Lung - cta b/l, no wheeze, no rhonchi, no rales   Abdomen- +BS, tender to palpation epigastric region, no guarding, no rebound  Ext- no edema, negative Jovon's sign bilat  Neuro- CN grossly intact, strength 5/5 b/l extrem    LABS:                        14.4   15.08 )-----------( 384      ( 2020 09:18 )             44.6         139  |  108  |  9   ----------------------------<  106<H>  3.8   |  23  |  0.69    Ca    8.1<L>      2020 09:18    TPro  6.0  /  Alb  2.3<L>  /  TBili  1.0  /  DBili  x   /  AST  32  /  ALT  171<H>  /  AlkPhos  276<H>        Urinalysis Basic - ( 2020 08:38 )    Color: Yellow / Appearance: Slightly Turbid / S.015 / pH: x  Gluc: x / Ketone: Small  / Bili: Negative / Urobili: Negative   Blood: x / Protein: 15 / Nitrite: Negative   Leuk Esterase: Negative / RBC: x / WBC 0-2   Sq Epi: x / Non Sq Epi: Few / Bacteria: Few              Assessment/Plan  25yFemale admitted for retained CBD stone s/p cholecystectomy,  hospital course complicated with ERCP pancreatitis. Patient now tachycardic in 130s  -Tachycardia unlikley related to dehydration given IVF at 150mL/hr  -Patient at moderate risk of PE according to Wells criteria given recent surgery and immobilization not on anticoagulation, however PE less likely as patient is not SOB or hemoptysis, denies LE pain or erythema, negative Jovon's sign bilat  -Tachycardia likely 2/2 to pain and worsening constipation  -Will order enema STAT   -Will continue to monitor HR and clinical status  -RN to call with any concerns or changes

## 2020-11-11 NOTE — PROGRESS NOTE ADULT - PROBLEM SELECTOR PLAN 4
-in setting of retained CBD stone, with improvement in AST/ALT, alk phos remains elevated  -monitor liver function, f/u CMP- improving  -avoid acetaminophen

## 2020-11-11 NOTE — PROGRESS NOTE ADULT - SUBJECTIVE AND OBJECTIVE BOX
SUBJECTIVE AND OBJECTIVE:  INTERVAL HPI/OVERNIGHT EVENTS:    DNR on chart:   Allergies    cephalosporins (Rash (Moderate))  Cipro (Rash (Moderate))    Intolerances    MEDICATIONS  (STANDING):  HYDROmorphone Infusion 0.3 mG/Hr (0.3 mL/Hr) IV Continuous <Continuous>  metoclopramide Injectable 5 milliGRAM(s) IV Push every 6 hours  pantoprazole  Injectable 40 milliGRAM(s) IV Push daily  polyethylene glycol 3350 17 Gram(s) Oral daily  senna 2 Tablet(s) Oral at bedtime  sodium chloride 0.9%. 1000 milliLiter(s) (150 mL/Hr) IV Continuous <Continuous>    MEDICATIONS  (PRN):  ondansetron Injectable 4 milliGRAM(s) IV Push every 4 hours PRN Nausea and/or Vomiting      ITEMS UNCHECKED ARE NOT PRESENT    PRESENT SYMPTOMS: [ ]Unable to obtain due to poor mentation   Source if other than patient:  [ ]Family   [ ]Team     Pain:  [ ]yes [ ]no  QOL impact -   Location -                    Aggravating factors -  Quality -  Radiation -  Timing-  Severity (0-10 scale):  Minimal acceptable level (0-10 scale):     Dyspnea:                           [ ]Mild [ ]Moderate [ ]Severe  Anxiety:                             [ ]Mild [ ]Moderate [ ]Severe  Fatigue:                             [ ]Mild [ ]Moderate [ ]Severe  Nausea:                             [ ]Mild [ ]Moderate [ ]Severe  Loss of appetite:              [ ]Mild [ ]Moderate [ ]Severe  Constipation:                    [ ]Mild [ ]Moderate [ ]Severe    CPOT:    https://www.[facility].org/getattachment/[email or web address]/Critical-Care-Pain-Observation-Tool-(CPOT)    PAIN AD Score:	  http://geriatrictoolkit.[place].[facility]/cog/painad.pdf (Ctrl + left click to view)    Other Symptoms:  [ ]All other review of systems negative     Palliative Performance Status Version 2:         %      http://npcrc.org/files/news/palliative_performance_scale_ppsv2.pdf  PHYSICAL EXAM:  Vital Signs Last 24 Hrs  T(C): 37.3 (2020 04:41), Max: 37.3 (10 Nov 2020 20:15)  T(F): 99.1 (2020 04:41), Max: 99.1 (10 Nov 2020 20:15)  HR: 115 (2020 04:41) (83 - 115)  BP: 97/59 (2020 04:41) (93/64 - 105/69)  BP(mean): --  RR: 18 (2020 04:41) (17 - 18)  SpO2: 95% (2020 04:41) (94% - 95%) I&O's Summary    10 Nov 2020 07:01  -  2020 07:00  --------------------------------------------------------  IN: 1950 mL / OUT: 0 mL / NET: 1950 mL       GENERAL:  [ ]Alert  [ ]Oriented x   [ ]Lethargic  [ ]Cachexia  [ ]Unarousable  [ ]Verbal  [ ]Non-Verbal  Behavioral:   [ ]Anxiety  [ ]Delirium [ ]Agitation [ ]Other  HEENT:  [ ]Normal   [ ]Dry mouth   [ ]ET Tube/Trach  [ ]Oral lesions  PULMONARY:   [ ]Clear [ ]Tachypnea  [ ]Audible excessive secretions   [ ]Rhonchi        [ ]Right [ ]Left [ ]Bilateral  [ ]Crackles        [ ]Right [ ]Left [ ]Bilateral  [ ]Wheezing     [ ]Right [ ]Left [ ]Bilateral  [ ]Diminished BS [ ] Right [ ]Left [ ]Bilateral  CARDIOVASCULAR:    [ ]Regular [ ]Irregular [ ]Tachy  [ ]Phi [ ]Murmur [ ]Other  GASTROINTESTINAL:  [ ]Soft  [ ]Distended   [ ]+BS  [ ]Non tender [ ]Tender  [ ]PEG [ ]OGT/ NGT   Last BM:      GENITOURINARY:  [ ]Normal [ ]Incontinent   [ ]Oliguria/Anuria   [ ]Fraga  MUSCULOSKELETAL:   [ ]Normal   [ ]Weakness  [ ]Bed/Wheelchair bound [ ]Edema  NEUROLOGIC:   [ ]No focal deficits  [ ] Cognitive impairment  [ ] Dysphagia [ ]Dysarthria [ ] Paresis [ ]Other   SKIN:   [ ]Normal  [ ]Rash   [ ]Pressure ulcer(s) [ ]y [ ]n present on admission    CRITICAL CARE:  [ ]Shock Present  [ ]Septic [ ]Cardiogenic [ ]Neurologic [ ]Hypovolemic  [ ]Vasopressors [ ]Inotropes  [ ]Respiratory failure present [ ]Mechanical Ventilation [ ]Non-invasive ventilatory support [ ]High-Flow   [ ]Acute  [ ]Chronic [ ]Hypoxic  [ ]Hypercarbic [ ]Other  [ ]Other organ failure     LABS:                        14.4   15.08 )-----------( 384      ( 2020 09:18 )             44.6       139  |  108  |  9   ----------------------------<  106<H>  3.8   |  23  |  0.69    Ca    8.1<L>      2020 09:18    TPro  6.0  /  Alb  2.3<L>  /  TBili  1.0  /  DBili  x   /  AST  32  /  ALT  171<H>  /  AlkPhos  276<H>        Urinalysis Basic - ( 2020 08:38 )    Color: Yellow / Appearance: Slightly Turbid / S.015 / pH: x  Gluc: x / Ketone: Small  / Bili: Negative / Urobili: Negative   Blood: x / Protein: 15 / Nitrite: Negative   Leuk Esterase: Negative / RBC: x / WBC 0-2   Sq Epi: x / Non Sq Epi: Few / Bacteria: Few      RADIOLOGY & ADDITIONAL STUDIES:    Protein Calorie Malnutrition Present: [ ]mild [ ]moderate [ ]severe [ ]underweight [ ]morbid obesity  https://www.andeal.org/vault/2440/web/files/ONC/Table_Clinical%20Characteristics%20to%20Document%20Malnutrition-White%20JV%20et%20al%2020.pdf    Height (cm): 157.5 (20 @ 15:01)  Weight (kg): 79.4 (20 @ 12:39)  BMI (kg/m2): 32 (20 @ 15:01)    [ ]PPSV2 < or = 30%  [ ]significant weight loss [ ]poor nutritional intake [ ]anasarca   Albumin, Serum: 2.3 g/dL (20 @ 09:18)   [ ]Artificial Nutrition    REFERRALS:   [ ]Chaplaincy  [ ]Hospice  [ ]Child Life  [ ]Social Work  [ ]Case management [ ]Holistic Therapy     Goals of Care Document:     SUBJECTIVE AND OBJECTIVE/INTERVAL HPI/OVERNIGHT EVENTS:  - no acute events overnight  - abd pain improving. Denied N/V. Tolerating ice chips. Last BM was 6 days ago    DNR on chart:   Allergies    cephalosporins (Rash (Moderate))  Cipro (Rash (Moderate))    Intolerances    MEDICATIONS  (STANDING):  HYDROmorphone Infusion 0.3 mG/Hr (0.3 mL/Hr) IV Continuous <Continuous>  metoclopramide Injectable 5 milliGRAM(s) IV Push every 6 hours  pantoprazole  Injectable 40 milliGRAM(s) IV Push daily  polyethylene glycol 3350 17 Gram(s) Oral daily  senna 2 Tablet(s) Oral at bedtime  sodium chloride 0.9%. 1000 milliLiter(s) (150 mL/Hr) IV Continuous <Continuous>    MEDICATIONS  (PRN):  ondansetron Injectable 4 milliGRAM(s) IV Push every 4 hours PRN Nausea and/or Vomiting      ITEMS UNCHECKED ARE NOT PRESENT    PRESENT SYMPTOMS: [ ]Unable to obtain due to poor mentation   Source if other than patient:  [ ]Family   [ ]Team     Pain:  [ x]yes [ ]no  QOL impact - yes  Location -  epigastric          Aggravating factors - food  Quality - sharp  Radiation - bilat flanks  Timing- constant  Severity (0-10 scale): 6-7/10  Minimal acceptable level (0-10 scale): 5/10    Dyspnea:                           [ ]Mild [ ]Moderate [ ]Severe  Anxiety:                             [ ]Mild [ ]Moderate [ ]Severe  Fatigue:                             [ ]Mild [ ]Moderate [ ]Severe  Nausea:                             [ ]Mild [ ]Moderate [ ]Severe  Loss of appetite:              [ ]Mild [ ]Moderate [ ]Severe  Constipation:                    [ ]Mild [ ]Moderate [x ]Severe    CPOT:    https://www.[facility].org/getattachment/[email or web address]/Critical-Care-Pain-Observation-Tool-(CPOT)    PAIN AD Score:	  http://geriatrictoolkit.[place].[facility]/cog/painad.pdf (Ctrl + left click to view)    Other Symptoms:  [x ]All other review of systems negative     Palliative Performance Status Version 2:    80     %      http://[facility].org/files/news/palliative_performance_scale_ppsv2.pdf  PHYSICAL EXAM:  Vital Signs Last 24 Hrs  T(C): 37.3 (2020 04:41), Max: 37.3 (10 Nov 2020 20:15)  T(F): 99.1 (2020 04:41), Max: 99.1 (10 Nov 2020 20:15)  HR: 115 (2020 04:41) (83 - 115)  BP: 97/59 (2020 04:41) (93/64 - 105/69)  BP(mean): --  RR: 18 (:41) (17 - 18)  SpO2: 95% (2020 04:41) (94% - 95%) I&O's Summary    10 Nov 2020 07:01  -  2020 07:00  --------------------------------------------------------  IN: 1950 mL / OUT: 0 mL / NET: 1950 mL      GENERAL: NAD, obese  HEENT:  anicteric, moist mucous membranes  CHEST/LUNG:  CTA b/l, no rales, wheezes, or rhonchi  HEART:  RRR, S1, S2  ABDOMEN:  BS+, soft, mildly tender to palp RUQ, nondistended  EXTREMITIES: no edema, cyanosis, or calf tenderness  NERVOUS SYSTEM: answers questions and follows commands appropriately  Psych: normal affect    LABS:                        14.4   15.08 )-----------( 384      ( 2020 09:18 )             44.6       139  |  108  |  9   ----------------------------<  106<H>  3.8   |  23  |  0.69    Ca    8.1<L>      2020 09:18    TPro  6.0  /  Alb  2.3<L>  /  TBili  1.0  /  DBili  x   /  AST  32  /  ALT  171<H>  /  AlkPhos  276<H>        Urinalysis Basic - ( 2020 08:38 )    Color: Yellow / Appearance: Slightly Turbid / S.015 / pH: x  Gluc: x / Ketone: Small  / Bili: Negative / Urobili: Negative   Blood: x / Protein: 15 / Nitrite: Negative   Leuk Esterase: Negative / RBC: x / WBC 0-2   Sq Epi: x / Non Sq Epi: Few / Bacteria: Few      RADIOLOGY & ADDITIONAL STUDIES: reviewed    Protein Calorie Malnutrition Present: [ ]mild [ ]moderate [ ]severe [ ]underweight [ ]morbid obesity  https://www.andeal.org/vault/2440/web/files/ONC/Table_Clinical%20Characteristics%20to%20Document%20Malnutrition-White%20JV%20et%20al%157694.pdf    Height (cm): 157.5 (20 @ 15:01)  Weight (kg): 79.4 (20 @ 12:39)  BMI (kg/m2): 32 (20 @ 15:01)    [ ]PPSV2 < or = 30%  [ ]significant weight loss [ ]poor nutritional intake [ ]anasarca   Albumin, Serum: 2.3 g/dL (20 @ 09:18)   [ ]Artificial Nutrition    REFERRALS:   [ ]Chaplaincy  [ ]Hospice  [ ]Child Life  [ ]Social Work  [ ]Case management [ ]Holistic Therapy     Goals of Care Document:

## 2020-11-11 NOTE — PROGRESS NOTE ADULT - PROBLEM SELECTOR PLAN 5
-Likely reactive  -Downtrending, but patient complaining of bladder fullness and slight dysuria- UA unimpressive, will check culture, if patient develops fever, would start abx

## 2020-11-11 NOTE — PROGRESS NOTE ADULT - ASSESSMENT
26 YO F with PMHX cholestasis of pregnancy (2 years ago), cholecystitis s/p cholecystectomy (POD#4 at [place]), presented with abd pain and N/V postoperatively, admitted for 5mm retained CBD stone, now presenting with pancreatitis s/p ERCP on 11/9.    1) Intractable abdominal pain 2/2 Pancreatitis & retained CBD stone s/p ERCP  2) Anxiety related to medical condition/hospitalization and pain  - lipase downtrending  - continue NPO status with ice chips  - GO & surgery f/u  - continue dilaudid gtt at 0.3mg/hr and dilaudid 0.5 mg IVP q4h prn  - continue PPI  - anticipatory guidance and education regarding pain provided    2) Constipation prophylaxis 2/2 opioid use  - senna 2 tabs PO qHS  - miralax prn    DIscussed with the primary team. Will continue to follow.    Giuliana Ingram MD 26 YO F with PMHX cholestasis of pregnancy (2 years ago), cholecystitis s/p cholecystectomy (POD#4 at [place]), presented with abd pain and N/V postoperatively, admitted for 5mm retained CBD stone, now presenting with pancreatitis s/p ERCP on 11/9.    1) Intractable abdominal pain 2/2 Pancreatitis & retained CBD stone s/p ERCP  2) Anxiety related to medical condition/hospitalization and pain  - improving  - lipase downtrending  - continue NPO status with ice chips  - GO & surgery f/u  - continue dilaudid gtt at 0.3mg/hr and dilaudid 0.5 mg IVP q4h prn  - continue PPI  - anticipatory guidance and education regarding pain provided    2) Constipation 2/2 opioid use  - senna 2 tabs PO qHS  - miralax daily  - bisacodyl supp. x1 now    DIscussed with the primary team. Will continue to follow.    Giuliana Ingram MD

## 2020-11-11 NOTE — PROGRESS NOTE ADULT - PROBLEM SELECTOR PLAN 1
-unresolved pain from pancreatic inflammation s/p ERCP  -lipase initially >46017, downtrending  -CT abdomen showed findings compatible with acute interstitial pancreatitis  -lactate 0.7  -leukocytosis likely reactive  -remain NPO with aggressive IV hydration  -dilaudid gtt at 0.3mg/hr as per palliative recs collaboration for complexity of care and pain management may go up to 0.5mg/hr if pain is severe or refractory but no higher tonight and no breakthrough pain at this time, to likely d/c in AM by palliative team and switch to PRN  -bowel regimen  -protonix 40 mg q daily switched to IV  -150 ml/hr NS IVF  -f/u CBC, CMP, lipase in am  -surgery, Dr. Kenney following, recommends no surgical intervention at this time  -GI, Dr. Delgadillo following, recommendations as above, will need eventual stent removal as outpatient

## 2020-11-12 DIAGNOSIS — R00.0 TACHYCARDIA, UNSPECIFIED: ICD-10-CM

## 2020-11-12 LAB
ALBUMIN SERPL ELPH-MCNC: 1.9 G/DL — LOW (ref 3.3–5)
ALP SERPL-CCNC: 213 U/L — HIGH (ref 40–120)
ALT FLD-CCNC: 98 U/L — HIGH (ref 12–78)
ANION GAP SERPL CALC-SCNC: 9 MMOL/L — SIGNIFICANT CHANGE UP (ref 5–17)
AST SERPL-CCNC: 20 U/L — SIGNIFICANT CHANGE UP (ref 15–37)
BASOPHILS # BLD AUTO: 0 K/UL — SIGNIFICANT CHANGE UP (ref 0–0.2)
BASOPHILS NFR BLD AUTO: 0 % — SIGNIFICANT CHANGE UP (ref 0–2)
BILIRUB SERPL-MCNC: 0.8 MG/DL — SIGNIFICANT CHANGE UP (ref 0.2–1.2)
BUN SERPL-MCNC: 8 MG/DL — SIGNIFICANT CHANGE UP (ref 7–23)
CALCIUM SERPL-MCNC: 8.1 MG/DL — LOW (ref 8.5–10.1)
CHLORIDE SERPL-SCNC: 104 MMOL/L — SIGNIFICANT CHANGE UP (ref 96–108)
CO2 SERPL-SCNC: 24 MMOL/L — SIGNIFICANT CHANGE UP (ref 22–31)
CREAT SERPL-MCNC: 0.6 MG/DL — SIGNIFICANT CHANGE UP (ref 0.5–1.3)
CULTURE RESULTS: SIGNIFICANT CHANGE UP
D DIMER BLD IA.RAPID-MCNC: 3360 NG/ML DDU — HIGH
EOSINOPHIL # BLD AUTO: 0.15 K/UL — SIGNIFICANT CHANGE UP (ref 0–0.5)
EOSINOPHIL NFR BLD AUTO: 1 % — SIGNIFICANT CHANGE UP (ref 0–6)
GLUCOSE SERPL-MCNC: 72 MG/DL — SIGNIFICANT CHANGE UP (ref 70–99)
HCT VFR BLD CALC: 38.2 % — SIGNIFICANT CHANGE UP (ref 34.5–45)
HGB BLD-MCNC: 12.8 G/DL — SIGNIFICANT CHANGE UP (ref 11.5–15.5)
LIDOCAIN IGE QN: 1270 U/L — HIGH (ref 73–393)
LYMPHOCYTES # BLD AUTO: 1.33 K/UL — SIGNIFICANT CHANGE UP (ref 1–3.3)
LYMPHOCYTES # BLD AUTO: 9 % — LOW (ref 13–44)
MCHC RBC-ENTMCNC: 29 PG — SIGNIFICANT CHANGE UP (ref 27–34)
MCHC RBC-ENTMCNC: 33.5 GM/DL — SIGNIFICANT CHANGE UP (ref 32–36)
MCV RBC AUTO: 86.4 FL — SIGNIFICANT CHANGE UP (ref 80–100)
MONOCYTES # BLD AUTO: 0.89 K/UL — SIGNIFICANT CHANGE UP (ref 0–0.9)
MONOCYTES NFR BLD AUTO: 6 % — SIGNIFICANT CHANGE UP (ref 2–14)
NEUTROPHILS # BLD AUTO: 12.31 K/UL — HIGH (ref 1.8–7.4)
NEUTROPHILS NFR BLD AUTO: 62 % — SIGNIFICANT CHANGE UP (ref 43–77)
NRBC # BLD: SIGNIFICANT CHANGE UP /100 WBCS (ref 0–0)
PLATELET # BLD AUTO: 331 K/UL — SIGNIFICANT CHANGE UP (ref 150–400)
POTASSIUM SERPL-MCNC: 4 MMOL/L — SIGNIFICANT CHANGE UP (ref 3.5–5.3)
POTASSIUM SERPL-SCNC: 4 MMOL/L — SIGNIFICANT CHANGE UP (ref 3.5–5.3)
PROT SERPL-MCNC: 5.6 G/DL — LOW (ref 6–8.3)
RBC # BLD: 4.42 M/UL — SIGNIFICANT CHANGE UP (ref 3.8–5.2)
RBC # FLD: 14.6 % — HIGH (ref 10.3–14.5)
SODIUM SERPL-SCNC: 137 MMOL/L — SIGNIFICANT CHANGE UP (ref 135–145)
SPECIMEN SOURCE: SIGNIFICANT CHANGE UP
WBC # BLD: 14.83 K/UL — HIGH (ref 3.8–10.5)
WBC # FLD AUTO: 14.83 K/UL — HIGH (ref 3.8–10.5)

## 2020-11-12 PROCEDURE — 99222 1ST HOSP IP/OBS MODERATE 55: CPT

## 2020-11-12 PROCEDURE — 99233 SBSQ HOSP IP/OBS HIGH 50: CPT

## 2020-11-12 PROCEDURE — 71275 CT ANGIOGRAPHY CHEST: CPT | Mod: 26

## 2020-11-12 PROCEDURE — 93970 EXTREMITY STUDY: CPT | Mod: 26

## 2020-11-12 RX ORDER — ENOXAPARIN SODIUM 100 MG/ML
40 INJECTION SUBCUTANEOUS DAILY
Refills: 0 | Status: DISCONTINUED | OUTPATIENT
Start: 2020-11-12 | End: 2020-11-17

## 2020-11-12 RX ORDER — HYDROMORPHONE HYDROCHLORIDE 2 MG/ML
0.2 INJECTION INTRAMUSCULAR; INTRAVENOUS; SUBCUTANEOUS EVERY 4 HOURS
Refills: 0 | Status: DISCONTINUED | OUTPATIENT
Start: 2020-11-12 | End: 2020-11-16

## 2020-11-12 RX ORDER — HYDROMORPHONE HYDROCHLORIDE 2 MG/ML
0.5 INJECTION INTRAMUSCULAR; INTRAVENOUS; SUBCUTANEOUS EVERY 4 HOURS
Refills: 0 | Status: DISCONTINUED | OUTPATIENT
Start: 2020-11-12 | End: 2020-11-16

## 2020-11-12 RX ORDER — METHYLNALTREXONE BROMIDE 12 MG/.6ML
12 INJECTION, SOLUTION SUBCUTANEOUS ONCE
Refills: 0 | Status: COMPLETED | OUTPATIENT
Start: 2020-11-12 | End: 2020-11-12

## 2020-11-12 RX ORDER — HYDROMORPHONE HYDROCHLORIDE 2 MG/ML
0.5 INJECTION INTRAMUSCULAR; INTRAVENOUS; SUBCUTANEOUS EVERY 6 HOURS
Refills: 0 | Status: DISCONTINUED | OUTPATIENT
Start: 2020-11-12 | End: 2020-11-12

## 2020-11-12 RX ORDER — HYDROMORPHONE HYDROCHLORIDE 2 MG/ML
1 INJECTION INTRAMUSCULAR; INTRAVENOUS; SUBCUTANEOUS EVERY 6 HOURS
Refills: 0 | Status: DISCONTINUED | OUTPATIENT
Start: 2020-11-12 | End: 2020-11-12

## 2020-11-12 RX ADMIN — HYDROMORPHONE HYDROCHLORIDE 0.5 MILLIGRAM(S): 2 INJECTION INTRAMUSCULAR; INTRAVENOUS; SUBCUTANEOUS at 17:25

## 2020-11-12 RX ADMIN — HYDROMORPHONE HYDROCHLORIDE 0.5 MILLIGRAM(S): 2 INJECTION INTRAMUSCULAR; INTRAVENOUS; SUBCUTANEOUS at 21:26

## 2020-11-12 RX ADMIN — HYDROMORPHONE HYDROCHLORIDE 0.5 MILLIGRAM(S): 2 INJECTION INTRAMUSCULAR; INTRAVENOUS; SUBCUTANEOUS at 13:20

## 2020-11-12 RX ADMIN — PANTOPRAZOLE SODIUM 40 MILLIGRAM(S): 20 TABLET, DELAYED RELEASE ORAL at 11:49

## 2020-11-12 RX ADMIN — Medication 5 MILLIGRAM(S): at 11:40

## 2020-11-12 RX ADMIN — HYDROMORPHONE HYDROCHLORIDE 0.5 MILLIGRAM(S): 2 INJECTION INTRAMUSCULAR; INTRAVENOUS; SUBCUTANEOUS at 21:41

## 2020-11-12 RX ADMIN — HYDROMORPHONE HYDROCHLORIDE 0.5 MILLIGRAM(S): 2 INJECTION INTRAMUSCULAR; INTRAVENOUS; SUBCUTANEOUS at 17:10

## 2020-11-12 RX ADMIN — HYDROMORPHONE HYDROCHLORIDE 0.5 MILLIGRAM(S): 2 INJECTION INTRAMUSCULAR; INTRAVENOUS; SUBCUTANEOUS at 13:07

## 2020-11-12 RX ADMIN — METHYLNALTREXONE BROMIDE 12 MILLIGRAM(S): 12 INJECTION, SOLUTION SUBCUTANEOUS at 11:54

## 2020-11-12 NOTE — CONSULT NOTE ADULT - REASON FOR ADMISSION
retained CBD stone s/p cholecystectomy

## 2020-11-12 NOTE — PROGRESS NOTE ADULT - PROBLEM/PLAN-7
"Chief Complaint   Patient presents with     Dizziness     Patient here for dizziness and nausea. Patient states she has also been sweating and has a \"humming\" noise with the dizziness.       Latanya Fisher CMA at 1:59 PM on 2/21/2017.  "
DISPLAY PLAN FREE TEXT

## 2020-11-12 NOTE — PROGRESS NOTE ADULT - ASSESSMENT
26 YO F with PMHX cholestasis of pregnancy (2 years ago), cholecystitis s/p cholecystectomy (at [facility]), presented with abd pain and N/V postoperatively, admitted for 5mm retained CBD stone, now presenting with pancreatitis s/p ERCP on 11/9.

## 2020-11-12 NOTE — CONSULT NOTE ADULT - SUBJECTIVE AND OBJECTIVE BOX
[facility]  INFECTIOUS DISEASES   [address] [place] [address]  Tel: [phone]     Fax: [phone]  =======================================================    [id number]  DAVID DRISCOLL     CC: Pancreatitis, abdominal pain, leukocytosis     HPI:  25 T/o woman with PMH of cholestasis of pregnancy 2 years ago, cholecystitis s/p cholecystectomy on  in [place], was admitted with acute RUQ pain on . She was diagnosed with retained CBD stone and had ERCP for remove stone with stent placement on . After procedure she had pancreatitis with severe epigastric pain, lipase went up to >87694. The same day had leukocytosis as well. THis morning blood and urine cultures are sent and ID was called for further recommendation.    She is on clear liquid diet tolerating well, still has abdominal pain but lipase in going down.     PAST MEDICAL & SURGICAL HISTORY:  H/O cholecystitis  History of cholestasis during pregnancy  S/P D&amp;C (status post dilation and curettage)  x2 for miscarriages  History of cholecystectomy    Social Hx: no smoking, ETOH or drugs     FAMILY HISTORY:  No pertinent family history in first degree relatives    Allergies  cephalosporins (Rash (Moderate))  Cipro (Rash (Moderate))    Antibiotics:  None      REVIEW OF SYSTEMS:  CONSTITUTIONAL:  No Fever or chills  HEENT:  No diplopia or blurred vision.  No sore throat or runny nose.  CARDIOVASCULAR:  No chest pain or SOB.  RESPIRATORY:  No cough, shortness of breath, PND or orthopnea.  GASTROINTESTINAL:  No nausea, vomiting or diarrhea. + abdominal pain   GENITOURINARY:  No dysuria, frequency or urgency. No Blood in urine  MUSCULOSKELETAL:  no joint aches, no muscle pain  SKIN:  No change in skin, hair or nails.  NEUROLOGIC:  No paresthesias, fasciculations, seizures or weakness.  PSYCHIATRIC:  No disorder of thought or mood.  ENDOCRINE:  No heat or cold intolerance, polyuria or polydipsia.  HEMATOLOGICAL:  No easy bruising or bleeding.     Physical Exam:  Vital Signs Last 24 Hrs  T(C): 36.6 (2020 13:58), Max: 37.1 (2020 21:09)  T(F): 97.8 (2020 13:58), Max: 98.7 (2020 21:09)  HR: 125 (2020 13:58) (114 - 130)  BP: 111/75 (2020 13:58) (110/70 - 111/75)  RR: 18 (:58) (17 - 19)  SpO2: 94% (:58) (92% - 95%)  GEN: NAD  HEENT: normocephalic and atraumatic. EOMI. PERRL.    NECK: Supple.  No lymphadenopathy   LUNGS: Clear to auscultation.  HEART: Regular rate and rhythm without murmur.  ABDOMEN: Soft, epigastric tenderness, nondistended.  Positive bowel sounds.    : No CVA tenderness  EXTREMITIES: Without any cyanosis, clubbing, rash, lesions or edema.  NEUROLOGIC: grossly intact.  PSYCHIATRIC: Appropriate affect .  SKIN: No ulceration or induration present.    Labs:      137  |  104  |  8   ----------------------------<  72  4.0   |  24  |  0.60    Ca    8.1<L>      2020 08:50    TPro  5.6<L>  /  Alb  1.9<L>  /  TBili  0.8  /  DBili  x   /  AST  20  /  ALT  98<H>  /  AlkPhos  213<H>                          12.8   14.83 )-----------( 331      ( 2020 08:50 )             38.2     Urinalysis Basic - ( 2020 08:38 )    Color: Yellow / Appearance: Slightly Turbid / S.015 / pH: x  Gluc: x / Ketone: Small  / Bili: Negative / Urobili: Negative   Blood: x / Protein: 15 / Nitrite: Negative   Leuk Esterase: Negative / RBC: x / WBC 0-2   Sq Epi: x / Non Sq Epi: Few / Bacteria: Few    LIVER FUNCTIONS - ( 2020 08:50 )  Alb: 1.9 g/dL / Pro: 5.6 g/dL / ALK PHOS: 213 U/L / ALT: 98 U/L / AST: 20 U/L / GGT: x           Procalcitonin, Serum: <0.05 (20 @ 08:38    COVID-19 IgG Antibody Index: 0.09 Index (20 @ 14:09)  COVID-19 IgG Antibody Interpretation: Negative (20 @ 14:09)  COVID-19 PCR: NotDetec (20 @ 06:03)    All imaging and other data have been reviewed.  < from: CT Abdomen and Pelvis No Cont (11.10.20 @ 01:36) >  IMPRESSION:  Status post cholecystectomy.  Findings compatible with acute interstitial pancreatitis.  The evaluation for pancreatic necrosis is limited without intravenous contrast.  Free intraperitoneal air, likely related to recent postoperative status.  Small amount of free fluid.    Assessment and Plan:   25 T/o woman with PMH of cholestasis of pregnancy 2 years ago, cholecystitis s/p cholecystectomy on  in [place], was admitted with acute RUQ pain on . She was diagnosed with retained CBD stone and had ERCP for remove stone with stent placement on . After procedure she had pancreatitis with severe epigastric pain, lipase went up to >04986. The same day had leukocytosis as well.   I believe leukocytosis is reactional to inflammation in pancreas, as lipase is going down and inflammation is resolving, WBC will start trending down as well. If leukocytosis continues with persistent fever or any positive culture will start her on ABx otherwise will watch off antibiotics.     Pancreatitis, CBD stone s/p ERCP and stent   - Blood and urine cultures are pending  - Will follow leukocytosis   - If worsening will repeat abdominal imagining to rule out any collection or necrosis  - Will watch off antibiotics.   - GI on board, will follow lipase.     Thank you for courtesy of this consult.     Will follow.    Ting Olea MD  Division of Infectious Diseases   Cell [phone] between 8am and 6pm   After 6pm and weekends please call ID service at [phone].

## 2020-11-12 NOTE — PROGRESS NOTE ADULT - PROBLEM SELECTOR PLAN 2
-s/p cholecystectomy POD#5  -s/p ERCP yesterday, stone removal, CBD stent placement  -zofran 4 mg q6 PRN for nausea and or vomiting   -Dilaudid as above  -surgery Dr. Kenney following  -GI Dr. Delgadillo following -s/p cholecystectomy POD#6  -s/p ERCP yesterday, stone removal, CBD stent placement  -zofran 4 mg q6 PRN for nausea and or vomiting   -Dilaudid as above  -surgery Dr. Kenney following  -GI Dr. Delgadillo following

## 2020-11-12 NOTE — PROGRESS NOTE ADULT - PROBLEM SELECTOR PLAN 6
-dvt ppx SCDs, encourage ambulation  -GI protonix 40 mg q daily
-Likely 2/2 pain  -CTA done 11/12 negative for PE  -Continue aggressive fluid hydration

## 2020-11-12 NOTE — PROGRESS NOTE ADULT - SUBJECTIVE AND OBJECTIVE BOX
INTERVAL HPI/OVERNIGHT EVENTS:  Patient seen and examined at bedside. Overnight, patient with severe constipation, received an enema and had small BM with relief. Patient also noted to be tachycardic overnight, LE dopplers negative but elevated D-dimer. Patient had CTA which was negative for PE, but noted to have "serpiginous focus of high attenuation in the stomach could represent ingested material or a small focus of active hemorrhage". Patient denies any hematemesis or blood in stool. Patient still with abdominal pain/tenderness, but states it is improving. Discussed with GI/Surgery, doubt active hemorrhage as patient's Hgb has been stable.    MEDICATIONS  (STANDING):  enoxaparin Injectable 40 milliGRAM(s) SubCutaneous daily  pantoprazole  Injectable 40 milliGRAM(s) IV Push daily  sodium chloride 0.9%. 1000 milliLiter(s) (150 mL/Hr) IV Continuous <Continuous>    MEDICATIONS  (PRN):  HYDROmorphone  Injectable 0.5 milliGRAM(s) IV Push every 4 hours PRN Severe Pain (7 - 10)  HYDROmorphone  Injectable 0.2 milliGRAM(s) IV Push every 4 hours PRN Moderate Pain (4 - 6)  ondansetron Injectable 4 milliGRAM(s) IV Push every 4 hours PRN Nausea and/or Vomiting      Allergies    cephalosporins (Rash (Moderate))  Cipro (Rash (Moderate))    Intolerances      ROS:  CONSTITUTIONAL: admits weakness, no fevers or chills  EYES/ENT: No visual changes;  No vertigo or throat pain   NECK: No pain or stiffness  RESPIRATORY: No cough, wheezing, hemoptysis; No shortness of breath  CARDIOVASCULAR: No chest pain or palpitations  GASTROINTESTINAL: admits epigastric pain. No nausea, vomiting, or hematemesis; admits constipation  GENITOURINARY: No dysuria, frequency or hematuria  NEUROLOGICAL: No numbness   SKIN: No itching, burning, rashes, or lesions   All other review of systems is negative unless indicated above.    Vital Signs Last 24 Hrs  T(C): 36.6 (2020 13:58), Max: 37.1 (2020 21:09)  T(F): 97.8 (2020 13:58), Max: 98.7 (2020 21:09)  HR: 125 (2020 13:58) (114 - 130)  BP: 111/75 (2020 13:58) (110/70 - 111/75)  BP(mean): --  RR: 18 (2020 13:58) (17 - 19)  SpO2: 94% (2020 13:58) (92% - 95%)     @ 07:01  -   @ 07:00  --------------------------------------------------------  IN: 3750.3 mL / OUT: 0 mL / NET: 3750.3 mL     @ 07:01  -   @ 16:43  --------------------------------------------------------  IN: 600.9 mL / OUT: 0 mL / NET: 600.9 mL      Physical Exam:  General: WN/WD NAD  Neurology: A&Ox3, nonfocal, GREEN x 4  Respiratory: CTA B/L  CV: RRR, S1S2, no murmurs, rubs or gallops  Abdominal: Soft, +TTP epigastric region, no rebound, no guarding, ND +BS  Extremities: No edema, + peripheral pulses        LABS:                        12.8   14.83 )-----------( 331      ( 2020 08:50 )             38.2         137  |  104  |  8   ----------------------------<  72  4.0   |  24  |  0.60    Ca    8.1<L>      2020 08:50    TPro  5.6<L>  /  Alb  1.9<L>  /  TBili  0.8  /  DBili  x   /  AST  20  /  ALT  98<H>  /  AlkPhos  213<H>        Urinalysis Basic - ( 2020 08:38 )    Color: Yellow / Appearance: Slightly Turbid / S.015 / pH: x  Gluc: x / Ketone: Small  / Bili: Negative / Urobili: Negative   Blood: x / Protein: 15 / Nitrite: Negative   Leuk Esterase: Negative / RBC: x / WBC 0-2   Sq Epi: x / Non Sq Epi: Few / Bacteria: Few        RADIOLOGY & ADDITIONAL TESTS:

## 2020-11-12 NOTE — PROGRESS NOTE ADULT - ASSESSMENT
26 YO F with PMHX cholestasis of pregnancy (2 years ago), cholecystitis s/p cholecystectomy (POD#4 at [place]), presented with abd pain and N/V postoperatively, admitted for 5mm retained CBD stone, now presenting with pancreatitis s/p ERCP on 11/9.    1) Intractable abdominal pain 2/2 Pancreatitis & retained CBD stone s/p ERCP  2) Anxiety related to medical condition/hospitalization and pain  - improving  - lipase downtrending  - on clears  - GO & surgery f/u  - d/c dilaudid gtt   - start dilaudid 0.2mg q4h prn moderate pain and 0.5 mg IVP q4h prn severe pain > plan to switch to PO route tomorrow  - continue PPI  - anticipatory guidance and education regarding pain provided    2) Constipation 2/2 opioid use  - senna 2 tabs PO qHS  - miralax daily  - bisacodyl supp. prn  - s/p fleet enema 11/11 with small BM afterwards    DIscussed with the primary team. Will continue to follow.    Giuliana Ingram MD

## 2020-11-12 NOTE — PROVIDER CONTACT NOTE (OTHER) - ACTION/TREATMENT ORDERED:
will assess pt
doppler, ddimer
maalox ordered, pt c/o increase in pain, tramadol ordered, pt continue having increasing pain, dilaudid, ct abd, lipase ordered.

## 2020-11-12 NOTE — PROGRESS NOTE ADULT - SUBJECTIVE AND OBJECTIVE BOX
SUBJECTIVE AND OBJECTIVE:  INTERVAL HPI/OVERNIGHT EVENTS:    DNR on chart:   Allergies    cephalosporins (Rash (Moderate))  Cipro (Rash (Moderate))    Intolerances    MEDICATIONS  (STANDING):  enoxaparin Injectable 40 milliGRAM(s) SubCutaneous daily  methylnaltrexone Injectable 12 milliGRAM(s) SubCutaneous once  metoclopramide Injectable 5 milliGRAM(s) IV Push every 6 hours  pantoprazole  Injectable 40 milliGRAM(s) IV Push daily  sodium chloride 0.9%. 1000 milliLiter(s) (150 mL/Hr) IV Continuous <Continuous>    MEDICATIONS  (PRN):  HYDROmorphone  Injectable 1 milliGRAM(s) IV Push every 6 hours PRN Severe Pain (7 - 10)  ondansetron Injectable 4 milliGRAM(s) IV Push every 4 hours PRN Nausea and/or Vomiting      ITEMS UNCHECKED ARE NOT PRESENT    PRESENT SYMPTOMS: [ ]Unable to obtain due to poor mentation   Source if other than patient:  [ ]Family   [ ]Team     Pain:  [ ]yes [ ]no  QOL impact -   Location -                    Aggravating factors -  Quality -  Radiation -  Timing-  Severity (0-10 scale):  Minimal acceptable level (0-10 scale):     Dyspnea:                           [ ]Mild [ ]Moderate [ ]Severe  Anxiety:                             [ ]Mild [ ]Moderate [ ]Severe  Fatigue:                             [ ]Mild [ ]Moderate [ ]Severe  Nausea:                             [ ]Mild [ ]Moderate [ ]Severe  Loss of appetite:              [ ]Mild [ ]Moderate [ ]Severe  Constipation:                    [ ]Mild [ ]Moderate [ ]Severe    CPOT:    https://www.[facility].org/getattachment/[email or web address]/Critical-Care-Pain-Observation-Tool-(CPOT)    PAIN AD Score:	  http://geriatrictoolkit.[place].[facility]/cog/painad.pdf (Ctrl + left click to view)    Other Symptoms:  [ ]All other review of systems negative     Palliative Performance Status Version 2:         %      http://npcrc.org/files/news/palliative_performance_scale_ppsv2.pdf  PHYSICAL EXAM:  Vital Signs Last 24 Hrs  T(C): 36.8 (2020 05:52), Max: 37.1 (2020 21:09)  T(F): 98.3 (2020 05:52), Max: 98.7 (2020 21:09)  HR: 118 (2020 05:52) (113 - 130)  BP: 110/70 (2020 05:52) (93/62 - 111/74)  BP(mean): --  RR: 19 (2020 05:52) (17 - 19)  SpO2: 92% (2020 05:52) (92% - 95%) I&O's Summary    2020 07:01  -  2020 07:00  --------------------------------------------------------  IN: 3750 mL / OUT: 0 mL / NET: 3750 mL       GENERAL:  [ ]Alert  [ ]Oriented x   [ ]Lethargic  [ ]Cachexia  [ ]Unarousable  [ ]Verbal  [ ]Non-Verbal  Behavioral:   [ ]Anxiety  [ ]Delirium [ ]Agitation [ ]Other  HEENT:  [ ]Normal   [ ]Dry mouth   [ ]ET Tube/Trach  [ ]Oral lesions  PULMONARY:   [ ]Clear [ ]Tachypnea  [ ]Audible excessive secretions   [ ]Rhonchi        [ ]Right [ ]Left [ ]Bilateral  [ ]Crackles        [ ]Right [ ]Left [ ]Bilateral  [ ]Wheezing     [ ]Right [ ]Left [ ]Bilateral  [ ]Diminished BS [ ] Right [ ]Left [ ]Bilateral  CARDIOVASCULAR:    [ ]Regular [ ]Irregular [ ]Tachy  [ ]Phi [ ]Murmur [ ]Other  GASTROINTESTINAL:  [ ]Soft  [ ]Distended   [ ]+BS  [ ]Non tender [ ]Tender  [ ]PEG [ ]OGT/ NGT   Last BM:      GENITOURINARY:  [ ]Normal [ ]Incontinent   [ ]Oliguria/Anuria   [ ]Fraga  MUSCULOSKELETAL:   [ ]Normal   [ ]Weakness  [ ]Bed/Wheelchair bound [ ]Edema  NEUROLOGIC:   [ ]No focal deficits  [ ] Cognitive impairment  [ ] Dysphagia [ ]Dysarthria [ ] Paresis [ ]Other   SKIN:   [ ]Normal  [ ]Rash   [ ]Pressure ulcer(s) [ ]y [ ]n present on admission    CRITICAL CARE:  [ ]Shock Present  [ ]Septic [ ]Cardiogenic [ ]Neurologic [ ]Hypovolemic  [ ]Vasopressors [ ]Inotropes  [ ]Respiratory failure present [ ]Mechanical Ventilation [ ]Non-invasive ventilatory support [ ]High-Flow   [ ]Acute  [ ]Chronic [ ]Hypoxic  [ ]Hypercarbic [ ]Other  [ ]Other organ failure     LABS:                        12.8   14.83 )-----------( 331      ( 2020 08:50 )             38.2       137  |  104  |  8   ----------------------------<  72  4.0   |  24  |  0.60    Ca    8.1<L>      2020 08:50    TPro  5.6<L>  /  Alb  1.9<L>  /  TBili  0.8  /  DBili  x   /  AST  20  /  ALT  98<H>  /  AlkPhos  213<H>        Urinalysis Basic - ( 2020 08:38 )    Color: Yellow / Appearance: Slightly Turbid / S.015 / pH: x  Gluc: x / Ketone: Small  / Bili: Negative / Urobili: Negative   Blood: x / Protein: 15 / Nitrite: Negative   Leuk Esterase: Negative / RBC: x / WBC 0-2   Sq Epi: x / Non Sq Epi: Few / Bacteria: Few      RADIOLOGY & ADDITIONAL STUDIES:    Protein Calorie Malnutrition Present: [ ]mild [ ]moderate [ ]severe [ ]underweight [ ]morbid obesity  https://www.andeal.org/vault/2440/web/files/ONC/Table_Clinical%20Characteristics%20to%20Document%20Malnutrition-White%20JV%20et%20al%479966.pdf    Height (cm): 157.5 (20 @ 15:01)  Weight (kg): 79.4 (20 @ 12:39)  BMI (kg/m2): 32 (20 @ 15:01)    [ ]PPSV2 < or = 30%  [ ]significant weight loss [ ]poor nutritional intake [ ]anasarca   Albumin, Serum: 1.9 g/dL (20 @ 08:50)   [ ]Artificial Nutrition    REFERRALS:   [ ]Chaplaincy  [ ]Hospice  [ ]Child Life  [ ]Social Work  [ ]Case management [ ]Holistic Therapy     Goals of Care Document:     SUBJECTIVE AND OBJECTIVE/INTERVAL HPI/OVERNIGHT EVENTS:  - no acute events overnight  - s/p enema x1 last night > small BM with relief. No N/V. Advanced to clears. Abd pain improving.     DNR on chart:   Allergies    cephalosporins (Rash (Moderate))  Cipro (Rash (Moderate))    Intolerances    MEDICATIONS  (STANDING):  enoxaparin Injectable 40 milliGRAM(s) SubCutaneous daily  methylnaltrexone Injectable 12 milliGRAM(s) SubCutaneous once  metoclopramide Injectable 5 milliGRAM(s) IV Push every 6 hours  pantoprazole  Injectable 40 milliGRAM(s) IV Push daily  sodium chloride 0.9%. 1000 milliLiter(s) (150 mL/Hr) IV Continuous <Continuous>    MEDICATIONS  (PRN):  HYDROmorphone  Injectable 1 milliGRAM(s) IV Push every 6 hours PRN Severe Pain (7 - 10)  ondansetron Injectable 4 milliGRAM(s) IV Push every 4 hours PRN Nausea and/or Vomiting      ITEMS UNCHECKED ARE NOT PRESENT    PRESENT SYMPTOMS: [ ]Unable to obtain due to poor mentation   Source if other than patient:  [ ]Family   [ ]Team     Pain:  [x ]yes [ ]no  QOL impact - yes  Location -     epigastric               Aggravating factors - food  Quality - sharp  Radiation - no  Timing- constant  Severity (0-10 scale): 5/10  Minimal acceptable level (0-10 scale): 5/10    Dyspnea:                           [ ]Mild [ ]Moderate [ ]Severe  Anxiety:                             [ ]Mild [ ]Moderate [ ]Severe  Fatigue:                             [ ]Mild [ ]Moderate [ ]Severe  Nausea:                             [ ]Mild [ ]Moderate [ ]Severe  Loss of appetite:              [ ]Mild [ ]Moderate [ ]Severe  Constipation:                    [ ]Mild [ ]Moderate [ ]Severe    CPOT:    https://www.[facility].org/getattachment/[email or web address]/Critical-Care-Pain-Observation-Tool-(CPOT)    PAIN AD Score:	  http://geriatrictoolkit.[place].[facility]/cog/painad.pdf (Ctrl + left click to view)    Other Symptoms:  [ x]All other review of systems negative     Palliative Performance Status Version 2:     80    %      http://npcrc.org/files/news/palliative_performance_scale_ppsv2.pdf  PHYSICAL EXAM:  Vital Signs Last 24 Hrs  T(C): 36.8 (2020 05:52), Max: 37.1 (2020 21:09)  T(F): 98.3 (2020 05:52), Max: 98.7 (2020 21:09)  HR: 118 (2020 05:52) (113 - 130)  BP: 110/70 (2020 05:52) (93/62 - 111/74)  BP(mean): --  RR: 19 (2020 05:52) (17 - 19)  SpO2: 92% (2020 05:52) (92% - 95%) I&O's Summary    2020 07:01  -  2020 07:00  --------------------------------------------------------  IN: 3750 mL / OUT: 0 mL / NET: 3750 mL    GENERAL: NAD, obese  HEENT:  anicteric, moist mucous membranes  CHEST/LUNG:  CTA b/l, no rales, wheezes, or rhonchi  HEART:  RRR, S1, S2  ABDOMEN:  BS+, soft, mildly tender to palp RUQ, nondistended  EXTREMITIES: no edema, cyanosis, or calf tenderness  NERVOUS SYSTEM: answers questions and follows commands appropriately  Psych: normal affect    LABS:                        12.8   14.83 )-----------( 331      ( 2020 08:50 )             38.2       137  |  104  |  8   ----------------------------<  72  4.0   |  24  |  0.60    Ca    8.1<L>      2020 08:50    TPro  5.6<L>  /  Alb  1.9<L>  /  TBili  0.8  /  DBili  x   /  AST  20  /  ALT  98<H>  /  AlkPhos  213<H>        Urinalysis Basic - ( 2020 08:38 )    Color: Yellow / Appearance: Slightly Turbid / S.015 / pH: x  Gluc: x / Ketone: Small  / Bili: Negative / Urobili: Negative   Blood: x / Protein: 15 / Nitrite: Negative   Leuk Esterase: Negative / RBC: x / WBC 0-2   Sq Epi: x / Non Sq Epi: Few / Bacteria: Few      RADIOLOGY & ADDITIONAL STUDIES: reviewed    Protein Calorie Malnutrition Present: [ ]mild [ ]moderate [ ]severe [ ]underweight [ ]morbid obesity  https://www.andeal.org/vault/2440/web/files/ONC/Table_Clinical%20Characteristics%20to%20Document%20Malnutrition-White%20JV%20et%20al%553857.pdf    Height (cm): 157.5 (20 @ 15:01)  Weight (kg): 79.4 (20 @ 12:39)  BMI (kg/m2): 32 (20 @ 15:01)    [ ]PPSV2 < or = 30%  [ ]significant weight loss [ ]poor nutritional intake [ ]anasarca   Albumin, Serum: 1.9 g/dL (20 @ 08:50)   [ ]Artificial Nutrition    REFERRALS:   [ ]Chaplaincy  [ ]Hospice  [ ]Child Life  [ ]Social Work  [ ]Case management [ ]Holistic Therapy     Goals of Care Document:

## 2020-11-12 NOTE — PROGRESS NOTE ADULT - PROBLEM SELECTOR PLAN 1
-unresolved pain from pancreatic inflammation s/p ERCP  -lipase initially >28588, downtrending  -CT abdomen showed findings compatible with acute interstitial pancreatitis  -lactate 0.7  -leukocytosis likely reactive  -remain NPO with aggressive IV hydration  -dilaudid gtt at 0.3mg/hr as per palliative recs collaboration for complexity of care and pain management may go up to 0.5mg/hr if pain is severe or refractory but no higher tonight and no breakthrough pain at this time, to likely d/c in AM by palliative team and switch to PRN  -bowel regimen  -protonix 40 mg q daily switched to IV  -150 ml/hr NS IVF  -f/u CBC, CMP, lipase in am  -surgery, Dr. Kenney following, recommends no surgical intervention at this time  -GI, Dr. Delgadillo following, recommendations as above, will need eventual stent removal as outpatient -unresolved pain from pancreatic inflammation s/p ERCP  -lipase initially >59350, downtrending  -CT abdomen showed findings compatible with acute interstitial pancreatitis  -lactate 0.7  -leukocytosis likely reactive  -advanced to clears as per GI  -dilaudid gtt stopped and switched to PRN dosing  -bowel regimen  -protonix 40 mg q daily switched to IV  -150 ml/hr NS IVF  -f/u CBC, BMP, lipase in am  -surgery, Dr. Kenney following, recommends no surgical intervention at this time  -GI, Dr. Delgadillo following, recommendations as above, will need eventual stent removal as outpatient

## 2020-11-12 NOTE — PROGRESS NOTE ADULT - SUBJECTIVE AND OBJECTIVE BOX
INTERVAL HPI/OVERNIGHT EVENTS:  pt seen and examined  interim evens noted, vs reviewed  still w abd pain but some improvement now 7-8/10  + nausea wo vomiting  sp enema yesterday w small amount of non bloody stool thereafter but felt relief  tolerating some ice chips    MEDICATIONS  (STANDING):  enoxaparin Injectable 40 milliGRAM(s) SubCutaneous daily  HYDROmorphone Infusion 0.3 mG/Hr (0.3 mL/Hr) IV Continuous <Continuous>  metoclopramide Injectable 5 milliGRAM(s) IV Push every 6 hours  pantoprazole  Injectable 40 milliGRAM(s) IV Push daily  polyethylene glycol 3350 17 Gram(s) Oral daily  senna 2 Tablet(s) Oral at bedtime  sodium chloride 0.9%. 1000 milliLiter(s) (150 mL/Hr) IV Continuous <Continuous>    MEDICATIONS  (PRN):  bisacodyl Suppository 10 milliGRAM(s) Rectal daily PRN Constipation  ondansetron Injectable 4 milliGRAM(s) IV Push every 4 hours PRN Nausea and/or Vomiting      Allergies    cephalosporins (Rash (Moderate))  Cipro (Rash (Moderate))    Intolerances        Review of Systems:    General:  No wt loss, fevers, chills, night sweats, fatigue   Eyes:  Good vision, no reported pain  ENT:  No sore throat, pain, runny nose, dysphagia  CV:  No pain, palpitations, hypo/hypertension  Resp:  No dyspnea, cough, tachypnea, wheezing  GI:  see above   :  No pain, bleeding, incontinence, nocturia  Muscle:  No pain, weakness  Neuro:  No weakness, tingling, memory problems  Psych:  No fatigue, insomnia, mood problems, depression  Endocrine:  No polyuria, polydypsia, cold/heat intolerance  Heme:  No petechiae, ecchymosis, easy bruisability  Skin:  No rash, tattoos, scars, edema      Vital Signs Last 24 Hrs  T(C): 36.8 (2020 05:52), Max: 37.1 (2020 21:09)  T(F): 98.3 (2020 05:52), Max: 98.7 (2020 21:09)  HR: 118 (2020 05:52) (113 - 130)  BP: 110/70 (2020 05:52) (93/62 - 111/74)  BP(mean): --  RR: 19 (2020 05:52) (17 - 19)  SpO2: 92% (2020 05:52) (92% - 95%)    PHYSICAL EXAM:  Constitutional: lying in bed   HEENT: ncat  Gastrointestinal: soft generalized ttp most prominent epigastric region nd  Extremities: No peripheral edema  Vascular: + peripheral pulses  Neurological: A/O x 3      LABS:                        14.4   15.08 )-----------( 384      ( 2020 09:18 )             44.6         139  |  108  |  9   ----------------------------<  106<H>  3.8   |  23  |  0.69    Ca    8.1<L>      2020 09:18    TPro  6.0  /  Alb  2.3<L>  /  TBili  1.0  /  DBili  x   /  AST  32  /  ALT  171<H>  /  AlkPhos  276<H>        Urinalysis Basic - ( 2020 08:38 )    Color: Yellow / Appearance: Slightly Turbid / S.015 / pH: x  Gluc: x / Ketone: Small  / Bili: Negative / Urobili: Negative   Blood: x / Protein: 15 / Nitrite: Negative   Leuk Esterase: Negative / RBC: x / WBC 0-2   Sq Epi: x / Non Sq Epi: Few / Bacteria: Few        RADIOLOGY & ADDITIONAL TESTS:

## 2020-11-12 NOTE — PROGRESS NOTE ADULT - PROBLEM SELECTOR PLAN 5
-Likely reactive  -Downtrending, but patient complaining of bladder fullness and slight dysuria- UA unimpressive, will check culture, if patient develops fever, would start abx -Likely reactive  -Downtrending, but patient complaining of bladder fullness and slight dysuria- UA unimpressive, will check culture, if patient develops fever, would start abx  -Urine cx negative  -Patient with bandemia today, ID consulted, recommend to monitor off abx, will check blood cx

## 2020-11-12 NOTE — PROVIDER CONTACT NOTE (OTHER) - SITUATION
pt c/o abd pain
Pt c/o abdominal pain. Pt ate meal an hour ago and started having abdominal discomfort.
Pt with elevated HR

## 2020-11-13 DIAGNOSIS — K85.90 ACUTE PANCREATITIS WITHOUT NECROSIS OR INFECTION, UNSPECIFIED: ICD-10-CM

## 2020-11-13 DIAGNOSIS — E86.9 VOLUME DEPLETION, UNSPECIFIED: ICD-10-CM

## 2020-11-13 LAB
ALBUMIN SERPL ELPH-MCNC: 1.7 G/DL — LOW (ref 3.3–5)
ALP SERPL-CCNC: 172 U/L — HIGH (ref 40–120)
ALT FLD-CCNC: 71 U/L — SIGNIFICANT CHANGE UP (ref 12–78)
ANION GAP SERPL CALC-SCNC: 9 MMOL/L — SIGNIFICANT CHANGE UP (ref 5–17)
AST SERPL-CCNC: 26 U/L — SIGNIFICANT CHANGE UP (ref 15–37)
BASOPHILS # BLD AUTO: 0.05 K/UL — SIGNIFICANT CHANGE UP (ref 0–0.2)
BASOPHILS NFR BLD AUTO: 0.3 % — SIGNIFICANT CHANGE UP (ref 0–2)
BILIRUB SERPL-MCNC: 0.8 MG/DL — SIGNIFICANT CHANGE UP (ref 0.2–1.2)
BUN SERPL-MCNC: 5 MG/DL — LOW (ref 7–23)
CALCIUM SERPL-MCNC: 8.3 MG/DL — LOW (ref 8.5–10.1)
CHLORIDE SERPL-SCNC: 104 MMOL/L — SIGNIFICANT CHANGE UP (ref 96–108)
CO2 SERPL-SCNC: 23 MMOL/L — SIGNIFICANT CHANGE UP (ref 22–31)
CREAT SERPL-MCNC: 0.35 MG/DL — LOW (ref 0.5–1.3)
EOSINOPHIL # BLD AUTO: 0.19 K/UL — SIGNIFICANT CHANGE UP (ref 0–0.5)
EOSINOPHIL NFR BLD AUTO: 1.2 % — SIGNIFICANT CHANGE UP (ref 0–6)
GLUCOSE SERPL-MCNC: 67 MG/DL — LOW (ref 70–99)
HCT VFR BLD CALC: 30.9 % — LOW (ref 34.5–45)
HGB BLD-MCNC: 10.7 G/DL — LOW (ref 11.5–15.5)
IMM GRANULOCYTES NFR BLD AUTO: 0.8 % — SIGNIFICANT CHANGE UP (ref 0–1.5)
LIDOCAIN IGE QN: 192 U/L — SIGNIFICANT CHANGE UP (ref 73–393)
LYMPHOCYTES # BLD AUTO: 1.13 K/UL — SIGNIFICANT CHANGE UP (ref 1–3.3)
LYMPHOCYTES # BLD AUTO: 7.3 % — LOW (ref 13–44)
MCHC RBC-ENTMCNC: 29.3 PG — SIGNIFICANT CHANGE UP (ref 27–34)
MCHC RBC-ENTMCNC: 34.6 GM/DL — SIGNIFICANT CHANGE UP (ref 32–36)
MCV RBC AUTO: 84.7 FL — SIGNIFICANT CHANGE UP (ref 80–100)
MONOCYTES # BLD AUTO: 1.39 K/UL — HIGH (ref 0–0.9)
MONOCYTES NFR BLD AUTO: 8.9 % — SIGNIFICANT CHANGE UP (ref 2–14)
NEUTROPHILS # BLD AUTO: 12.69 K/UL — HIGH (ref 1.8–7.4)
NEUTROPHILS NFR BLD AUTO: 81.5 % — HIGH (ref 43–77)
NRBC # BLD: 0 /100 WBCS — SIGNIFICANT CHANGE UP (ref 0–0)
PLATELET # BLD AUTO: 317 K/UL — SIGNIFICANT CHANGE UP (ref 150–400)
POTASSIUM SERPL-MCNC: 3.7 MMOL/L — SIGNIFICANT CHANGE UP (ref 3.5–5.3)
POTASSIUM SERPL-SCNC: 3.7 MMOL/L — SIGNIFICANT CHANGE UP (ref 3.5–5.3)
PROT SERPL-MCNC: 5.2 G/DL — LOW (ref 6–8.3)
RBC # BLD: 3.65 M/UL — LOW (ref 3.8–5.2)
RBC # FLD: 14 % — SIGNIFICANT CHANGE UP (ref 10.3–14.5)
SODIUM SERPL-SCNC: 136 MMOL/L — SIGNIFICANT CHANGE UP (ref 135–145)
WBC # BLD: 15.58 K/UL — HIGH (ref 3.8–10.5)
WBC # FLD AUTO: 15.58 K/UL — HIGH (ref 3.8–10.5)

## 2020-11-13 PROCEDURE — 99233 SBSQ HOSP IP/OBS HIGH 50: CPT

## 2020-11-13 PROCEDURE — 99232 SBSQ HOSP IP/OBS MODERATE 35: CPT

## 2020-11-13 RX ORDER — BACITRACIN ZINC 500 UNIT/G
1 OINTMENT IN PACKET (EA) TOPICAL ONCE
Refills: 0 | Status: COMPLETED | OUTPATIENT
Start: 2020-11-13 | End: 2020-11-13

## 2020-11-13 RX ORDER — SODIUM CHLORIDE 9 MG/ML
1000 INJECTION INTRAMUSCULAR; INTRAVENOUS; SUBCUTANEOUS ONCE
Refills: 0 | Status: COMPLETED | OUTPATIENT
Start: 2020-11-13 | End: 2020-11-13

## 2020-11-13 RX ORDER — METHYLNALTREXONE BROMIDE 12 MG/.6ML
12 INJECTION, SOLUTION SUBCUTANEOUS ONCE
Refills: 0 | Status: COMPLETED | OUTPATIENT
Start: 2020-11-13 | End: 2020-11-13

## 2020-11-13 RX ADMIN — HYDROMORPHONE HYDROCHLORIDE 0.5 MILLIGRAM(S): 2 INJECTION INTRAMUSCULAR; INTRAVENOUS; SUBCUTANEOUS at 11:50

## 2020-11-13 RX ADMIN — Medication 1 APPLICATION(S): at 03:14

## 2020-11-13 RX ADMIN — PANTOPRAZOLE SODIUM 40 MILLIGRAM(S): 20 TABLET, DELAYED RELEASE ORAL at 11:40

## 2020-11-13 RX ADMIN — HYDROMORPHONE HYDROCHLORIDE 0.5 MILLIGRAM(S): 2 INJECTION INTRAMUSCULAR; INTRAVENOUS; SUBCUTANEOUS at 16:10

## 2020-11-13 RX ADMIN — HYDROMORPHONE HYDROCHLORIDE 0.5 MILLIGRAM(S): 2 INJECTION INTRAMUSCULAR; INTRAVENOUS; SUBCUTANEOUS at 01:53

## 2020-11-13 RX ADMIN — HYDROMORPHONE HYDROCHLORIDE 0.5 MILLIGRAM(S): 2 INJECTION INTRAMUSCULAR; INTRAVENOUS; SUBCUTANEOUS at 06:09

## 2020-11-13 RX ADMIN — HYDROMORPHONE HYDROCHLORIDE 0.5 MILLIGRAM(S): 2 INJECTION INTRAMUSCULAR; INTRAVENOUS; SUBCUTANEOUS at 20:17

## 2020-11-13 RX ADMIN — HYDROMORPHONE HYDROCHLORIDE 0.5 MILLIGRAM(S): 2 INJECTION INTRAMUSCULAR; INTRAVENOUS; SUBCUTANEOUS at 11:31

## 2020-11-13 RX ADMIN — HYDROMORPHONE HYDROCHLORIDE 0.5 MILLIGRAM(S): 2 INJECTION INTRAMUSCULAR; INTRAVENOUS; SUBCUTANEOUS at 15:58

## 2020-11-13 RX ADMIN — HYDROMORPHONE HYDROCHLORIDE 0.5 MILLIGRAM(S): 2 INJECTION INTRAMUSCULAR; INTRAVENOUS; SUBCUTANEOUS at 01:38

## 2020-11-13 RX ADMIN — SODIUM CHLORIDE 1000 MILLILITER(S): 9 INJECTION INTRAMUSCULAR; INTRAVENOUS; SUBCUTANEOUS at 14:48

## 2020-11-13 RX ADMIN — HYDROMORPHONE HYDROCHLORIDE 0.5 MILLIGRAM(S): 2 INJECTION INTRAMUSCULAR; INTRAVENOUS; SUBCUTANEOUS at 05:54

## 2020-11-13 RX ADMIN — HYDROMORPHONE HYDROCHLORIDE 0.5 MILLIGRAM(S): 2 INJECTION INTRAMUSCULAR; INTRAVENOUS; SUBCUTANEOUS at 20:32

## 2020-11-13 RX ADMIN — METHYLNALTREXONE BROMIDE 12 MILLIGRAM(S): 12 INJECTION, SOLUTION SUBCUTANEOUS at 11:41

## 2020-11-13 NOTE — PROGRESS NOTE ADULT - SUBJECTIVE AND OBJECTIVE BOX
SUBJECTIVE AND OBJECTIVE:  INTERVAL HPI/OVERNIGHT EVENTS:    DNR on chart:   Allergies    cephalosporins (Rash (Moderate))  Cipro (Rash (Moderate))    Intolerances    MEDICATIONS  (STANDING):  enoxaparin Injectable 40 milliGRAM(s) SubCutaneous daily  pantoprazole  Injectable 40 milliGRAM(s) IV Push daily  sodium chloride 0.9% Bolus 1000 milliLiter(s) IV Bolus once  sodium chloride 0.9%. 1000 milliLiter(s) (150 mL/Hr) IV Continuous <Continuous>    MEDICATIONS  (PRN):  HYDROmorphone  Injectable 0.5 milliGRAM(s) IV Push every 4 hours PRN Severe Pain (7 - 10)  HYDROmorphone  Injectable 0.2 milliGRAM(s) IV Push every 4 hours PRN Moderate Pain (4 - 6)  ondansetron Injectable 4 milliGRAM(s) IV Push every 4 hours PRN Nausea and/or Vomiting      ITEMS UNCHECKED ARE NOT PRESENT    PRESENT SYMPTOMS: [ ]Unable to obtain due to poor mentation   Source if other than patient:  [ ]Family   [ ]Team     Pain:  [ ]yes [ ]no  QOL impact -   Location -                    Aggravating factors -  Quality -  Radiation -  Timing-  Severity (0-10 scale):  Minimal acceptable level (0-10 scale):     Dyspnea:                           [ ]Mild [ ]Moderate [ ]Severe  Anxiety:                             [ ]Mild [ ]Moderate [ ]Severe  Fatigue:                             [ ]Mild [ ]Moderate [ ]Severe  Nausea:                             [ ]Mild [ ]Moderate [ ]Severe  Loss of appetite:              [ ]Mild [ ]Moderate [ ]Severe  Constipation:                    [ ]Mild [ ]Moderate [ ]Severe    CPOT:    https://www.[facility].org/getattachment/[email or web address]/Critical-Care-Pain-Observation-Tool-(CPOT)    PAIN AD Score:	  http://geriatrictoolkit.[place].edu/cog/painad.pdf (Ctrl + left click to view)    Other Symptoms:  [ ]All other review of systems negative     Palliative Performance Status Version 2:         %      http://npcrc.org/files/news/palliative_performance_scale_ppsv2.pdf  PHYSICAL EXAM:  Vital Signs Last 24 Hrs  T(C): 37.4 (13 Nov 2020 12:44), Max: 37.4 (13 Nov 2020 12:44)  T(F): 99.4 (13 Nov 2020 12:44), Max: 99.4 (13 Nov 2020 12:44)  HR: 111 (13 Nov 2020 12:44) (105 - 111)  BP: 99/67 (13 Nov 2020 12:44) (99/67 - 108/71)  BP(mean): --  RR: 17 (13 Nov 2020 12:44) (17 - 18)  SpO2: 97% (13 Nov 2020 12:44) (91% - 97%) I&O's Summary    12 Nov 2020 07:01  -  13 Nov 2020 07:00  --------------------------------------------------------  IN: 2400.9 mL / OUT: 0 mL / NET: 2400.9 mL       GENERAL:  [ ]Alert  [ ]Oriented x   [ ]Lethargic  [ ]Cachexia  [ ]Unarousable  [ ]Verbal  [ ]Non-Verbal  Behavioral:   [ ]Anxiety  [ ]Delirium [ ]Agitation [ ]Other  HEENT:  [ ]Normal   [ ]Dry mouth   [ ]ET Tube/Trach  [ ]Oral lesions  PULMONARY:   [ ]Clear [ ]Tachypnea  [ ]Audible excessive secretions   [ ]Rhonchi        [ ]Right [ ]Left [ ]Bilateral  [ ]Crackles        [ ]Right [ ]Left [ ]Bilateral  [ ]Wheezing     [ ]Right [ ]Left [ ]Bilateral  [ ]Diminished BS [ ] Right [ ]Left [ ]Bilateral  CARDIOVASCULAR:    [ ]Regular [ ]Irregular [ ]Tachy  [ ]Phi [ ]Murmur [ ]Other  GASTROINTESTINAL:  [ ]Soft  [ ]Distended   [ ]+BS  [ ]Non tender [ ]Tender  [ ]PEG [ ]OGT/ NGT   Last BM:      GENITOURINARY:  [ ]Normal [ ]Incontinent   [ ]Oliguria/Anuria   [ ]Fraga  MUSCULOSKELETAL:   [ ]Normal   [ ]Weakness  [ ]Bed/Wheelchair bound [ ]Edema  NEUROLOGIC:   [ ]No focal deficits  [ ] Cognitive impairment  [ ] Dysphagia [ ]Dysarthria [ ] Paresis [ ]Other   SKIN:   [ ]Normal  [ ]Rash   [ ]Pressure ulcer(s) [ ]y [ ]n present on admission    CRITICAL CARE:  [ ]Shock Present  [ ]Septic [ ]Cardiogenic [ ]Neurologic [ ]Hypovolemic  [ ]Vasopressors [ ]Inotropes  [ ]Respiratory failure present [ ]Mechanical Ventilation [ ]Non-invasive ventilatory support [ ]High-Flow   [ ]Acute  [ ]Chronic [ ]Hypoxic  [ ]Hypercarbic [ ]Other  [ ]Other organ failure     LABS:                        10.7   15.58 )-----------( 317      ( 13 Nov 2020 08:11 )             30.9   11-13    136  |  104  |  5<L>  ----------------------------<  67<L>  3.7   |  23  |  0.35<L>    Ca    8.3<L>      13 Nov 2020 08:11    TPro  5.2<L>  /  Alb  1.7<L>  /  TBili  0.8  /  DBili  x   /  AST  26  /  ALT  71  /  AlkPhos  172<H>  11-13        RADIOLOGY & ADDITIONAL STUDIES:    Protein Calorie Malnutrition Present: [ ]mild [ ]moderate [ ]severe [ ]underweight [ ]morbid obesity  https://www.andeal.org/vault/2440/web/files/ONC/Table_Clinical%20Characteristics%20to%20Document%20Malnutrition-White%20JV%20et%20al%092530.pdf    Height (cm): 157.5 (11-09-20 @ 15:01)  Weight (kg): 79.4 (11-09-20 @ 12:39)  BMI (kg/m2): 32 (11-09-20 @ 15:01)    [ ]PPSV2 < or = 30%  [ ]significant weight loss [ ]poor nutritional intake [ ]anasarca   Albumin, Serum: 1.7 g/dL (11-13-20 @ 08:11)   [ ]Artificial Nutrition    REFERRALS:   [ ]Chaplaincy  [ ]Hospice  [ ]Child Life  [ ]Social Work  [ ]Case management [ ]Holistic Therapy     Goals of Care Document:     SUBJECTIVE AND OBJECTIVE/INTERVAL HPI/OVERNIGHT EVENTS:  - no acute events overnight  - on clears. Abd pain improving.     DNR on chart:   Allergies    cephalosporins (Rash (Moderate))  Cipro (Rash (Moderate))    Intolerances    MEDICATIONS  (STANDING):  enoxaparin Injectable 40 milliGRAM(s) SubCutaneous daily  pantoprazole  Injectable 40 milliGRAM(s) IV Push daily  sodium chloride 0.9% Bolus 1000 milliLiter(s) IV Bolus once  sodium chloride 0.9%. 1000 milliLiter(s) (150 mL/Hr) IV Continuous <Continuous>    MEDICATIONS  (PRN):  HYDROmorphone  Injectable 0.5 milliGRAM(s) IV Push every 4 hours PRN Severe Pain (7 - 10)  HYDROmorphone  Injectable 0.2 milliGRAM(s) IV Push every 4 hours PRN Moderate Pain (4 - 6)  ondansetron Injectable 4 milliGRAM(s) IV Push every 4 hours PRN Nausea and/or Vomiting      ITEMS UNCHECKED ARE NOT PRESENT    PRESENT SYMPTOMS: [ ]Unable to obtain due to poor mentation   Source if other than patient:  [ ]Family   [ ]Team     Pain:  [x ]yes [ ]no  QOL impact - yes  Location -     epigastric               Aggravating factors - food  Quality - sharp  Radiation - no  Timing- constant  Severity (0-10 scale): 5/10  Minimal acceptable level (0-10 scale): 5/10    Dyspnea:                           [ ]Mild [ ]Moderate [ ]Severe  Anxiety:                             [ ]Mild [ ]Moderate [ ]Severe  Fatigue:                             [ ]Mild [ ]Moderate [ ]Severe  Nausea:                             [ ]Mild [ ]Moderate [ ]Severe  Loss of appetite:              [ ]Mild [ ]Moderate [ ]Severe  Constipation:                    [ ]Mild [ ]Moderate [ ]Severe    CPOT:    https://www.[facility].org/getattachment/[email or web address]/Critical-Care-Pain-Observation-Tool-(CPOT)    PAIN AD Score:	  http://geriatrictoolkit.[place].[facility]/cog/painad.pdf (Ctrl + left click to view)    Other Symptoms:  [x ]All other review of systems negative     Palliative Performance Status Version 2:     80    %      http://npcrc.org/files/news/palliative_performance_scale_ppsv2.pdf  PHYSICAL EXAM:  Vital Signs Last 24 Hrs  T(C): 37.4 (13 Nov 2020 12:44), Max: 37.4 (13 Nov 2020 12:44)  T(F): 99.4 (13 Nov 2020 12:44), Max: 99.4 (13 Nov 2020 12:44)  HR: 111 (13 Nov 2020 12:44) (105 - 111)  BP: 99/67 (13 Nov 2020 12:44) (99/67 - 108/71)  BP(mean): --  RR: 17 (13 Nov 2020 12:44) (17 - 18)  SpO2: 97% (13 Nov 2020 12:44) (91% - 97%) I&O's Summary    12 Nov 2020 07:01  -  13 Nov 2020 07:00  --------------------------------------------------------  IN: 2400.9 mL / OUT: 0 mL / NET: 2400.9 mL    GENERAL: NAD, obese  HEENT:  anicteric, moist mucous membranes  CHEST/LUNG:  CTA b/l, no rales, wheezes, or rhonchi  HEART:  RRR, S1, S2  ABDOMEN:  BS+, soft, mildly tender to palp RUQ, nondistended  EXTREMITIES: no edema, cyanosis, or calf tenderness  NERVOUS SYSTEM: answers questions and follows commands appropriately  Psych: normal affect    LABS:                        10.7   15.58 )-----------( 317      ( 13 Nov 2020 08:11 )             30.9   11-13    136  |  104  |  5<L>  ----------------------------<  67<L>  3.7   |  23  |  0.35<L>    Ca    8.3<L>      13 Nov 2020 08:11    TPro  5.2<L>  /  Alb  1.7<L>  /  TBili  0.8  /  DBili  x   /  AST  26  /  ALT  71  /  AlkPhos  172<H>  11-13        RADIOLOGY & ADDITIONAL STUDIES: reviewed    Protein Calorie Malnutrition Present: [ ]mild [ ]moderate [ ]severe [ ]underweight [ ]morbid obesity  https://www.andeal.org/vault/2440/web/files/ONC/Table_Clinical%20Characteristics%20to%20Document%20Malnutrition-White%20JV%20et%20al%202012.pdf    Height (cm): 157.5 (11-09-20 @ 15:01)  Weight (kg): 79.4 (11-09-20 @ 12:39)  BMI (kg/m2): 32 (11-09-20 @ 15:01)    [ ]PPSV2 < or = 30%  [ ]significant weight loss [ ]poor nutritional intake [ ]anasarca   Albumin, Serum: 1.7 g/dL (11-13-20 @ 08:11)   [ ]Artificial Nutrition    REFERRALS:   [ ]Chaplaincy  [ ]Hospice  [ ]Child Life  [ ]Social Work  [ ]Case management [ ]Holistic Therapy     Goals of Care Document:

## 2020-11-13 NOTE — PROGRESS NOTE ADULT - PROBLEM SELECTOR PLAN 1
post ercp pancreatitis  slowly improving  cta chest reviewed w radiology  ivf, advance to clears  give relistor x1  ppi, anti emetics  pain management  oob as tolerated  will follow   repeat ercp with stent remoival as outpt

## 2020-11-13 NOTE — PROGRESS NOTE ADULT - ASSESSMENT
24 YO F with PMHX cholestasis of pregnancy (2 years ago), cholecystitis s/p cholecystectomy (POD#4 at [place]), presented with abd pain and N/V postoperatively, admitted for 5mm retained CBD stone, now presenting with pancreatitis s/p ERCP on 11/9.    1) Intractable abdominal pain 2/2 Pancreatitis & retained CBD stone s/p ERCP  2) Anxiety related to medical condition/hospitalization and pain  - improving  - lipase downtrended  - on clears  - GI & surgery f/u  - continue dilaudid 0.2mg q4h prn moderate pain and 0.5 mg IVP q4h prn severe pain > plan to switch to PO route tomorrow  - continue PPI  - anticipatory guidance and education regarding pain provided    2) Constipation 2/2 opioid use  - senna 2 tabs PO qHS  - miralax daily  - bisacodyl supp. prn  - s/p fleet enema 11/11 with small BM afterwards    DIscussed with the primary team. Will continue to follow.    Giuliana Ingram MD

## 2020-11-13 NOTE — PROGRESS NOTE ADULT - SUBJECTIVE AND OBJECTIVE BOX
INTERVAL HPI/OVERNIGHT EVENTS:  No new overnight event.  No N/V/D.  Tolerating diet.  some pain off the drip moving around    Allergies    cephalosporins (Rash (Moderate))  Cipro (Rash (Moderate))    Intolerances          General:  No wt loss, fevers, chills, night sweats, fatigue,   Eyes:  Good vision, no reported pain  ENT:  No sore throat, pain, runny nose, dysphagia  CV:  No pain, palpitations, hypo/hypertension  Resp:  No dyspnea, cough, tachypnea, wheezing  GI:  No pain, No nausea, No vomiting, No diarrhea, No constipation, No weight loss, No fever, No pruritis, No rectal bleeding, No tarry stools, No dysphagia,  :  No pain, bleeding, incontinence, nocturia  Muscle:  No pain, weakness  Neuro:  No weakness, tingling, memory problems  Psych:  No fatigue, insomnia, mood problems, depression  Endocrine:  No polyuria, polydipsia, cold/heat intolerance  Heme:  No petechiae, ecchymosis, easy bruisability  Skin:  No rash, tattoos, scars, edema      PHYSICAL EXAM:   Vital Signs:  Vital Signs Last 24 Hrs  T(C): 37 (2020 05:15), Max: 37.3 (2020 20:43)  T(F): 98.6 (2020 05:15), Max: 99.1 (2020 20:43)  HR: 105 (2020 05:15) (105 - 125)  BP: 108/71 (2020 05:15) (105/70 - 111/75)  BP(mean): --  RR: 18 (2020 05:15) (18 - 18)  SpO2: 91% (2020 05:15) (91% - 94%)  Daily     Daily I&O's Summary    2020 07:01  -  2020 07:00  --------------------------------------------------------  IN: 2400.9 mL / OUT: 0 mL / NET: 2400.9 mL        GENERAL:  Appears stated age, well-groomed, well-nourished, no distress  HEENT:  NC/AT,  conjunctivae clear and pink, no thyromegaly, nodules, adenopathy, no JVD, sclera -anicteric  CHEST:  Full & symmetric excursion, no increased effort, breath sounds clear  HEART:  Regular rhythm, S1, S2, no murmur/rub/S3/S4, no abdominal bruit, no edema  ABDOMEN:  Soft, non-tender, non-distended, normoactive bowel sounds,  no masses ,no hepato-splenomegaly, no signs of chronic liver disease  EXTEREMITIES:  no cyanosis,clubbing or edema  SKIN:  No rash/erythema/ecchymoses/petechiae/wounds/abscess/warm/dry  NEURO:  Alert, oriented, no asterixis, no tremor, no encephalopathy      LABS:                        12.8   14.83 )-----------( 331      ( 2020 08:50 )             38.2         137  |  104  |  8   ----------------------------<  72  4.0   |  24  |  0.60    Ca    8.1<L>      2020 08:50    TPro  5.6<L>  /  Alb  1.9<L>  /  TBili  0.8  /  DBili  x   /  AST  20  /  ALT  98<H>  /  AlkPhos  213<H>        Urinalysis Basic - ( 2020 08:38 )    Color: Yellow / Appearance: Slightly Turbid / S.015 / pH: x  Gluc: x / Ketone: Small  / Bili: Negative / Urobili: Negative   Blood: x / Protein: 15 / Nitrite: Negative   Leuk Esterase: Negative / RBC: x / WBC 0-2   Sq Epi: x / Non Sq Epi: Few / Bacteria: Few      amylase   lipaseLipase, Serum: 1270 U/L ( @ 08:50)  Lipase, Serum: 5926 U/L ( @ 09:18)  Lipase, Serum: 22338 U/L (11-10 @ 08:47)  Lipase, Serum: 92476 U/L (11-10 @ 02:31)    RADIOLOGY & ADDITIONAL TESTS:

## 2020-11-13 NOTE — PROGRESS NOTE ADULT - SUBJECTIVE AND OBJECTIVE BOX
CC/F/U for:     HPI:  24 YO F with PMHX cholestasis of pregnancy (2 years ago), cholecystitis s/p cholecystectomy (POD#2) presenting to the ER with acute RUQ pain since 1700. Pt states she had RUQ pain associated with uncontrolled vomiting x 1 month, underwent cholecystectomy 2 days ago at [facility]. Pt's RUQ pain did not subside after surgery, was associated with b/l flank pain, pain acutely worsened at 1700 last night, was on and off, sharp. Associated with non bilious non bloody emesis multiple times until pt began dry heaving. Pt took oxy 5 mg at approximately 1800 however vomited it as well. Pt admits to feeling very dehydrated, has not been able to keep down water, admits to associated lightheadedness. Pt states she also feels constipated, last BM 4 days ago, pt is passing gas. Last oral intake was a banana and toast which she vomited. Pt admits to associated chills. Pt denies sick contacts, sore throat, ear pain, muscle aches, CP SOB, cough. Of note, pt states she has a baseline BP ~110/60.     ED vitals afebrile, tachycardic , 86/62 after 1L IVF bolus BP increased to 121/65.   Labs significant for K 3.3, Bili 3.4, Alk P 356, , . US GB shows a 5 mm calculus in the region of the proximal common bile duct/cystic duct remnant which may represent retained stone.  Pt received 1L IVF bolus, Dilaudid .5 mg x 3, zofran 4 mg IV x1.    (08 Nov 2020 03:16)        INTERVAL HPI/OVERNIGHT EVENTS:  Pt seen and examined at bedside.     Allergies/Intolerance: cephalosporins (Rash (Moderate))  Cipro (Rash (Moderate))      MEDICATIONS  (STANDING):  enoxaparin Injectable 40 milliGRAM(s) SubCutaneous daily  methylnaltrexone Injectable 12 milliGRAM(s) SubCutaneous once  pantoprazole  Injectable 40 milliGRAM(s) IV Push daily  sodium chloride 0.9%. 1000 milliLiter(s) (150 mL/Hr) IV Continuous <Continuous>    MEDICATIONS  (PRN):  HYDROmorphone  Injectable 0.5 milliGRAM(s) IV Push every 4 hours PRN Severe Pain (7 - 10)  HYDROmorphone  Injectable 0.2 milliGRAM(s) IV Push every 4 hours PRN Moderate Pain (4 - 6)  ondansetron Injectable 4 milliGRAM(s) IV Push every 4 hours PRN Nausea and/or Vomiting        ROS: as above; all other systems reviewed and wnl      PHYSICAL EXAMINATION:  Vital Signs Last 24 Hrs  T(C): 37 (13 Nov 2020 05:15), Max: 37.3 (12 Nov 2020 20:43)  T(F): 98.6 (13 Nov 2020 05:15), Max: 99.1 (12 Nov 2020 20:43)  HR: 105 (13 Nov 2020 05:15) (105 - 125)  BP: 108/71 (13 Nov 2020 05:15) (105/70 - 111/75)  BP(mean): --  RR: 18 (13 Nov 2020 05:15) (18 - 18)  SpO2: 91% (13 Nov 2020 05:15) (91% - 94%)  CAPILLARY BLOOD GLUCOSE          GENERAL: NAD  HEENT: mucous membranes dry  RESP: respirations unlabored  HEART: HR s  ABDOMEN: soft, ND, NT   EXTREMITIES:  no edema b/l LEs, no calf tenderness  NEURO: awake, alert, interactive; moves all extremities      LABS:                        10.7   15.58 )-----------( 317      ( 13 Nov 2020 08:11 )             30.9     11-13    136  |  104  |  5<L>  ----------------------------<  67<L>  3.7   |  23  |  0.35<L>    Ca    8.3<L>      13 Nov 2020 08:11    TPro  5.2<L>  /  Alb  1.7<L>  /  TBili  0.8  /  DBili  x   /  AST  26  /  ALT  71  /  AlkPhos  172<H>  11-13            RADIOLOGY & ADDITIONAL TESTS:      ASSESSMENT AND PLAN:  25y Female CC/F/U for: cholecystitis, pancreatitis    HPI:  26 YO F with PMHX cholestasis of pregnancy (2 years ago), cholecystitis s/p cholecystectomy (POD#2) presenting to the ER with acute RUQ pain since 1700. Pt states she had RUQ pain associated with uncontrolled vomiting x 1 month, underwent cholecystectomy 2 days ago at [facility]. Pt's RUQ pain did not subside after surgery, was associated with b/l flank pain, pain acutely worsened at 1700 last night, was on and off, sharp. Associated with non bilious non bloody emesis multiple times until pt began dry heaving. Pt took oxy 5 mg at approximately 1800 however vomited it as well. Pt admits to feeling very dehydrated, has not been able to keep down water, admits to associated lightheadedness. Pt states she also feels constipated, last BM 4 days ago, pt is passing gas. Last oral intake was a banana and toast which she vomited. Pt admits to associated chills. Pt denies sick contacts, sore throat, ear pain, muscle aches, CP SOB, cough. Of note, pt states she has a baseline BP ~110/60.     ED vitals afebrile, tachycardic , 86/62 after 1L IVF bolus BP increased to 121/65.   Labs significant for K 3.3, Bili 3.4, Alk P 356, , . US GB shows a 5 mm calculus in the region of the proximal common bile duct/cystic duct remnant which may represent retained stone.  Pt received 1L IVF bolus, Dilaudid .5 mg x 3, zofran 4 mg IV x1.    (08 Nov 2020 03:16)      INTERVAL HPI/OVERNIGHT EVENTS:  Pt seen and examined at bedside - still w/abd pain, but tolerating clear liquid diet.     Allergies/Intolerance: cephalosporins (Rash (Moderate))  Cipro (Rash (Moderate))      MEDICATIONS  (STANDING):  enoxaparin Injectable 40 milliGRAM(s) SubCutaneous daily  methylnaltrexone Injectable 12 milliGRAM(s) SubCutaneous once  pantoprazole  Injectable 40 milliGRAM(s) IV Push daily  sodium chloride 0.9%. 1000 milliLiter(s) (150 mL/Hr) IV Continuous <Continuous>    MEDICATIONS  (PRN):  HYDROmorphone  Injectable 0.5 milliGRAM(s) IV Push every 4 hours PRN Severe Pain (7 - 10)  HYDROmorphone  Injectable 0.2 milliGRAM(s) IV Push every 4 hours PRN Moderate Pain (4 - 6)  ondansetron Injectable 4 milliGRAM(s) IV Push every 4 hours PRN Nausea and/or Vomiting      ROS: as above; all other systems reviewed and wnl      PHYSICAL EXAMINATION:  Vital Signs Last 24 Hrs  T(C): 37 (13 Nov 2020 05:15), Max: 37.3 (12 Nov 2020 20:43)  T(F): 98.6 (13 Nov 2020 05:15), Max: 99.1 (12 Nov 2020 20:43)  HR: 105 (13 Nov 2020 05:15) (105 - 125)  BP: 108/71 (13 Nov 2020 05:15) (105/70 - 111/75)  RR: 18 (13 Nov 2020 05:15) (18 - 18)  SpO2: 91% (13 Nov 2020 05:15) (91% - 94%)    GENERAL: obese, young female, NAD  HEENT: mucous membranes dry  RESP: respirations unlabored  HEART: tachycardic; HR 110s  ABDOMEN: soft, ND, mildly ttp diffusely; no rebound  EXTREMITIES: no edema b/l LEs, no calf tenderness  NEURO: awake, alert, interactive; moves all extremities      LABS:                        10.7   15.58 )-----------( 317      ( 13 Nov 2020 08:11 )             30.9     11-13    136  |  104  |  5<L>  ----------------------------<  67<L>  3.7   |  23  |  0.35<L>    Ca    8.3<L>      13 Nov 2020 08:11    TPro  5.2<L>  /  Alb  1.7<L>  /  TBili  0.8  /  DBili  x   /  AST  26  /  ALT  71  /  AlkPhos  172<H>  11-13

## 2020-11-14 LAB
ALBUMIN SERPL ELPH-MCNC: 2.1 G/DL — LOW (ref 3.3–5)
ALP SERPL-CCNC: 318 U/L — HIGH (ref 40–120)
ALT FLD-CCNC: 77 U/L — SIGNIFICANT CHANGE UP (ref 12–78)
ANION GAP SERPL CALC-SCNC: 11 MMOL/L — SIGNIFICANT CHANGE UP (ref 5–17)
AST SERPL-CCNC: 35 U/L — SIGNIFICANT CHANGE UP (ref 15–37)
BASOPHILS # BLD AUTO: 0 K/UL — SIGNIFICANT CHANGE UP (ref 0–0.2)
BASOPHILS NFR BLD AUTO: 0 % — SIGNIFICANT CHANGE UP (ref 0–2)
BILIRUB SERPL-MCNC: 1 MG/DL — SIGNIFICANT CHANGE UP (ref 0.2–1.2)
BUN SERPL-MCNC: 3 MG/DL — LOW (ref 7–23)
CALCIUM SERPL-MCNC: 8.8 MG/DL — SIGNIFICANT CHANGE UP (ref 8.5–10.1)
CHLORIDE SERPL-SCNC: 101 MMOL/L — SIGNIFICANT CHANGE UP (ref 96–108)
CO2 SERPL-SCNC: 25 MMOL/L — SIGNIFICANT CHANGE UP (ref 22–31)
CREAT SERPL-MCNC: 0.49 MG/DL — LOW (ref 0.5–1.3)
EOSINOPHIL # BLD AUTO: 0 K/UL — SIGNIFICANT CHANGE UP (ref 0–0.5)
EOSINOPHIL NFR BLD AUTO: 0 % — SIGNIFICANT CHANGE UP (ref 0–6)
GLUCOSE SERPL-MCNC: 73 MG/DL — SIGNIFICANT CHANGE UP (ref 70–99)
HCT VFR BLD CALC: 30.5 % — LOW (ref 34.5–45)
HCT VFR BLD CALC: 35.7 % — SIGNIFICANT CHANGE UP (ref 34.5–45)
HGB BLD-MCNC: 10.5 G/DL — LOW (ref 11.5–15.5)
HGB BLD-MCNC: 12 G/DL — SIGNIFICANT CHANGE UP (ref 11.5–15.5)
LIDOCAIN IGE QN: 192 U/L — SIGNIFICANT CHANGE UP (ref 73–393)
LYMPHOCYTES # BLD AUTO: 1.16 K/UL — SIGNIFICANT CHANGE UP (ref 1–3.3)
LYMPHOCYTES # BLD AUTO: 7 % — LOW (ref 13–44)
MCHC RBC-ENTMCNC: 28.4 PG — SIGNIFICANT CHANGE UP (ref 27–34)
MCHC RBC-ENTMCNC: 28.6 PG — SIGNIFICANT CHANGE UP (ref 27–34)
MCHC RBC-ENTMCNC: 33.6 GM/DL — SIGNIFICANT CHANGE UP (ref 32–36)
MCHC RBC-ENTMCNC: 34.4 GM/DL — SIGNIFICANT CHANGE UP (ref 32–36)
MCV RBC AUTO: 83.1 FL — SIGNIFICANT CHANGE UP (ref 80–100)
MCV RBC AUTO: 84.6 FL — SIGNIFICANT CHANGE UP (ref 80–100)
MONOCYTES # BLD AUTO: 1.49 K/UL — HIGH (ref 0–0.9)
MONOCYTES NFR BLD AUTO: 9 % — SIGNIFICANT CHANGE UP (ref 2–14)
NEUTROPHILS # BLD AUTO: 13.89 K/UL — HIGH (ref 1.8–7.4)
NEUTROPHILS NFR BLD AUTO: 82 % — HIGH (ref 43–77)
NRBC # BLD: 0 /100 WBCS — SIGNIFICANT CHANGE UP (ref 0–0)
NRBC # BLD: SIGNIFICANT CHANGE UP /100 WBCS (ref 0–0)
PLATELET # BLD AUTO: 372 K/UL — SIGNIFICANT CHANGE UP (ref 150–400)
PLATELET # BLD AUTO: 422 K/UL — HIGH (ref 150–400)
POTASSIUM SERPL-MCNC: 2.8 MMOL/L — CRITICAL LOW (ref 3.5–5.3)
POTASSIUM SERPL-MCNC: 3 MMOL/L — LOW (ref 3.5–5.3)
POTASSIUM SERPL-SCNC: 2.8 MMOL/L — CRITICAL LOW (ref 3.5–5.3)
POTASSIUM SERPL-SCNC: 3 MMOL/L — LOW (ref 3.5–5.3)
PROT SERPL-MCNC: 6.7 G/DL — SIGNIFICANT CHANGE UP (ref 6–8.3)
RBC # BLD: 3.67 M/UL — LOW (ref 3.8–5.2)
RBC # BLD: 4.22 M/UL — SIGNIFICANT CHANGE UP (ref 3.8–5.2)
RBC # FLD: 13.8 % — SIGNIFICANT CHANGE UP (ref 10.3–14.5)
RBC # FLD: 14 % — SIGNIFICANT CHANGE UP (ref 10.3–14.5)
SODIUM SERPL-SCNC: 137 MMOL/L — SIGNIFICANT CHANGE UP (ref 135–145)
WBC # BLD: 16.54 K/UL — HIGH (ref 3.8–10.5)
WBC # BLD: 17.24 K/UL — HIGH (ref 3.8–10.5)
WBC # FLD AUTO: 16.54 K/UL — HIGH (ref 3.8–10.5)
WBC # FLD AUTO: 17.24 K/UL — HIGH (ref 3.8–10.5)

## 2020-11-14 PROCEDURE — 71045 X-RAY EXAM CHEST 1 VIEW: CPT | Mod: 26

## 2020-11-14 PROCEDURE — 99233 SBSQ HOSP IP/OBS HIGH 50: CPT

## 2020-11-14 RX ORDER — SODIUM CHLORIDE 9 MG/ML
1000 INJECTION, SOLUTION INTRAVENOUS
Refills: 0 | Status: DISCONTINUED | OUTPATIENT
Start: 2020-11-14 | End: 2020-11-15

## 2020-11-14 RX ORDER — PANTOPRAZOLE SODIUM 20 MG/1
40 TABLET, DELAYED RELEASE ORAL
Refills: 0 | Status: DISCONTINUED | OUTPATIENT
Start: 2020-11-14 | End: 2020-11-17

## 2020-11-14 RX ORDER — POTASSIUM CHLORIDE 20 MEQ
40 PACKET (EA) ORAL EVERY 4 HOURS
Refills: 0 | Status: COMPLETED | OUTPATIENT
Start: 2020-11-14 | End: 2020-11-14

## 2020-11-14 RX ORDER — MULTIVIT WITH MIN/MFOLATE/K2 340-15/3 G
1 POWDER (GRAM) ORAL ONCE
Refills: 0 | Status: COMPLETED | OUTPATIENT
Start: 2020-11-14 | End: 2020-11-15

## 2020-11-14 RX ORDER — ACETAMINOPHEN 500 MG
650 TABLET ORAL ONCE
Refills: 0 | Status: COMPLETED | OUTPATIENT
Start: 2020-11-14 | End: 2020-11-14

## 2020-11-14 RX ADMIN — HYDROMORPHONE HYDROCHLORIDE 0.5 MILLIGRAM(S): 2 INJECTION INTRAMUSCULAR; INTRAVENOUS; SUBCUTANEOUS at 14:34

## 2020-11-14 RX ADMIN — HYDROMORPHONE HYDROCHLORIDE 0.5 MILLIGRAM(S): 2 INJECTION INTRAMUSCULAR; INTRAVENOUS; SUBCUTANEOUS at 08:44

## 2020-11-14 RX ADMIN — ENOXAPARIN SODIUM 40 MILLIGRAM(S): 100 INJECTION SUBCUTANEOUS at 12:14

## 2020-11-14 RX ADMIN — HYDROMORPHONE HYDROCHLORIDE 0.5 MILLIGRAM(S): 2 INJECTION INTRAMUSCULAR; INTRAVENOUS; SUBCUTANEOUS at 09:00

## 2020-11-14 RX ADMIN — HYDROMORPHONE HYDROCHLORIDE 0.5 MILLIGRAM(S): 2 INJECTION INTRAMUSCULAR; INTRAVENOUS; SUBCUTANEOUS at 04:49

## 2020-11-14 RX ADMIN — HYDROMORPHONE HYDROCHLORIDE 0.5 MILLIGRAM(S): 2 INJECTION INTRAMUSCULAR; INTRAVENOUS; SUBCUTANEOUS at 22:34

## 2020-11-14 RX ADMIN — SODIUM CHLORIDE 100 MILLILITER(S): 9 INJECTION, SOLUTION INTRAVENOUS at 16:31

## 2020-11-14 RX ADMIN — Medication 650 MILLIGRAM(S): at 21:11

## 2020-11-14 RX ADMIN — HYDROMORPHONE HYDROCHLORIDE 0.5 MILLIGRAM(S): 2 INJECTION INTRAMUSCULAR; INTRAVENOUS; SUBCUTANEOUS at 04:34

## 2020-11-14 RX ADMIN — Medication 650 MILLIGRAM(S): at 22:00

## 2020-11-14 RX ADMIN — PANTOPRAZOLE SODIUM 40 MILLIGRAM(S): 20 TABLET, DELAYED RELEASE ORAL at 12:14

## 2020-11-14 RX ADMIN — HYDROMORPHONE HYDROCHLORIDE 0.5 MILLIGRAM(S): 2 INJECTION INTRAMUSCULAR; INTRAVENOUS; SUBCUTANEOUS at 00:24

## 2020-11-14 RX ADMIN — HYDROMORPHONE HYDROCHLORIDE 0.5 MILLIGRAM(S): 2 INJECTION INTRAMUSCULAR; INTRAVENOUS; SUBCUTANEOUS at 00:39

## 2020-11-14 RX ADMIN — HYDROMORPHONE HYDROCHLORIDE 0.5 MILLIGRAM(S): 2 INJECTION INTRAMUSCULAR; INTRAVENOUS; SUBCUTANEOUS at 19:15

## 2020-11-14 RX ADMIN — Medication 40 MILLIEQUIVALENT(S): at 12:14

## 2020-11-14 RX ADMIN — Medication 40 MILLIEQUIVALENT(S): at 17:00

## 2020-11-14 RX ADMIN — Medication 40 MILLIEQUIVALENT(S): at 22:35

## 2020-11-14 RX ADMIN — HYDROMORPHONE HYDROCHLORIDE 0.5 MILLIGRAM(S): 2 INJECTION INTRAMUSCULAR; INTRAVENOUS; SUBCUTANEOUS at 23:04

## 2020-11-14 RX ADMIN — HYDROMORPHONE HYDROCHLORIDE 0.5 MILLIGRAM(S): 2 INJECTION INTRAMUSCULAR; INTRAVENOUS; SUBCUTANEOUS at 18:47

## 2020-11-14 RX ADMIN — HYDROMORPHONE HYDROCHLORIDE 0.5 MILLIGRAM(S): 2 INJECTION INTRAMUSCULAR; INTRAVENOUS; SUBCUTANEOUS at 14:50

## 2020-11-14 NOTE — PROGRESS NOTE ADULT - PROBLEM SELECTOR PLAN 1
post ercp pancreatitis  slowly improving  cta chest reviewed w radiology  ivf, advance to clears  give relistor x1  ppi, anti emetics  pain management  oob as tolerated  will follow   repeat ercp with stent removal as outpt post ercp pancreatitis  slowly improving  cta chest reviewed w radiology  ivf, advance to clears  give relistor x1  ppi, anti emetics  pain management  oob as tolerated  will follow   repeat ercp with stent removal as outpt  give mag citrate x1   fleets

## 2020-11-14 NOTE — PROGRESS NOTE ADULT - SUBJECTIVE AND OBJECTIVE BOX
INTERVAL HPI/OVERNIGHT EVENTS:  No new overnight event.  No N/V/D.  Tolerating diet.  LFTs downtrending  endorses abd pain, no BM       Allergies    cephalosporins (Rash (Moderate))  Cipro (Rash (Moderate))    Intolerances          General:  No wt loss, fevers, chills, night sweats, fatigue,   Eyes:  Good vision, no reported pain  ENT:  No sore throat, pain, runny nose, dysphagia  CV:  No pain, palpitations, hypo/hypertension  Resp:  No dyspnea, cough, tachypnea, wheezing  GI:  No pain, No nausea, No vomiting, No diarrhea, No constipation, No weight loss, No fever, No pruritis, No rectal bleeding, No tarry stools, No dysphagia,  :  No pain, bleeding, incontinence, nocturia  Muscle:  No pain, weakness  Neuro:  No weakness, tingling, memory problems  Psych:  No fatigue, insomnia, mood problems, depression  Endocrine:  No polyuria, polydipsia, cold/heat intolerance  Heme:  No petechiae, ecchymosis, easy bruisability  Skin:  No rash, tattoos, scars, edema      PHYSICAL EXAM:   Vital Signs Last 24 Hrs  T(C): 37.2 (14 Nov 2020 06:49), Max: 37.4 (13 Nov 2020 12:44)  T(F): 98.9 (14 Nov 2020 06:49), Max: 99.4 (13 Nov 2020 12:44)  HR: 109 (14 Nov 2020 06:49) (107 - 111)  BP: 111/71 (14 Nov 2020 06:49) (99/67 - 111/71)  BP(mean): --  RR: 18 (14 Nov 2020 06:49) (17 - 18)  SpO2: 93% (14 Nov 2020 06:49) (91% - 97%)  Daily     Daily I&O's Summary    12 Nov 2020 07:01  -  13 Nov 2020 07:00  --------------------------------------------------------  IN: 2400.9 mL / OUT: 0 mL / NET: 2400.9 mL        GENERAL:  Appears stated age, well-groomed, well-nourished, no distress  HEENT:  NC/AT,  conjunctivae clear and pink, no thyromegaly, nodules, adenopathy, no JVD, sclera -anicteric  CHEST:  Full & symmetric excursion, no increased effort, breath sounds clear  HEART:  Regular rhythm, S1, S2, no murmur/rub/S3/S4, no abdominal bruit, no edema  ABDOMEN:  Soft, non-tender, non-distended, normoactive bowel sounds,  no masses ,no hepato-splenomegaly, no signs of chronic liver disease  EXTEREMITIES:  no cyanosis,clubbing or edema  SKIN:  No rash/erythema/ecchymoses/petechiae/wounds/abscess/warm/dry  NEURO:  Alert, oriented, no asterixis, no tremor, no encephalopathy            LABS:                        12.0   17.24 )-----------( 422      ( 14 Nov 2020 09:32 )             35.7     11-13    136  |  104  |  5<L>  ----------------------------<  67<L>  3.7   |  23  |  0.35<L>    Ca    8.3<L>      13 Nov 2020 08:11    TPro  5.2<L>  /  Alb  1.7<L>  /  TBili  0.8  /  DBili  x   /  AST  26  /  ALT  71  /  AlkPhos  172<H>  11-13 11-13-20 @ 07:01  -  11-14-20 @ 07:00  --------------------------------------------------------  IN: 2800 mL / OUT: 0 mL / NET: 2800 mL                Culture - Blood (collected 12 Nov 2020 18:44)  Source: .Blood Blood-Peripheral 2anaerobic  Preliminary Report (13 Nov 2020 19:01):    No growth to date.    Culture - Blood (collected 12 Nov 2020 18:44)  Source: .Blood Blood-Peripheral  Preliminary Report (13 Nov 2020 19:01):    No growth to date.    Culture - Urine (collected 11 Nov 2020 19:10)  Source: .Urine Clean Catch (Midstream)  Final Report (12 Nov 2020 15:13):    <10,000 CFU/mL Normal Urogenital Megan        RADIOLOGY

## 2020-11-14 NOTE — PROGRESS NOTE ADULT - ASSESSMENT
25F with PMH cholestasis of pregnancy (2018), hx cholecystitis/cholecystectomy at [facility] 2 days PTA presents with abd pain, n/v, transaminitis. Found to have 5mm retained CBD stone, s/p ERCP. Course complicated by pancreatitis.      #Choledocholithiasis  #Pancreatitis   -s/p ERCP with stone removal/stent placement. Developed pancreatitis   -Lipase downtrending - within normal limits  -Advance diet to low fiber/low fat  -Continue pain control - transition to oral Dilaudid  -Continue IVF. Oral hydration encouraged  -Blood cultures x2 no growth to date  -GI consulted recommendations appreciated  -Follow up outpatient for stent removal     #Leukocytosis   -Patient nontoxic appearing. Reports improvement in abd pain. Afebrile  -Discuss with ID, regarding re-imaging of abd to r/o underlying infection     #Hypokalemia  -Replete KCl 40mEq x3   -Follow up 4pm KCl   -Continue IVF with potassium supplementation    #Constipation  -Bowel regimen  -GI following    #Prophylactic Measure  DVT prophylaxis: Lovenox

## 2020-11-14 NOTE — PROGRESS NOTE ADULT - SUBJECTIVE AND OBJECTIVE BOX
Patient is a 25y old  Female who presents with a chief complaint of retained CBD stone s/p cholecystectomy (14 Nov 2020 09:53)      INTERVAL HPI/OVERNIGHT EVENTS: Patient seen and examined at bedside. No overnight events. Reports improvement in abd pain, requesting diet advance. Reports intermittent pain "by the pancreas radiating to flanks." Has not had a BM in 7 days.     MEDICATIONS  (STANDING):  enoxaparin Injectable 40 milliGRAM(s) SubCutaneous daily  magnesium citrate Oral Solution 1 Bottle Oral once  pantoprazole  Injectable 40 milliGRAM(s) IV Push daily  potassium chloride    Tablet ER 40 milliEquivalent(s) Oral every 4 hours  saline laxative (FLEET) Rectal Enema 1 Enema Rectal once  sodium chloride 0.9% 1000 milliLiter(s) (100 mL/Hr) IV Continuous <Continuous>    MEDICATIONS  (PRN):  HYDROmorphone  Injectable 0.5 milliGRAM(s) IV Push every 4 hours PRN Severe Pain (7 - 10)  HYDROmorphone  Injectable 0.2 milliGRAM(s) IV Push every 4 hours PRN Moderate Pain (4 - 6)  ondansetron Injectable 4 milliGRAM(s) IV Push every 4 hours PRN Nausea and/or Vomiting      Allergies    cephalosporins (Rash (Moderate))  Cipro (Rash (Moderate))    Intolerances        REVIEW OF SYSTEMS:  CONSTITUTIONAL: No fever or chills  HEENT:  No headache, no sore throat  RESPIRATORY: No cough, wheezing, or shortness of breath  CARDIOVASCULAR: No chest pain, palpitations  GASTROINTESTINAL: mild abd pain, no nausea, vomiting, positive constipation  GENITOURINARY: No dysuria, frequency, or hematuria  NEUROLOGICAL: no focal weakness or dizziness  MUSCULOSKELETAL: no myalgias     Vital Signs Last 24 Hrs  T(C): 37.2 (14 Nov 2020 12:45), Max: 37.2 (14 Nov 2020 06:49)  T(F): 99 (14 Nov 2020 12:45), Max: 99 (14 Nov 2020 12:45)  HR: 102 (14 Nov 2020 12:45) (102 - 109)  BP: 122/55 (14 Nov 2020 12:45) (108/69 - 122/55)  BP(mean): --  RR: 18 (14 Nov 2020 12:45) (18 - 18)  SpO2: 93% (14 Nov 2020 12:45) (91% - 93%)    PHYSICAL EXAM:  GENERAL: NAD, well-developed, well-groomed  HEENT:  anicteric, moist mucous membranes  CHEST/LUNG:  CTA b/l, no rales, wheezes, or rhonchi  HEART:  tachycardiac, S1, S2  ABDOMEN:  BS+, soft, tenderness to palpation in epigastric region, nondistended  EXTREMITIES: no edema, cyanosis, or calf tenderness  NERVOUS SYSTEM: answers questions and follows commands appropriately    LABS:                        12.0   17.24 )-----------( 422      ( 14 Nov 2020 09:32 )             35.7     11-14    137  |  101  |  3   ----------------------------<  73  2.8   |  25  |  0.49    Ca    8.8      14 Nov 2020 09:32    TPro  6.7  /  Alb  2.1  /  TBili  1.0  /  DBili  x   /  AST  35  /  ALT  77  /  AlkPhos  318  11-14      Lipase, Serum: 192 U/L (11-14-20 @ 09:32)  Lipase, Serum: 192 U/L (11-13-20 @ 08:11)  Lipase, Serum: 1270 U/L (11-12-20 @ 08:50)  Lipase, Serum: 5926 U/L (11-11-20 @ 09:18)    eGFR if Non African American: 137 mL/min/1.73M2 (11-14-20 @ 09:32)  eGFR if : 159 mL/min/1.73M2 (11-14-20 @ 09:32)                                    Culture - Blood (collected 12 Nov 2020 18:44)  Source: .Blood Blood-Peripheral 2anaerobic  Preliminary Report (13 Nov 2020 19:01):    No growth to date.    Culture - Blood (collected 12 Nov 2020 18:44)  Source: .Blood Blood-Peripheral  Preliminary Report (13 Nov 2020 19:01):    No growth to date.    Culture - Urine (collected 11 Nov 2020 19:10)  Source: .Urine Clean Catch (Midstream)  Final Report (12 Nov 2020 15:13):    <10,000 CFU/mL Normal Urogenital Megan          Culture - Blood (collected 11-12-20 @ 18:44)  Source: .Blood Blood-Peripheral 2anaerobic  Preliminary Report (11-13-20 @ 19:01):    No growth to date.    Culture - Blood (collected 11-12-20 @ 18:44)  Source: .Blood Blood-Peripheral  Preliminary Report (11-13-20 @ 19:01):    No growth to date.    Culture - Urine (collected 11-11-20 @ 19:10)  Source: .Urine Clean Catch (Midstream)  Final Report (11-12-20 @ 15:13):    <10,000 CFU/mL Normal Urogenital Megan        RADIOLOGY & ADDITIONAL TESTS: Personally reviewed.     Consultant(s) Notes Reviewed:  [x] YES  [ ] NO - ID Dr. Harmon

## 2020-11-14 NOTE — CHART NOTE - NSCHARTNOTEFT_GEN_A_CORE
PGY-1 Service Note    Called by RN due to patient's complaint of fever 101.3. Asymptomatic per RN.    Patient seen and examined at bedside. Patient states she does not feel any different. Admits to some abdominal pain from surgery, denies chills, chest pain, palpitations, headaches, SOB, coughing, nausea, vomiting, numbness, tingling, suprapubic pain, increase frequency or pain with urination.    T(F): 98.6 (11-14-20 @ 22:38), Max: 101.3 (11-14-20 @ 20:37)  HR: 78 (11-14-20 @ 22:38) (78 - 99)  BP: 105/68 (11-14-20 @ 22:38) (100/65 - 105/68)  RR: 16 (11-14-20 @ 22:38) (16 - 17)  SpO2: 95% (11-14-20 @ 22:38) (91% - 95%)    Physical Exam:  General: no acute distress  HEENT: NCAT, no neck pain  Cardio: soft S1/S2, regular rate and rhythm  Lungs: clear to auscultation bilaterally  Abd: obese, soft, tender in RUQ, nondistended, normoactive bowel sounds  Ext: no pedal edema, no calf tenderness    A/P:  24 YO F with PMHX cholestasis of pregnancy (2 years ago), cholecystitis s/p cholecystectomy (POD#2). Called by RN for fever of 101.3.    - Ordered CXR, UA, Ucx, Bcx x2, CBC w/ diff  - Tylenol for fever  - RN to call if anything changes PGY-1 Service Note    Called by RN due to patient's complaint of fever 101.3. Asymptomatic per RN.    Patient seen and examined at bedside. Patient states she does not feel any different. Admits to some abdominal pain from surgery, denies chills, chest pain, palpitations, headaches, SOB, coughing, nausea, vomiting, numbness, tingling, suprapubic pain, increase frequency or pain with urination.    T(F): 98.6 (11-14-20 @ 22:38), Max: 101.3 (11-14-20 @ 20:37)  HR: 78 (11-14-20 @ 22:38) (78 - 99)  BP: 105/68 (11-14-20 @ 22:38) (100/65 - 105/68)  RR: 16 (11-14-20 @ 22:38) (16 - 17)  SpO2: 95% (11-14-20 @ 22:38) (91% - 95%)    Physical Exam:  General: no acute distress  HEENT: NCAT, no neck pain  Cardio: soft S1/S2, regular rate and rhythm  Lungs: clear to auscultation bilaterally  Abd: obese, soft, tender in RUQ, nondistended, normoactive bowel sounds  Ext: no pedal edema, no calf tenderness    A/P:  24 YO F with PMHX cholestasis of pregnancy (2 years ago), cholecystitis s/p cholecystectomy (at [place]), presented with abd pain and N/V postoperatively, admitted for 5mm retained CBD stone, now presenting with pancreatitis. Called by RN due to patient's complaint of fever 101.3    - Ordered CXR, UA, Ucx, Bcx x2, CBC w/ diff  - Tylenol for fever  - RN to call if anything changes

## 2020-11-15 LAB
ALBUMIN SERPL ELPH-MCNC: 2 G/DL — LOW (ref 3.3–5)
ALP SERPL-CCNC: 395 U/L — HIGH (ref 40–120)
ALT FLD-CCNC: 65 U/L — SIGNIFICANT CHANGE UP (ref 12–78)
ANION GAP SERPL CALC-SCNC: 9 MMOL/L — SIGNIFICANT CHANGE UP (ref 5–17)
APPEARANCE UR: CLEAR — SIGNIFICANT CHANGE UP
AST SERPL-CCNC: 37 U/L — SIGNIFICANT CHANGE UP (ref 15–37)
BILIRUB SERPL-MCNC: 1 MG/DL — SIGNIFICANT CHANGE UP (ref 0.2–1.2)
BILIRUB UR-MCNC: NEGATIVE — SIGNIFICANT CHANGE UP
BUN SERPL-MCNC: 2 MG/DL — LOW (ref 7–23)
CALCIUM SERPL-MCNC: 8.6 MG/DL — SIGNIFICANT CHANGE UP (ref 8.5–10.1)
CHLORIDE SERPL-SCNC: 100 MMOL/L — SIGNIFICANT CHANGE UP (ref 96–108)
CO2 SERPL-SCNC: 26 MMOL/L — SIGNIFICANT CHANGE UP (ref 22–31)
COLOR SPEC: YELLOW — SIGNIFICANT CHANGE UP
CREAT SERPL-MCNC: 0.43 MG/DL — LOW (ref 0.5–1.3)
DIFF PNL FLD: NEGATIVE — SIGNIFICANT CHANGE UP
GLUCOSE SERPL-MCNC: 77 MG/DL — SIGNIFICANT CHANGE UP (ref 70–99)
GLUCOSE UR QL: NEGATIVE — SIGNIFICANT CHANGE UP
HCT VFR BLD CALC: 34.4 % — LOW (ref 34.5–45)
HGB BLD-MCNC: 11.5 G/DL — SIGNIFICANT CHANGE UP (ref 11.5–15.5)
KETONES UR-MCNC: ABNORMAL
LEUKOCYTE ESTERASE UR-ACNC: NEGATIVE — SIGNIFICANT CHANGE UP
LIDOCAIN IGE QN: 167 U/L — SIGNIFICANT CHANGE UP (ref 73–393)
MCHC RBC-ENTMCNC: 28.4 PG — SIGNIFICANT CHANGE UP (ref 27–34)
MCHC RBC-ENTMCNC: 33.4 GM/DL — SIGNIFICANT CHANGE UP (ref 32–36)
MCV RBC AUTO: 84.9 FL — SIGNIFICANT CHANGE UP (ref 80–100)
NITRITE UR-MCNC: NEGATIVE — SIGNIFICANT CHANGE UP
NRBC # BLD: 0 /100 WBCS — SIGNIFICANT CHANGE UP (ref 0–0)
PH UR: 7 — SIGNIFICANT CHANGE UP (ref 5–8)
PLATELET # BLD AUTO: 428 K/UL — HIGH (ref 150–400)
POTASSIUM SERPL-MCNC: 3.9 MMOL/L — SIGNIFICANT CHANGE UP (ref 3.5–5.3)
POTASSIUM SERPL-SCNC: 3.9 MMOL/L — SIGNIFICANT CHANGE UP (ref 3.5–5.3)
PROT SERPL-MCNC: 6.3 G/DL — SIGNIFICANT CHANGE UP (ref 6–8.3)
PROT UR-MCNC: NEGATIVE — SIGNIFICANT CHANGE UP
RBC # BLD: 4.05 M/UL — SIGNIFICANT CHANGE UP (ref 3.8–5.2)
RBC # FLD: 13.7 % — SIGNIFICANT CHANGE UP (ref 10.3–14.5)
SODIUM SERPL-SCNC: 135 MMOL/L — SIGNIFICANT CHANGE UP (ref 135–145)
SP GR SPEC: 1.01 — SIGNIFICANT CHANGE UP (ref 1.01–1.02)
UROBILINOGEN FLD QL: 1
WBC # BLD: 16.89 K/UL — HIGH (ref 3.8–10.5)
WBC # FLD AUTO: 16.89 K/UL — HIGH (ref 3.8–10.5)

## 2020-11-15 PROCEDURE — 74177 CT ABD & PELVIS W/CONTRAST: CPT | Mod: 26

## 2020-11-15 PROCEDURE — 99233 SBSQ HOSP IP/OBS HIGH 50: CPT

## 2020-11-15 RX ORDER — PIPERACILLIN AND TAZOBACTAM 4; .5 G/20ML; G/20ML
3.38 INJECTION, POWDER, LYOPHILIZED, FOR SOLUTION INTRAVENOUS EVERY 8 HOURS
Refills: 0 | Status: DISCONTINUED | OUTPATIENT
Start: 2020-11-15 | End: 2020-11-15

## 2020-11-15 RX ORDER — ACETAMINOPHEN 500 MG
650 TABLET ORAL EVERY 6 HOURS
Refills: 0 | Status: DISCONTINUED | OUTPATIENT
Start: 2020-11-15 | End: 2020-11-17

## 2020-11-15 RX ORDER — POLYETHYLENE GLYCOL 3350 17 G/17G
17 POWDER, FOR SOLUTION ORAL DAILY
Refills: 0 | Status: DISCONTINUED | OUTPATIENT
Start: 2020-11-15 | End: 2020-11-17

## 2020-11-15 RX ORDER — PIPERACILLIN AND TAZOBACTAM 4; .5 G/20ML; G/20ML
3.38 INJECTION, POWDER, LYOPHILIZED, FOR SOLUTION INTRAVENOUS ONCE
Refills: 0 | Status: DISCONTINUED | OUTPATIENT
Start: 2020-11-15 | End: 2020-11-15

## 2020-11-15 RX ADMIN — HYDROMORPHONE HYDROCHLORIDE 0.2 MILLIGRAM(S): 2 INJECTION INTRAMUSCULAR; INTRAVENOUS; SUBCUTANEOUS at 16:07

## 2020-11-15 RX ADMIN — HYDROMORPHONE HYDROCHLORIDE 0.2 MILLIGRAM(S): 2 INJECTION INTRAMUSCULAR; INTRAVENOUS; SUBCUTANEOUS at 20:51

## 2020-11-15 RX ADMIN — HYDROMORPHONE HYDROCHLORIDE 0.2 MILLIGRAM(S): 2 INJECTION INTRAMUSCULAR; INTRAVENOUS; SUBCUTANEOUS at 06:00

## 2020-11-15 RX ADMIN — SODIUM CHLORIDE 100 MILLILITER(S): 9 INJECTION, SOLUTION INTRAVENOUS at 02:32

## 2020-11-15 RX ADMIN — HYDROMORPHONE HYDROCHLORIDE 0.2 MILLIGRAM(S): 2 INJECTION INTRAMUSCULAR; INTRAVENOUS; SUBCUTANEOUS at 20:21

## 2020-11-15 RX ADMIN — HYDROMORPHONE HYDROCHLORIDE 0.5 MILLIGRAM(S): 2 INJECTION INTRAMUSCULAR; INTRAVENOUS; SUBCUTANEOUS at 03:00

## 2020-11-15 RX ADMIN — POLYETHYLENE GLYCOL 3350 17 GRAM(S): 17 POWDER, FOR SOLUTION ORAL at 21:12

## 2020-11-15 RX ADMIN — HYDROMORPHONE HYDROCHLORIDE 0.2 MILLIGRAM(S): 2 INJECTION INTRAMUSCULAR; INTRAVENOUS; SUBCUTANEOUS at 12:22

## 2020-11-15 RX ADMIN — HYDROMORPHONE HYDROCHLORIDE 0.5 MILLIGRAM(S): 2 INJECTION INTRAMUSCULAR; INTRAVENOUS; SUBCUTANEOUS at 02:31

## 2020-11-15 RX ADMIN — HYDROMORPHONE HYDROCHLORIDE 0.5 MILLIGRAM(S): 2 INJECTION INTRAMUSCULAR; INTRAVENOUS; SUBCUTANEOUS at 06:46

## 2020-11-15 RX ADMIN — HYDROMORPHONE HYDROCHLORIDE 0.5 MILLIGRAM(S): 2 INJECTION INTRAMUSCULAR; INTRAVENOUS; SUBCUTANEOUS at 13:55

## 2020-11-15 RX ADMIN — Medication 650 MILLIGRAM(S): at 18:20

## 2020-11-15 RX ADMIN — HYDROMORPHONE HYDROCHLORIDE 0.2 MILLIGRAM(S): 2 INJECTION INTRAMUSCULAR; INTRAVENOUS; SUBCUTANEOUS at 05:32

## 2020-11-15 RX ADMIN — SODIUM CHLORIDE 100 MILLILITER(S): 9 INJECTION, SOLUTION INTRAVENOUS at 12:31

## 2020-11-15 RX ADMIN — Medication 650 MILLIGRAM(S): at 14:43

## 2020-11-15 RX ADMIN — HYDROMORPHONE HYDROCHLORIDE 0.5 MILLIGRAM(S): 2 INJECTION INTRAMUSCULAR; INTRAVENOUS; SUBCUTANEOUS at 12:31

## 2020-11-15 RX ADMIN — HYDROMORPHONE HYDROCHLORIDE 0.5 MILLIGRAM(S): 2 INJECTION INTRAMUSCULAR; INTRAVENOUS; SUBCUTANEOUS at 07:10

## 2020-11-15 RX ADMIN — Medication 1 BOTTLE: at 12:38

## 2020-11-15 RX ADMIN — ENOXAPARIN SODIUM 40 MILLIGRAM(S): 100 INJECTION SUBCUTANEOUS at 12:34

## 2020-11-15 RX ADMIN — PANTOPRAZOLE SODIUM 40 MILLIGRAM(S): 20 TABLET, DELAYED RELEASE ORAL at 06:53

## 2020-11-15 RX ADMIN — HYDROMORPHONE HYDROCHLORIDE 0.2 MILLIGRAM(S): 2 INJECTION INTRAMUSCULAR; INTRAVENOUS; SUBCUTANEOUS at 18:20

## 2020-11-15 RX ADMIN — HYDROMORPHONE HYDROCHLORIDE 0.2 MILLIGRAM(S): 2 INJECTION INTRAMUSCULAR; INTRAVENOUS; SUBCUTANEOUS at 10:33

## 2020-11-15 NOTE — DIETITIAN INITIAL EVALUATION ADULT. - SIGNS/SYMPTOMS
as evidenced by retained CBD stone s/p eliza, post ERCP pancreatitis, abdominal pain evidenced by retained CBD stone s/p ERCP w/ removal & stent placement, +pancreatitis, abdominal pain

## 2020-11-15 NOTE — DIETITIAN INITIAL EVALUATION ADULT. - OTHER INFO
(INCOMPLETE))  Pt A+Ox4; seen secondary to LOS > 7 days. Admitted secondary to severe abdominal pain, 5mm retained CBD stone s/p cholecystectomy pta. S/p ERCP with stone removal/stent placement, developed pancreatitis. Minimal po intake past week due to severe pain and NPO/CL status. Diet advanced to full liquids 11/13 with fair po intake; 50% documented in EMR. Further advanced to low fiber, low fat on 11/14. Improving abdominal pain. +constipation s/p mg citrate with small BM 11/14. Encourage po fluids with dietary fiber as tolerated. Encourage consumption of HBV protein sources with all meals. Pt A+Ox4; seen secondary to LOS > 7 days. Fatigued at visit; requested visit to be brief. Admitted secondary to severe abdominal pain, 5mm retained CBD stone s/p cholecystectomy pta. S/p ERCP with stone removal/stent placement, developed pancreatitis. Minimal po intake past week due to severe pain and NPO/CL diet status. Diet advanced to full liquids 11/13 with fair po intake; 50% documented in EMR. Further advanced to low fiber, low fat on 11/14. Pt states she is tolerating well. Appetite still not back to normal but doing much better. Improving abdominal pain. +constipation s/p mg citrate with small BM 11/14. Encourage po fluids with dietary fiber as tolerated. Encourage consumption of HBV protein sources with all meals. Pt declined ensure clear, prosource supplements. Pt did not wish to talk about diet however accepted written materials on low fat nutrition therapy for pancreatitis. Will remain available prn.

## 2020-11-15 NOTE — PROGRESS NOTE ADULT - SUBJECTIVE AND OBJECTIVE BOX
INTERVAL HPI/OVERNIGHT EVENTS:  Febrile overnight 38.3, tylenol given cultures sent  s/p Mag citrate yesterday refused fleets + BM  No N/V/D.  Tolerating diet.  LFTs downtrending      MEDICATIONS  (STANDING):  enoxaparin Injectable 40 milliGRAM(s) SubCutaneous daily  magnesium citrate Oral Solution 1 Bottle Oral once  pantoprazole    Tablet 40 milliGRAM(s) Oral before breakfast  saline laxative (FLEET) Rectal Enema 1 Enema Rectal once  sodium chloride 0.9% 1000 milliLiter(s) (100 mL/Hr) IV Continuous <Continuous>    MEDICATIONS  (PRN):  HYDROmorphone  Injectable 0.5 milliGRAM(s) IV Push every 4 hours PRN Severe Pain (7 - 10)  HYDROmorphone  Injectable 0.2 milliGRAM(s) IV Push every 4 hours PRN Moderate Pain (4 - 6)  ondansetron Injectable 4 milliGRAM(s) IV Push every 4 hours PRN Nausea and/or Vomiting      Allergies    cephalosporins (Rash (Moderate))  Cipro (Rash (Moderate))    Intolerances        General:  No wt loss, fevers, chills, night sweats, fatigue,   Eyes:  Good vision, no reported pain  ENT:  No sore throat, pain, runny nose, dysphagia  CV:  No pain, palpitations, hypo/hypertension  Resp:  No dyspnea, cough, tachypnea, wheezing  GI:  No pain, No nausea, No vomiting, No diarrhea, No constipation, No weight loss, No fever, No pruritis, No rectal bleeding, No tarry stools, No dysphagia,  :  No pain, bleeding, incontinence, nocturia  Muscle:  No pain, weakness  Neuro:  No weakness, tingling, memory problems  Psych:  No fatigue, insomnia, mood problems, depression  Endocrine:  No polyuria, polydipsia, cold/heat intolerance  Heme:  No petechiae, ecchymosis, easy bruisability  Skin:  No rash, tattoos, scars, edema      PHYSICAL EXAM:     Vital Signs Last 24 Hrs  T(C): 36.3 (15 Nov 2020 05:00), Max: 38.5 (14 Nov 2020 20:37)  T(F): 97.4 (15 Nov 2020 05:00), Max: 101.3 (14 Nov 2020 20:37)  HR: 91 (15 Nov 2020 05:00) (78 - 102)  BP: 108/74 (15 Nov 2020 05:00) (100/65 - 122/55)  BP(mean): --  RR: 18 (15 Nov 2020 05:00) (16 - 18)  SpO2: 95% (15 Nov 2020 05:00) (91% - 95%)      11-13-20 @ 07:01  -  11-14-20 @ 07:00  --------------------------------------------------------  IN: 2800 mL / OUT: 0 mL / NET: 2800 mL        GENERAL:  Appears stated age, well-groomed, well-nourished, no distress  HEENT:  NC/AT,  conjunctivae clear and pink, no thyromegaly, nodules, adenopathy, no JVD, sclera -anicteric  CHEST:  Full & symmetric excursion, no increased effort, breath sounds clear  HEART:  Regular rhythm, S1, S2, no murmur/rub/S3/S4, no abdominal bruit, no edema  ABDOMEN:  Soft, non-tender, non-distended, normoactive bowel sounds,  no masses ,no hepato-splenomegaly, no signs of chronic liver disease  EXTEREMITIES:  no cyanosis,clubbing or edema  SKIN:  No rash/erythema/ecchymoses/petechiae/wounds/abscess/warm/dry  NEURO:  Alert, oriented, no asterixis, no tremor, no encephalopathy            LABS:                        11.5   16.89 )-----------( 428      ( 15 Nov 2020 09:18 )             34.4     11-15    135  |  100  |  2<L>  ----------------------------<  77  3.9   |  26  |  0.43<L>    Ca    8.6      15 Nov 2020 09:18    TPro  6.3  /  Alb  2.0<L>  /  TBili  1.0  /  DBili  x   /  AST  37  /  ALT  65  /  AlkPhos  395<H>  11-15                    Culture - Blood (collected 12 Nov 2020 18:44)  Source: .Blood Blood-Peripheral 2anaerobic  Preliminary Report (13 Nov 2020 19:01):    No growth to date.    Culture - Blood (collected 12 Nov 2020 18:44)  Source: .Blood Blood-Peripheral  Preliminary Report (13 Nov 2020 19:01):    No growth to date.        RADIOLOGY   TPro  5.2<L>  /  Alb  1.7<L>  /  TBili  0.8  /  DBili  x   /  AST  26  /  ALT  71  /  AlkPhos  172<H>  11-13        Culture - Blood (collected 12 Nov 2020 18:44)  Source: .Blood Blood-Peripheral 2anaerobic  Preliminary Report (13 Nov 2020 19:01):    No growth to date.    Culture - Blood (collected 12 Nov 2020 18:44)  Source: .Blood Blood-Peripheral  Preliminary Report (13 Nov 2020 19:01):    No growth to date.    Culture - Urine (collected 11 Nov 2020 19:10)  Source: .Urine Clean Catch (Midstream)  Final Report (12 Nov 2020 15:13):    <10,000 CFU/mL Normal Urogenital Megan

## 2020-11-15 NOTE — DIETITIAN INITIAL EVALUATION ADULT. - PROBLEM SELECTOR PLAN 1
-abdominal pain 2/2 5mm retained CBD stone POD# 2 s/p cholecystectomy, with elevated total bilirubin 3.4  -US gallbladder 5 mm calculus in the region of the proximal common bile duct/cystic duct remnant which may represent retained stone.  -s/p 1L IVF bolus, Dilaudid .5 mg x 3, zofran 4 mg IV x1  -zofran 4 mg q6 PRN for nausea and or vomiting   -NPO for likely ERCP tomorrow  -start IVF rate 80 mL/hr with K added for 12 hours   -ketorolac 15 mg IVP q 6 for mild pain, Dilaudid .5 mg q 6 hours PRN for moderate pain, Dilaudid 1 mg PRN for severe pain   -consult surgery Dr. Kenney, consulted by ER   -consulted GI Dr. Delgadillo

## 2020-11-15 NOTE — PROGRESS NOTE ADULT - SUBJECTIVE AND OBJECTIVE BOX
Patient is a 25y old  Female who presents with a chief complaint of retained CBD stone s/p cholecystectomy (15 Nov 2020 15:05)      INTERVAL HPI/OVERNIGHT EVENTS: Patient seen and examined at bedside. Febrile overnight. Reports improvement in abd pain, tolerating diet advance. Had BM yesterday after bowel regimen.     MEDICATIONS  (STANDING):  enoxaparin Injectable 40 milliGRAM(s) SubCutaneous daily  pantoprazole    Tablet 40 milliGRAM(s) Oral before breakfast  polyethylene glycol 3350 17 Gram(s) Oral daily  saline laxative (FLEET) Rectal Enema 1 Enema Rectal once  sodium chloride 0.9% 1000 milliLiter(s) (100 mL/Hr) IV Continuous <Continuous>    MEDICATIONS  (PRN):  acetaminophen   Tablet .. 650 milliGRAM(s) Oral every 6 hours PRN Temp greater or equal to 38C (100.4F)  HYDROmorphone  Injectable 0.5 milliGRAM(s) IV Push every 4 hours PRN Severe Pain (7 - 10)  HYDROmorphone  Injectable 0.2 milliGRAM(s) IV Push every 4 hours PRN Moderate Pain (4 - 6)  ondansetron Injectable 4 milliGRAM(s) IV Push every 4 hours PRN Nausea and/or Vomiting      Allergies    cephalosporins (Rash (Moderate))  Cipro (Rash (Moderate))    Intolerances        REVIEW OF SYSTEMS:  CONSTITUTIONAL: No fever or chills  HEENT:  No headache, no sore throat  RESPIRATORY: No cough, wheezing, or shortness of breath  CARDIOVASCULAR: No chest pain, palpitations  GASTROINTESTINAL: mild abd pain, no nausea, vomiting, or diarrhea  GENITOURINARY: No dysuria, frequency, or hematuria  NEUROLOGICAL: no focal weakness or dizziness  MUSCULOSKELETAL: no myalgias     Vital Signs Last 24 Hrs  T(C): 37.4 (15 Nov 2020 13:22), Max: 38.5 (2020 20:37)  T(F): 99.3 (15 Nov 2020 13:22), Max: 101.3 (2020 20:37)  HR: 113 (15 Nov 2020 13:22) (78 - 113)  BP: 103/61 (15 Nov 2020 13:22) (100/65 - 108/74)  BP(mean): --  RR: 18 (15 Nov 2020 13:22) (16 - 18)  SpO2: 95% (15 Nov 2020 13:22) (91% - 95%)    PHYSICAL EXAM:  GENERAL: NAD, well-developed, well-groomed  HEENT:  anicteric, moist mucous membranes  CHEST/LUNG:  CTA b/l, no rales, wheezes, or rhonchi  HEART:  RRR, S1, S2  ABDOMEN:  BS+, soft, nontender, nondistended  EXTREMITIES: no edema, cyanosis, or calf tenderness  NERVOUS SYSTEM: answers questions and follows commands appropriately, grossly moves all extremities     LABS:                        11.5   16.89 )-----------( 428      ( 15 Nov 2020 09:18 )             34.4     11-15    135  |  100  |  2   ----------------------------<  77  3.9   |  26  |  0.43    Ca    8.6      15 Nov 2020 09:18    TPro  6.3  /  Alb  2.0  /  TBili  1.0  /  DBili  x   /  AST  37  /  ALT  65  /  AlkPhos  395  11-15      Lipase, Serum: 167 U/L (11-15-20 @ 09:18)  Lipase, Serum: 192 U/L (20 @ 09:32)  Lipase, Serum: 192 U/L (20 @ 08:11)  Lipase, Serum: 1270 U/L (20 @ 08:50)    eGFR if Non African American: 143 mL/min/1.73M2 (11-15-20 @ 09:18)  eGFR if African American: 166 mL/min/1.73M2 (11-15-20 @ 09:18)                                Urinalysis Basic - ( 15 Nov 2020 01:20 )    Color: Yellow / Appearance: Clear / S.010 / pH: x  Gluc: x / Ketone: Moderate  / Bili: Negative / Urobili: 1   Blood: x / Protein: Negative / Nitrite: Negative   Leuk Esterase: Negative / RBC: x / WBC x   Sq Epi: x / Non Sq Epi: x / Bacteria: x        Culture - Blood (collected 2020 18:44)  Source: .Blood Blood-Peripheral 2anaerobic  Preliminary Report (2020 19:01):    No growth to date.    Culture - Blood (collected 2020 18:44)  Source: .Blood Blood-Peripheral  Preliminary Report (2020 19:01):    No growth to date.    Culture - Urine (collected 2020 19:10)  Source: .Urine Clean Catch (Midstream)  Final Report (2020 15:13):    <10,000 CFU/mL Normal Urogenital Megan          Culture - Blood (collected 20 @ 18:44)  Source: .Blood Blood-Peripheral 2anaerobic  Preliminary Report (20 @ 19:01):    No growth to date.    Culture - Blood (collected 20 @ 18:44)  Source: .Blood Blood-Peripheral  Preliminary Report (20 @ 19:01):    No growth to date.    Culture - Urine (collected 20 @ 19:10)  Source: .Urine Clean Catch (Midstream)  Final Report (20 @ 15:13):    <10,000 CFU/mL Normal Urogenital Megan        RADIOLOGY & ADDITIONAL TESTS: Personally reviewed.   < from: CT Abdomen and Pelvis w/ IV Cont (11.15.20 @ 14:27) >    EXAM:  CT ABDOMEN AND PELVIS IC                            PROCEDURE DATE:  11/15/2020          INTERPRETATION:  CLINICAL INFORMATION: Fever and abdominal pain.    COMPARISON: CT abdomen pelvis 11/10/2020    PROCEDURE:  CT of the Abdomen and Pelviswas performed with intravenous contrast.  Intravenous contrast: 90 ml Omnipaque 350. 10 ml discarded.  Oral contrast: None.  Sagittal and coronal reformats were performed.    FINDINGS:  LOWER CHEST: Small bilateral pleural effusions with adjacent compressive atelectasis.    LIVER: Within normal limits.  BILE DUCTS: CBD stent in place. Trace pneumobilia.  GALLBLADDER: Cholecystectomy.  SPLEEN: Within normal limits.  PANCREAS: Normal pancreatic enhancement. Large peripancreatic fluid collections have encapsulated since prior chest CT.  ADRENALS: Within normal limits.  KIDNEYS/URETERS: Within normal limits.    BLADDER: Within normal limits.  REPRODUCTIVE ORGANS: Uterus and adnexa within normal limits.    BOWEL: No bowel obstruction. Appendix is normal.  PERITONEUM: No ascites.  VESSELS: Attenuated but patent distal superior mesenteric vein near the portal confluence. Patent splenic and portal veins.  RETROPERITONEUM/LYMPH NODES: No lymphadenopathy.  ABDOMINAL WALL: Postsurgical change.  BONES: Within normal limits.    IMPRESSION:  Multiple peripancreatic fluid collections. No evidence for pancreatic parenchymal necrosis. Attenuated but patent distal stomach. Mesenteric vein.    Small bilateral pleural effusions.            TORO VINES; Attending Radiologist  This document has been electronically signed. Nov 15 2020  2:55PM    < end of copied text >      Consultant(s) Notes Reviewed:  [x] YES  [ ] NO  Discussed with ID, Dr. Harmon Patient is a 25y old  Female who presents with a chief complaint of retained CBD stone s/p cholecystectomy (15 Nov 2020 15:05)    INTERVAL HPI/OVERNIGHT EVENTS: Patient seen and examined at bedside. Febrile overnight. Contributes fever overnight to "warm temperature of my room/hospital". Reports improvement in abd pain, tolerating diet advance. Had BM yesterday after bowel regimen.     MEDICATIONS  (STANDING):  enoxaparin Injectable 40 milliGRAM(s) SubCutaneous daily  pantoprazole    Tablet 40 milliGRAM(s) Oral before breakfast  polyethylene glycol 3350 17 Gram(s) Oral daily  saline laxative (FLEET) Rectal Enema 1 Enema Rectal once  sodium chloride 0.9% 1000 milliLiter(s) (100 mL/Hr) IV Continuous <Continuous>    MEDICATIONS  (PRN):  acetaminophen   Tablet .. 650 milliGRAM(s) Oral every 6 hours PRN Temp greater or equal to 38C (100.4F)  HYDROmorphone  Injectable 0.5 milliGRAM(s) IV Push every 4 hours PRN Severe Pain (7 - 10)  HYDROmorphone  Injectable 0.2 milliGRAM(s) IV Push every 4 hours PRN Moderate Pain (4 - 6)  ondansetron Injectable 4 milliGRAM(s) IV Push every 4 hours PRN Nausea and/or Vomiting      Allergies    cephalosporins (Rash (Moderate))  Cipro (Rash (Moderate))    Intolerances        REVIEW OF SYSTEMS:  CONSTITUTIONAL: No fever or chills  HEENT:  No headache, no sore throat  RESPIRATORY: No cough, wheezing, or shortness of breath  CARDIOVASCULAR: No chest pain, palpitations  GASTROINTESTINAL: mild abd pain, no nausea, vomiting, or diarrhea  GENITOURINARY: No dysuria, frequency, or hematuria  NEUROLOGICAL: no focal weakness or dizziness  MUSCULOSKELETAL: no myalgias     Vital Signs Last 24 Hrs  T(C): 37.4 (15 Nov 2020 13:22), Max: 38.5 (2020 20:37)  T(F): 99.3 (15 Nov 2020 13:22), Max: 101.3 (2020 20:37)  HR: 113 (15 Nov 2020 13:22) (78 - 113)  BP: 103/61 (15 Nov 2020 13:22) (100/65 - 108/74)  BP(mean): --  RR: 18 (15 Nov 2020 13:22) (16 - 18)  SpO2: 95% (15 Nov 2020 13:22) (91% - 95%)    PHYSICAL EXAM:  GENERAL: NAD, well-developed, well-groomed  HEENT:  anicteric, moist mucous membranes  CHEST/LUNG:  CTA b/l, no rales, wheezes, or rhonchi  HEART:  RRR, S1, S2  ABDOMEN:  BS+, soft, nontender, nondistended  EXTREMITIES: no edema, cyanosis, or calf tenderness  NERVOUS SYSTEM: answers questions and follows commands appropriately, grossly moves all extremities     LABS:                        11.5   16.89 )-----------( 428      ( 15 Nov 2020 09:18 )             34.4     11-15    135  |  100  |  2   ----------------------------<  77  3.9   |  26  |  0.43    Ca    8.6      15 Nov 2020 09:18    TPro  6.3  /  Alb  2.0  /  TBili  1.0  /  DBili  x   /  AST  37  /  ALT  65  /  AlkPhos  395  11-15      Lipase, Serum: 167 U/L (11-15-20 @ 09:18)  Lipase, Serum: 192 U/L (20 @ 09:32)  Lipase, Serum: 192 U/L (20 @ 08:11)  Lipase, Serum: 1270 U/L (20 @ 08:50)    eGFR if Non African American: 143 mL/min/1.73M2 (11-15-20 @ 09:18)  eGFR if African American: 166 mL/min/1.73M2 (11-15-20 @ 09:18)                                Urinalysis Basic - ( 15 Nov 2020 01:20 )    Color: Yellow / Appearance: Clear / S.010 / pH: x  Gluc: x / Ketone: Moderate  / Bili: Negative / Urobili: 1   Blood: x / Protein: Negative / Nitrite: Negative   Leuk Esterase: Negative / RBC: x / WBC x   Sq Epi: x / Non Sq Epi: x / Bacteria: x        Culture - Blood (collected 2020 18:44)  Source: .Blood Blood-Peripheral 2anaerobic  Preliminary Report (2020 19:01):    No growth to date.    Culture - Blood (collected 2020 18:44)  Source: .Blood Blood-Peripheral  Preliminary Report (2020 19:01):    No growth to date.    Culture - Urine (collected 2020 19:10)  Source: .Urine Clean Catch (Midstream)  Final Report (2020 15:13):    <10,000 CFU/mL Normal Urogenital Megan          Culture - Blood (collected 20 @ 18:44)  Source: .Blood Blood-Peripheral 2anaerobic  Preliminary Report (20 @ 19:01):    No growth to date.    Culture - Blood (collected 20 @ 18:44)  Source: .Blood Blood-Peripheral  Preliminary Report (20 @ 19:01):    No growth to date.    Culture - Urine (collected 20 @ 19:10)  Source: .Urine Clean Catch (Midstream)  Final Report (20 @ 15:13):    <10,000 CFU/mL Normal Urogenital Megan        RADIOLOGY & ADDITIONAL TESTS: Personally reviewed.   < from: CT Abdomen and Pelvis w/ IV Cont (11.15.20 @ 14:27) >    EXAM:  CT ABDOMEN AND PELVIS IC                            PROCEDURE DATE:  11/15/2020          INTERPRETATION:  CLINICAL INFORMATION: Fever and abdominal pain.    COMPARISON: CT abdomen pelvis 11/10/2020    PROCEDURE:  CT of the Abdomen and Pelviswas performed with intravenous contrast.  Intravenous contrast: 90 ml Omnipaque 350. 10 ml discarded.  Oral contrast: None.  Sagittal and coronal reformats were performed.    FINDINGS:  LOWER CHEST: Small bilateral pleural effusions with adjacent compressive atelectasis.    LIVER: Within normal limits.  BILE DUCTS: CBD stent in place. Trace pneumobilia.  GALLBLADDER: Cholecystectomy.  SPLEEN: Within normal limits.  PANCREAS: Normal pancreatic enhancement. Large peripancreatic fluid collections have encapsulated since prior chest CT.  ADRENALS: Within normal limits.  KIDNEYS/URETERS: Within normal limits.    BLADDER: Within normal limits.  REPRODUCTIVE ORGANS: Uterus and adnexa within normal limits.    BOWEL: No bowel obstruction. Appendix is normal.  PERITONEUM: No ascites.  VESSELS: Attenuated but patent distal superior mesenteric vein near the portal confluence. Patent splenic and portal veins.  RETROPERITONEUM/LYMPH NODES: No lymphadenopathy.  ABDOMINAL WALL: Postsurgical change.  BONES: Within normal limits.    IMPRESSION:  Multiple peripancreatic fluid collections. No evidence for pancreatic parenchymal necrosis. Attenuated but patent distal stomach. Mesenteric vein.    Small bilateral pleural effusions.            TORO MALHOTRAMD; Attending Radiologist  This document has been electronically signed. Nov 15 2020  2:55PM    < end of copied text >      Consultant(s) Notes Reviewed:  [x] YES  [ ] NO  Discussed with ID, Dr. Harmon

## 2020-11-15 NOTE — PROGRESS NOTE ADULT - ASSESSMENT
25 T/o woman with PMH of cholestasis of pregnancy 2 years ago, cholecystitis s/p cholecystectomy on 11/6 in [place], was admitted with acute RUQ pain on 11/8. She was diagnosed with retained CBD stone and had ERCP for remove stone with stent placement on 11/9. After procedure she had pancreatitis with severe epigastric pain, lipase went up to >60710. The same day had leukocytosis as well. I believe leukocytosis is reactional to inflammation in pancreas, as lipase is going down and inflammation is resolving, WBC will start trending down as well.     11/15: fever, increased WBC, abd pain, going for CT     Pancreatitis, CBD stone s/p ERCP and stent, fever, leukocytosis   - Blood and urine cultures are NGTD  - Will follow leukocytosis today 16k  - trend Liver enzymes   - agree with sending for CT   - low threshold for antibiotics-she can tolerate penicillins so if any concerning findings on CT start zosyn   - if ascites, diagnostic para fto nidhi out SBP  - GI on board  - incentive spirometer as she has atelectasis on exam and CXR which may explain the fevers     Discussed with Dr. Pippa Olea back tomorrow     Arnaldo Harmon, DO  Cell: [phone]  Pager [phone]  Infectious Disease Attending  After 5pm/weekends please call [phone] for all inquiries and new consults

## 2020-11-15 NOTE — PROGRESS NOTE ADULT - SUBJECTIVE AND OBJECTIVE BOX
DAVID DRISCOLL  MRN-[id number]    Follow Up:  ID following for fever, pancreatitis     Interval History: continues to have fevers and leukocytosis continues to have abd pain, not having BMs, poor appetite     ROS:    [ ] Unobtainable because:  [ X] All other systems negative    Constitutional: +fever, no chills  Head: no trauma  Eyes: no vision changes, no eye pain  ENT:  no sore throat, no rhinorrhea  Cardiovascular:  no chest pain, no palpitation  Respiratory:  no SOB, no cough  GI:  +abd pain, no vomiting, no diarrhea  urinary: no dysuria, no hematuria, no flank pain  musculoskeletal:  no joint pain, no joint swelling  skin:  no rash  neurology:  no headache, no seizure, no change in mental status  psych: no anxiety, no depression         Allergies  cephalosporins (Rash (Moderate))  Cipro (Rash (Moderate))        ANTIMICROBIALS:      OTHER MEDS:  acetaminophen   Tablet .. 650 milliGRAM(s) Oral every 6 hours PRN  enoxaparin Injectable 40 milliGRAM(s) SubCutaneous daily  HYDROmorphone  Injectable 0.5 milliGRAM(s) IV Push every 4 hours PRN  HYDROmorphone  Injectable 0.2 milliGRAM(s) IV Push every 4 hours PRN  ondansetron Injectable 4 milliGRAM(s) IV Push every 4 hours PRN  pantoprazole    Tablet 40 milliGRAM(s) Oral before breakfast  polyethylene glycol 3350 17 Gram(s) Oral daily  saline laxative (FLEET) Rectal Enema 1 Enema Rectal once  sodium chloride 0.9% 1000 milliLiter(s) IV Continuous <Continuous>      Physical Exam:  Vital Signs Last 24 Hrs  T(C): 37.4 (15 Nov 2020 13:22), Max: 38.5 (2020 20:37)  T(F): 99.3 (15 Nov 2020 13:22), Max: 101.3 (2020 20:37)  HR: 113 (15 Nov 2020 13:22) (78 - 113)  BP: 103/61 (15 Nov 2020 13:22) (100/65 - 108/74)  BP(mean): --  RR: 18 (15 Nov 2020 13:22) (16 - 18)  SpO2: 95% (15 Nov 2020 13:22) (91% - 95%)    General:    NAD,  non toxic  Head: atraumatic, normocephalic  Eye: normal sclera and conjunctiva  ENT:    no oral lesions, neck supple  Cardio:     regular S1, S2,  no murmur  Respiratory:    significantly decreased breath sounds at both lung bases ,    no wheezing  abd:     soft,   BS +,   tenderness RUQ and RLQ  :   no CVAT,  no suprapubic tenderness,   no  hilario  Musculoskeletal:   no joint swelling,   no edema  vascular: no central lines, +PIV   Skin:    no rash  Neurologic:     no focal deficit  psych: normal affect    WBC Count: 16.89 K/uL (11-15 @ 09:18)  WBC Count: 16.54 K/uL ( @ 21:19)  WBC Count: 17.24 K/uL ( @ 09:32)  WBC Count: 15.58 K/uL ( @ 08:11)  WBC Count: 14.83 K/uL ( @ 08:50)  WBC Count: 15.08 K/uL ( @ 09:18)  WBC Count: 16.35 K/uL (11-10 @ 08:47)                            11.5   16.89 )-----------( 428      ( 15 Nov 2020 09:18 )             34.4       11-15    135  |  100  |  2<L>  ----------------------------<  77  3.9   |  26  |  0.43<L>    Ca    8.6      15 Nov 2020 09:18    TPro  6.3  /  Alb  2.0<L>  /  TBili  1.0  /  DBili  x   /  AST  37  /  ALT  65  /  AlkPhos  395<H>  11-15      Urinalysis Basic - ( 15 Nov 2020 01:20 )    Color: Yellow / Appearance: Clear / S.010 / pH: x  Gluc: x / Ketone: Moderate  / Bili: Negative / Urobili: 1   Blood: x / Protein: Negative / Nitrite: Negative   Leuk Esterase: Negative / RBC: x / WBC x   Sq Epi: x / Non Sq Epi: x / Bacteria: x      Creatinine Trend: 0.43<--, 0.49<--, 0.35<--, 0.60<--, 0.69<--, 0.77<--      MICROBIOLOGY:  v  .Blood Blood-Peripheral  20   No growth to date.  --  --      .Urine Clean Catch (Midstream)  20   <10,000 CFU/mL Normal Urogenital Megan  --  --      COVID-19 PCR: NotDetec ()      D-Dimer Assay, Quantitative: 3360 ()    Procalcitonin, Serum: <0.05 (20 @ 08:38)    RADIOLOGY:    < from: CT Angio Chest w/ IV Cont (20 @ 03:07) >  IMPRESSION:  No pulmonary embolism within the confines of this exam.    Small bilateral pleural effusions and bibasilar subsegmental compressive atelectasis.    New moderate volume of upper abdominal ascites and infiltration of the peripancreatic fat reflective of interstitial pancreatitis as seen on prior exam.    Serpiginous focus of high attenuation in the stomach could represent ingested material or a small focus of active hemorrhage; correlate with clinical exam.    < end of copied text >

## 2020-11-15 NOTE — PROGRESS NOTE ADULT - PROBLEM SELECTOR PLAN 1
post ercp pancreatitis  slowly improving  cta chest reviewed w radiology  ivf, advance to clears  s/p relistor x1  ppi, anti emetics  pain management  oob as tolerated  will follow   repeat ercp with stent removal as outpt  s/p mag citrate x1   fleets

## 2020-11-15 NOTE — DIETITIAN INITIAL EVALUATION ADULT. - PROBLEM SELECTOR PLAN 3
-Alk P 356, ,  in ER in setting of retained CBD stone  -monitor liver function, f/u CMP   -avoid acetaminophen

## 2020-11-15 NOTE — DIETITIAN INITIAL EVALUATION ADULT. - ENTER TO (G/KG)
1.2

## 2020-11-15 NOTE — PROGRESS NOTE ADULT - ASSESSMENT
25F with PMH cholestasis of pregnancy (2018), hx cholecystitis/cholecystectomy at [facility] 2 days PTA presents with abd pain, n/v, transaminitis. Found to have 5mm retained CBD stone, s/p ERCP. Course complicated by pancreatitis.      #Choledocholithiasis  #Pancreatitis   -s/p ERCP with stone removal/stent placement. Developed pancreatitis   -Lipase downtrending - within normal limits  -Tolerating diet advance. On low fiber/low fat  -Continue pain control   -D/C IVF. Oral hydration encouraged  -Blood cultures x2 no growth to date  -GI consulted recommendations appreciated  -Follow up outpatient for stent removal     #Sepsis likely due to ?fluid collections  -Persistent leukocytosis, febrile overnight. Tachycardic   -Repeat CT abd/pelvis showing Multiple peripancreatic fluid collection   -Start IV Zosyn  -Tylenol PRN for fever  -Follow up repeat blood cultures x2     #Hypokalemia - resolved    #Constipation - resolved  -Continue bowel regimen  -GI following    #Prophylactic Measure  DVT prophylaxis: Lovenox       25F with PMH cholestasis of pregnancy (2018), hx cholecystitis/cholecystectomy at [facility] 2 days PTA presents with abd pain, n/v, transaminitis. Found to have 5mm retained CBD stone, s/p ERCP. Course complicated by pancreatitis.      #Choledocholithiasis  #Pancreatitis   -s/p ERCP with stone removal/stent placement. Developed pancreatitis   -Lipase downtrending - within normal limits  -Tolerating diet advance. On low fiber/low fat  -Continue pain control   -D/C IVF. Oral hydration encouraged  -Blood cultures x2 no growth to date  -GI consulted recommendations appreciated  -Follow up outpatient for stent removal     #Sepsis likely due to ?fluid collections   -Persistent leukocytosis, febrile overnight. Tachycardic   -Repeat CT abd/pelvis showing Multiple peripancreatic fluid collection   -Discussed with geoffrey RODRIGUEZ to hold off abx at this time. Patient clinically nontoxic appearing, stable  -Tylenol PRN for fever  -Follow up repeat blood cultures x2     #Hypokalemia - resolved    #Constipation - resolved  -Continue bowel regimen  -GI following    #Prophylactic Measure  DVT prophylaxis: Lovenox

## 2020-11-15 NOTE — DIETITIAN INITIAL EVALUATION ADULT. - PROBLEM SELECTOR PLAN 2
-K 3.3 in ER likely 2/2 vomiting   -hold off on oral K powder, unable to tolerate anything by mouth at this time   -start IVF rate 80 mL/hr with K added for 12 hours   -f/u CMP, replete as needed

## 2020-11-16 LAB
ANION GAP SERPL CALC-SCNC: 12 MMOL/L — SIGNIFICANT CHANGE UP (ref 5–17)
BUN SERPL-MCNC: 4 MG/DL — LOW (ref 7–23)
CALCIUM SERPL-MCNC: 8.6 MG/DL — SIGNIFICANT CHANGE UP (ref 8.5–10.1)
CHLORIDE SERPL-SCNC: 99 MMOL/L — SIGNIFICANT CHANGE UP (ref 96–108)
CO2 SERPL-SCNC: 23 MMOL/L — SIGNIFICANT CHANGE UP (ref 22–31)
CREAT SERPL-MCNC: 0.44 MG/DL — LOW (ref 0.5–1.3)
CULTURE RESULTS: SIGNIFICANT CHANGE UP
GLUCOSE SERPL-MCNC: 73 MG/DL — SIGNIFICANT CHANGE UP (ref 70–99)
HCT VFR BLD CALC: 33.2 % — LOW (ref 34.5–45)
HGB BLD-MCNC: 11 G/DL — LOW (ref 11.5–15.5)
MCHC RBC-ENTMCNC: 28 PG — SIGNIFICANT CHANGE UP (ref 27–34)
MCHC RBC-ENTMCNC: 33.1 GM/DL — SIGNIFICANT CHANGE UP (ref 32–36)
MCV RBC AUTO: 84.5 FL — SIGNIFICANT CHANGE UP (ref 80–100)
NRBC # BLD: 0 /100 WBCS — SIGNIFICANT CHANGE UP (ref 0–0)
PLATELET # BLD AUTO: 506 K/UL — HIGH (ref 150–400)
POTASSIUM SERPL-MCNC: 3.3 MMOL/L — LOW (ref 3.5–5.3)
POTASSIUM SERPL-SCNC: 3.3 MMOL/L — LOW (ref 3.5–5.3)
RBC # BLD: 3.93 M/UL — SIGNIFICANT CHANGE UP (ref 3.8–5.2)
RBC # FLD: 13.8 % — SIGNIFICANT CHANGE UP (ref 10.3–14.5)
SODIUM SERPL-SCNC: 134 MMOL/L — LOW (ref 135–145)
SPECIMEN SOURCE: SIGNIFICANT CHANGE UP
WBC # BLD: 19.91 K/UL — HIGH (ref 3.8–10.5)
WBC # FLD AUTO: 19.91 K/UL — HIGH (ref 3.8–10.5)

## 2020-11-16 PROCEDURE — 99233 SBSQ HOSP IP/OBS HIGH 50: CPT

## 2020-11-16 PROCEDURE — 99232 SBSQ HOSP IP/OBS MODERATE 35: CPT

## 2020-11-16 RX ORDER — SIMETHICONE 80 MG/1
80 TABLET, CHEWABLE ORAL ONCE
Refills: 0 | Status: COMPLETED | OUTPATIENT
Start: 2020-11-16 | End: 2020-11-16

## 2020-11-16 RX ORDER — ACETAMINOPHEN 500 MG
650 TABLET ORAL EVERY 6 HOURS
Refills: 0 | Status: DISCONTINUED | OUTPATIENT
Start: 2020-11-16 | End: 2020-11-17

## 2020-11-16 RX ORDER — OXYCODONE AND ACETAMINOPHEN 5; 325 MG/1; MG/1
0.5 TABLET ORAL EVERY 6 HOURS
Refills: 0 | Status: DISCONTINUED | OUTPATIENT
Start: 2020-11-16 | End: 2020-11-17

## 2020-11-16 RX ORDER — OXYCODONE AND ACETAMINOPHEN 5; 325 MG/1; MG/1
1 TABLET ORAL EVERY 6 HOURS
Refills: 0 | Status: DISCONTINUED | OUTPATIENT
Start: 2020-11-16 | End: 2020-11-17

## 2020-11-16 RX ADMIN — OXYCODONE AND ACETAMINOPHEN 1 TABLET(S): 5; 325 TABLET ORAL at 13:48

## 2020-11-16 RX ADMIN — HYDROMORPHONE HYDROCHLORIDE 0.2 MILLIGRAM(S): 2 INJECTION INTRAMUSCULAR; INTRAVENOUS; SUBCUTANEOUS at 00:56

## 2020-11-16 RX ADMIN — HYDROMORPHONE HYDROCHLORIDE 0.2 MILLIGRAM(S): 2 INJECTION INTRAMUSCULAR; INTRAVENOUS; SUBCUTANEOUS at 00:26

## 2020-11-16 RX ADMIN — OXYCODONE AND ACETAMINOPHEN 1 TABLET(S): 5; 325 TABLET ORAL at 21:02

## 2020-11-16 RX ADMIN — HYDROMORPHONE HYDROCHLORIDE 0.2 MILLIGRAM(S): 2 INJECTION INTRAMUSCULAR; INTRAVENOUS; SUBCUTANEOUS at 04:56

## 2020-11-16 RX ADMIN — HYDROMORPHONE HYDROCHLORIDE 0.5 MILLIGRAM(S): 2 INJECTION INTRAMUSCULAR; INTRAVENOUS; SUBCUTANEOUS at 10:00

## 2020-11-16 RX ADMIN — HYDROMORPHONE HYDROCHLORIDE 0.2 MILLIGRAM(S): 2 INJECTION INTRAMUSCULAR; INTRAVENOUS; SUBCUTANEOUS at 05:26

## 2020-11-16 RX ADMIN — SIMETHICONE 80 MILLIGRAM(S): 80 TABLET, CHEWABLE ORAL at 03:04

## 2020-11-16 RX ADMIN — OXYCODONE AND ACETAMINOPHEN 1 TABLET(S): 5; 325 TABLET ORAL at 20:02

## 2020-11-16 RX ADMIN — PANTOPRAZOLE SODIUM 40 MILLIGRAM(S): 20 TABLET, DELAYED RELEASE ORAL at 04:56

## 2020-11-16 RX ADMIN — ENOXAPARIN SODIUM 40 MILLIGRAM(S): 100 INJECTION SUBCUTANEOUS at 11:05

## 2020-11-16 RX ADMIN — HYDROMORPHONE HYDROCHLORIDE 0.5 MILLIGRAM(S): 2 INJECTION INTRAMUSCULAR; INTRAVENOUS; SUBCUTANEOUS at 09:06

## 2020-11-16 RX ADMIN — OXYCODONE AND ACETAMINOPHEN 1 TABLET(S): 5; 325 TABLET ORAL at 14:48

## 2020-11-16 NOTE — PROGRESS NOTE ADULT - ASSESSMENT
25F with PMH cholestasis of pregnancy (2018), hx cholecystitis/cholecystectomy at [facility] 2 days PTA presents with abd pain, n/v, transaminitis. Found to have 5mm retained CBD stone, s/p ERCP. Course complicated by pancreatitis.      #Choledocholithiasis  #Pancreatitis   -s/p ERCP with stone removal/stent placement. Developed pancreatitis   -Lipase downtrending - within normal limits  -Tolerating diet advance. On low fiber/low fat  -Continue pain control   -D/C IVF. Oral hydration encouraged  -Blood cultures x2 no growth to date  -GI consulted recommendations appreciated  -Follow up outpatient for stent removal     #Sepsis likely due to ?fluid collections   -Persistent leukocytosis, febrile overnight. Tachycardic   -Repeat CT abd/pelvis showing Multiple peripancreatic fluid collection   -Discussed with geoffrey RODRIGUEZ to hold off abx at this time. Patient clinically nontoxic appearing, stable  -Tylenol PRN for fever  -Follow up repeat blood cultures x2     #Hypokalemia - resolved    #Constipation - resolved  -Continue bowel regimen  -GI following    #Prophylactic Measure  DVT prophylaxis: Lovenox

## 2020-11-16 NOTE — PROGRESS NOTE ADULT - ASSESSMENT
26 YO F with PMHX cholestasis of pregnancy (2 years ago), cholecystitis s/p cholecystectomy (POD#4 at [facility]), presented with abd pain and N/V postoperatively, admitted for 5mm retained CBD stone, now presenting with pancreatitis s/p ERCP on 11/9.    1) Intractable abdominal pain 2/2 Pancreatitis & retained CBD stone s/p ERCP  2) Anxiety related to medical condition/hospitalization and pain  - improving  - lipase downtrended  - on regular diet  - GI & surgery recs appreciated  - d/c IV dilaudid   - tylenol prn mild-moderate pain; percocet 5/325 1 tab po q6h prn severe pain  - continue PPI  - anticipatory guidance and education regarding pain provided    2) Constipation 2/2 opioid use  - senna and miralax   - bisacodyl supp. prn    DIscussed with the primary team.     Giuliana Ingram MD

## 2020-11-16 NOTE — PROGRESS NOTE ADULT - SUBJECTIVE AND OBJECTIVE BOX
INTERVAL HPI/OVERNIGHT EVENTS:  Patient seen and examined at bedside. States she is feeling better, tolerating diet. States she still has abdominal soreness, with episodes of pain that come and go. Denies any CP, SOB.    ROS: All other review of systems is negative unless indicated above.    MEDICATIONS  (STANDING):  enoxaparin Injectable 40 milliGRAM(s) SubCutaneous daily  pantoprazole    Tablet 40 milliGRAM(s) Oral before breakfast  polyethylene glycol 3350 17 Gram(s) Oral daily  saline laxative (FLEET) Rectal Enema 1 Enema Rectal once    MEDICATIONS  (PRN):  acetaminophen   Tablet .. 650 milliGRAM(s) Oral every 6 hours PRN Temp greater or equal to 38C (100.4F)  acetaminophen   Tablet .. 650 milliGRAM(s) Oral every 6 hours PRN Mild Pain (1 - 3)  ondansetron Injectable 4 milliGRAM(s) IV Push every 4 hours PRN Nausea and/or Vomiting  oxycodone    5 mG/acetaminophen 325 mG 1 Tablet(s) Oral every 6 hours PRN Severe Pain (7 - 10)  oxycodone    5 mG/acetaminophen 325 mG 0.5 Tablet(s) Oral every 6 hours PRN Moderate Pain (4 - 6)      Allergies    cephalosporins (Rash (Moderate))  Cipro (Rash (Moderate))    Intolerances        Vital Signs Last 24 Hrs  T(C): 37.6 (2020 14:04), Max: 37.6 (2020 14:04)  T(F): 99.6 (2020 14:04), Max: 99.6 (2020 14:04)  HR: 107 (2020 14:04) (95 - 108)  BP: 105/69 (2020 14:04) (98/62 - 105/69)  BP(mean): --  RR: 18 (2020 14:04) (17 - 18)  SpO2: 94% (2020 14:04) (93% - 94%)    15 @ 07:01  -  16 @ 07:00  --------------------------------------------------------  IN: 240 mL / OUT: 0 mL / NET: 240 mL      Physical Exam:  General: WN/WD NAD  Neurology: A&Ox3, nonfocal, GREEN x 4  Respiratory: CTA B/L  CV: RRR, S1S2  Abdominal: Soft, +TTP epigastric region, no rebound, no guarding, ND +BS  Extremities: No edema, + peripheral pulses      LABS:                        11.0   19.91 )-----------( 506      ( 2020 09:39 )             33.2     11-16    134<L>  |  99  |  4<L>  ----------------------------<  73  3.3<L>   |  23  |  0.44<L>    Ca    8.6      2020 09:39    TPro  6.3  /  Alb  2.0<L>  /  TBili  1.0  /  DBili  x   /  AST  37  /  ALT  65  /  AlkPhos  395<H>  11-15      Urinalysis Basic - ( 15 Nov 2020 01:20 )    Color: Yellow / Appearance: Clear / S.010 / pH: x  Gluc: x / Ketone: Moderate  / Bili: Negative / Urobili: 1   Blood: x / Protein: Negative / Nitrite: Negative   Leuk Esterase: Negative / RBC: x / WBC x   Sq Epi: x / Non Sq Epi: x / Bacteria: x        RADIOLOGY & ADDITIONAL TESTS:

## 2020-11-16 NOTE — PROGRESS NOTE ADULT - NSHPATTENDINGPLANDISCUSS_GEN_ALL_CORE
patient, RN, ID
pt, RN
patient, Palliative, RN
patient, Palliative, RN, ID, GI, Surgery
pt, SW, CM, RN, residency team, gi - re: tx plan, disposition planning, above detailed plan
Dr Ney Kamara palliative, RN Rich

## 2020-11-16 NOTE — PROGRESS NOTE ADULT - PROBLEM SELECTOR PLAN 1
post ercp pancreatitis; overall improved  repeat ct w multiple peripancreatic fluid collections wo necrosis; bump in leukocytosis noted  f/u id recs  cont ppi  cont bowel regimen  prn pain control; trend labs; oob   low fat diet as tolerated  will need interval imaging in 4-6 wks and repeat ercp with stent removal as op  to follow

## 2020-11-16 NOTE — PROGRESS NOTE ADULT - SUBJECTIVE AND OBJECTIVE BOX
SUBJECTIVE AND OBJECTIVE:  INTERVAL HPI/OVERNIGHT EVENTS:    DNR on chart:   Allergies    cephalosporins (Rash (Moderate))  Cipro (Rash (Moderate))    Intolerances    MEDICATIONS  (STANDING):  enoxaparin Injectable 40 milliGRAM(s) SubCutaneous daily  pantoprazole    Tablet 40 milliGRAM(s) Oral before breakfast  polyethylene glycol 3350 17 Gram(s) Oral daily  saline laxative (FLEET) Rectal Enema 1 Enema Rectal once    MEDICATIONS  (PRN):  acetaminophen   Tablet .. 650 milliGRAM(s) Oral every 6 hours PRN Temp greater or equal to 38C (100.4F)  HYDROmorphone  Injectable 0.5 milliGRAM(s) IV Push every 4 hours PRN Severe Pain (7 - 10)  HYDROmorphone  Injectable 0.2 milliGRAM(s) IV Push every 4 hours PRN Moderate Pain (4 - 6)  ondansetron Injectable 4 milliGRAM(s) IV Push every 4 hours PRN Nausea and/or Vomiting      ITEMS UNCHECKED ARE NOT PRESENT    PRESENT SYMPTOMS: [ ]Unable to obtain due to poor mentation   Source if other than patient:  [ ]Family   [ ]Team     Pain:  [ ]yes [ ]no  QOL impact -   Location -                    Aggravating factors -  Quality -  Radiation -  Timing-  Severity (0-10 scale):  Minimal acceptable level (0-10 scale):     Dyspnea:                           [ ]Mild [ ]Moderate [ ]Severe  Anxiety:                             [ ]Mild [ ]Moderate [ ]Severe  Fatigue:                             [ ]Mild [ ]Moderate [ ]Severe  Nausea:                             [ ]Mild [ ]Moderate [ ]Severe  Loss of appetite:              [ ]Mild [ ]Moderate [ ]Severe  Constipation:                    [ ]Mild [ ]Moderate [ ]Severe    CPOT:    https://www.[facility].org/getattachment/[email or web address]/Critical-Care-Pain-Observation-Tool-(CPOT)    PAIN AD Score:	  http://geriatrictoolkit.[place].[facility]/cog/painad.pdf (Ctrl + left click to view)    Other Symptoms:  [ ]All other review of systems negative     Palliative Performance Status Version 2:         %      http://npcrc.org/files/news/palliative_performance_scale_ppsv2.pdf  PHYSICAL EXAM:  Vital Signs Last 24 Hrs  T(C): 37.2 (2020 05:22), Max: 37.4 (15 Nov 2020 13:22)  T(F): 99 (:), Max: 99.3 (15 Nov 2020 13:)  HR: 108 (:) (95 - 113)  BP: 98/62 (:) (98/62 - 103/61)  BP(mean): --  RR: 18 (:) (17 - 18)  SpO2: 93% (:) (93% - 95%) I&O's Summary    15 Nov 2020 07:01  -  2020 07:00  --------------------------------------------------------  IN: 240 mL / OUT: 0 mL / NET: 240 mL       GENERAL:  [ ]Alert  [ ]Oriented x   [ ]Lethargic  [ ]Cachexia  [ ]Unarousable  [ ]Verbal  [ ]Non-Verbal  Behavioral:   [ ]Anxiety  [ ]Delirium [ ]Agitation [ ]Other  HEENT:  [ ]Normal   [ ]Dry mouth   [ ]ET Tube/Trach  [ ]Oral lesions  PULMONARY:   [ ]Clear [ ]Tachypnea  [ ]Audible excessive secretions   [ ]Rhonchi        [ ]Right [ ]Left [ ]Bilateral  [ ]Crackles        [ ]Right [ ]Left [ ]Bilateral  [ ]Wheezing     [ ]Right [ ]Left [ ]Bilateral  [ ]Diminished BS [ ] Right [ ]Left [ ]Bilateral  CARDIOVASCULAR:    [ ]Regular [ ]Irregular [ ]Tachy  [ ]Phi [ ]Murmur [ ]Other  GASTROINTESTINAL:  [ ]Soft  [ ]Distended   [ ]+BS  [ ]Non tender [ ]Tender  [ ]PEG [ ]OGT/ NGT   Last BM:      GENITOURINARY:  [ ]Normal [ ]Incontinent   [ ]Oliguria/Anuria   [ ]Fraga  MUSCULOSKELETAL:   [ ]Normal   [ ]Weakness  [ ]Bed/Wheelchair bound [ ]Edema  NEUROLOGIC:   [ ]No focal deficits  [ ] Cognitive impairment  [ ] Dysphagia [ ]Dysarthria [ ] Paresis [ ]Other   SKIN:   [ ]Normal  [ ]Rash   [ ]Pressure ulcer(s) [ ]y [ ]n present on admission    CRITICAL CARE:  [ ]Shock Present  [ ]Septic [ ]Cardiogenic [ ]Neurologic [ ]Hypovolemic  [ ]Vasopressors [ ]Inotropes  [ ]Respiratory failure present [ ]Mechanical Ventilation [ ]Non-invasive ventilatory support [ ]High-Flow   [ ]Acute  [ ]Chronic [ ]Hypoxic  [ ]Hypercarbic [ ]Other  [ ]Other organ failure     LABS:                        11.5   16.89 )-----------( 428      ( 15 Nov 2020 09:18 )             34.4       134<L>  |  99  |  4<L>  ----------------------------<  73  3.3<L>   |  23  |  0.44<L>    Ca    8.6      2020 09:39    TPro  6.3  /  Alb  2.0<L>  /  TBili  1.0  /  DBili  x   /  AST  37  /  ALT  65  /  AlkPhos  395<H>  11-15      Urinalysis Basic - ( 15 Nov 2020 01:20 )    Color: Yellow / Appearance: Clear / S.010 / pH: x  Gluc: x / Ketone: Moderate  / Bili: Negative / Urobili: 1   Blood: x / Protein: Negative / Nitrite: Negative   Leuk Esterase: Negative / RBC: x / WBC x   Sq Epi: x / Non Sq Epi: x / Bacteria: x      RADIOLOGY & ADDITIONAL STUDIES:    Protein Calorie Malnutrition Present: [ ]mild [ ]moderate [ ]severe [ ]underweight [ ]morbid obesity  https://www.andeal.org/vault/2440/web/files/ONC/Table_Clinical%20Characteristics%20to%20Document%20Malnutrition-White%20JV%20et%20al%2020.pdf    Height (cm): 157.5 (20 @ 15:01)  Weight (kg): 79.4 (20 @ 12:39)  BMI (kg/m2): 32 (20 @ 15:01)    [ ]PPSV2 < or = 30%  [ ]significant weight loss [ ]poor nutritional intake [ ]anasarca   Albumin, Serum: 2.0 g/dL (11-15-20 @ 09:18)   [ ]Artificial Nutrition    REFERRALS:   [ ]Chaplaincy  [ ]Hospice  [ ]Child Life  [ ]Social Work  [ ]Case management [ ]Holistic Therapy     Goals of Care Document:     SUBJECTIVE AND OBJECTIVE/INTERVAL HPI/OVERNIGHT EVENTS:  - no acute events overnight  - abd pain improving. Tolerating regular diet. No constipation.     DNR on chart:   Allergies    cephalosporins (Rash (Moderate))  Cipro (Rash (Moderate))    Intolerances    MEDICATIONS  (STANDING):  enoxaparin Injectable 40 milliGRAM(s) SubCutaneous daily  pantoprazole    Tablet 40 milliGRAM(s) Oral before breakfast  polyethylene glycol 3350 17 Gram(s) Oral daily  saline laxative (FLEET) Rectal Enema 1 Enema Rectal once    MEDICATIONS  (PRN):  acetaminophen   Tablet .. 650 milliGRAM(s) Oral every 6 hours PRN Temp greater or equal to 38C (100.4F)  HYDROmorphone  Injectable 0.5 milliGRAM(s) IV Push every 4 hours PRN Severe Pain (7 - 10)  HYDROmorphone  Injectable 0.2 milliGRAM(s) IV Push every 4 hours PRN Moderate Pain (4 - 6)  ondansetron Injectable 4 milliGRAM(s) IV Push every 4 hours PRN Nausea and/or Vomiting      ITEMS UNCHECKED ARE NOT PRESENT    PRESENT SYMPTOMS: [ ]Unable to obtain due to poor mentation   Source if other than patient:  [ ]Family   [ ]Team     Pain:  [x ]yes [ ]no  QOL impact - epigatsric  Location -      epigastric              Aggravating factors - food  Quality - dull  Radiation - no  Timing- intermittent  Severity (0-10 scale): 4/10  Minimal acceptable level (0-10 scale): 5/10    Dyspnea:                           [ ]Mild [ ]Moderate [ ]Severe  Anxiety:                             [ ]Mild [ ]Moderate [ ]Severe  Fatigue:                             [ ]Mild [ ]Moderate [ ]Severe  Nausea:                             [ ]Mild [ ]Moderate [ ]Severe  Loss of appetite:              [ ]Mild [ ]Moderate [ ]Severe  Constipation:                    [ ]Mild [ ]Moderate [ ]Severe    CPOT:    https://www.[facility].org/getattachment/[email or web address]/Critical-Care-Pain-Observation-Tool-(CPOT)    PAIN AD Score:	  http://geriatrictoolkit.[facility]/cog/painad.pdf (Ctrl + left click to view)    Other Symptoms:  [ x]All other review of systems negative     Palliative Performance Status Version 2:        80 %      http://npcrc.org/files/news/palliative_performance_scale_ppsv2.pdf  PHYSICAL EXAM:  Vital Signs Last 24 Hrs  T(C): 37.2 (2020 05:22), Max: 37.4 (15 Nov 2020 13:22)  T(F): 99 (2020 05:22), Max: 99.3 (15 Nov 2020 13:22)  HR: 108 (:22) (95 - 113)  BP: 98/62 (2020 05:22) (98/62 - 103/61)  BP(mean): --  RR: 18 (2020 05:22) (17 - 18)  SpO2: 93% (:) (93% - 95%) I&O's Summary    15 Nov 2020 07:01  -  2020 07:00  --------------------------------------------------------  IN: 240 mL / OUT: 0 mL / NET: 240 mL    General: WN/WD NAD  Neurology: A&Ox3, nonfocal, GREEN x 4  Respiratory: CTA B/L  CV: RRR, S1S2  Abdominal: Soft, +TTP epigastric region, no rebound, no guarding, ND +BS  Extremities: No edema, + peripheral pulses  Psych: normal affect    LABS:                        11.5   16.89 )-----------( 428      ( 15 Nov 2020 09:18 )             34.4   11-16    134<L>  |  99  |  4<L>  ----------------------------<  73  3.3<L>   |  23  |  0.44<L>    Ca    8.6      2020 09:39    TPro  6.3  /  Alb  2.0<L>  /  TBili  1.0  /  DBili  x   /  AST  37  /  ALT  65  /  AlkPhos  395<H>  11-15      Urinalysis Basic - ( 15 Nov 2020 01:20 )    Color: Yellow / Appearance: Clear / S.010 / pH: x  Gluc: x / Ketone: Moderate  / Bili: Negative / Urobili: 1   Blood: x / Protein: Negative / Nitrite: Negative   Leuk Esterase: Negative / RBC: x / WBC x   Sq Epi: x / Non Sq Epi: x / Bacteria: x      RADIOLOGY & ADDITIONAL STUDIES: reviewed    Protein Calorie Malnutrition Present: [ ]mild [ ]moderate [ ]severe [ ]underweight [ ]morbid obesity  https://www.andeal.org/vault/2440/web/files/ONC/Table_Clinical%20Characteristics%20to%20Document%20Malnutrition-White%20JV%20et%20al%2020.pdf    Height (cm): 157.5 (20 @ 15:01)  Weight (kg): 79.4 (20 @ 12:39)  BMI (kg/m2): 32 (20 @ 15:01)    [ ]PPSV2 < or = 30%  [ ]significant weight loss [ ]poor nutritional intake [ ]anasarca   Albumin, Serum: 2.0 g/dL (11-15-20 @ 09:18)   [ ]Artificial Nutrition    REFERRALS:   [ ]Chaplaincy  [ ]Hospice  [ ]Child Life  [ ]Social Work  [ ]Case management [ ]Holistic Therapy     Goals of Care Document:

## 2020-11-16 NOTE — PROGRESS NOTE ADULT - SUBJECTIVE AND OBJECTIVE BOX
INTERVAL HPI/OVERNIGHT EVENTS:  pt seen and examined  c/o right sided abd/flank pain  denies n/v  had bm yesterday  tolerating diet  required pain med x3 overnight per mar      MEDICATIONS  (STANDING):  enoxaparin Injectable 40 milliGRAM(s) SubCutaneous daily  magnesium citrate Oral Solution 1 Bottle Oral once  pantoprazole    Tablet 40 milliGRAM(s) Oral before breakfast  saline laxative (FLEET) Rectal Enema 1 Enema Rectal once  sodium chloride 0.9% 1000 milliLiter(s) (100 mL/Hr) IV Continuous <Continuous>    MEDICATIONS  (PRN):  HYDROmorphone  Injectable 0.5 milliGRAM(s) IV Push every 4 hours PRN Severe Pain (7 - 10)  HYDROmorphone  Injectable 0.2 milliGRAM(s) IV Push every 4 hours PRN Moderate Pain (4 - 6)  ondansetron Injectable 4 milliGRAM(s) IV Push every 4 hours PRN Nausea and/or Vomiting      Allergies    cephalosporins (Rash (Moderate))  Cipro (Rash (Moderate))    Intolerances        General:  No wt loss, fevers, chills, night sweats, fatigue,   Eyes:  Good vision, no reported pain  ENT:  No sore throat, pain, runny nose, dysphagia  CV:  No pain, palpitations, hypo/hypertension  Resp:  No dyspnea, cough, tachypnea, wheezing  GI:  see above  :  No pain, bleeding, incontinence, nocturia  Muscle:  No pain, weakness  Neuro:  No weakness, tingling, memory problems  Psych:  No fatigue, insomnia, mood problems, depression  Endocrine:  No polyuria, polydipsia, cold/heat intolerance  Heme:  No petechiae, ecchymosis, easy bruisability  Skin:  No rash, tattoos, scars, edema      PHYSICAL EXAM:     Vital Signs Last 24 Hrs  T(C): 36.3 (15 Nov 2020 05:00), Max: 38.5 (14 Nov 2020 20:37)  T(F): 97.4 (15 Nov 2020 05:00), Max: 101.3 (14 Nov 2020 20:37)  HR: 91 (15 Nov 2020 05:00) (78 - 102)  BP: 108/74 (15 Nov 2020 05:00) (100/65 - 122/55)  BP(mean): --  RR: 18 (15 Nov 2020 05:00) (16 - 18)  SpO2: 95% (15 Nov 2020 05:00) (91% - 95%)            Constitutional: lying in bed   HEENT: ncat  Gastrointestinal: soft ttp right sided/mid abd mild dt  Extremities: No peripheral edema  Vascular: + peripheral pulses  Neurological: A/O x 3          LABS:                        11.5   16.89 )-----------( 428      ( 15 Nov 2020 09:18 )             34.4     11-15    135  |  100  |  2<L>  ----------------------------<  77  3.9   |  26  |  0.43<L>    Ca    8.6      15 Nov 2020 09:18    TPro  6.3  /  Alb  2.0<L>  /  TBili  1.0  /  DBili  x   /  AST  37  /  ALT  65  /  AlkPhos  395<H>  11-15                                RADIOLOGY   < from: CT Abdomen and Pelvis w/ IV Cont (11.15.20 @ 14:27) >    EXAM:  CT ABDOMEN AND PELVIS IC                            PROCEDURE DATE:  11/15/2020          INTERPRETATION:  CLINICAL INFORMATION: Fever and abdominal pain.    COMPARISON: CT abdomen pelvis 11/10/2020    PROCEDURE:  CT of the Abdomen and Pelviswas performed with intravenous contrast.  Intravenous contrast: 90 ml Omnipaque 350. 10 ml discarded.  Oral contrast: None.  Sagittal and coronal reformats were performed.    FINDINGS:  LOWER CHEST: Small bilateral pleural effusions with adjacent compressive atelectasis.    LIVER: Within normal limits.  BILE DUCTS: CBD stent in place. Trace pneumobilia.  GALLBLADDER: Cholecystectomy.  SPLEEN: Within normal limits.  PANCREAS: Normal pancreatic enhancement. Large peripancreatic fluid collections have encapsulated since prior chest CT.  ADRENALS: Within normal limits.  KIDNEYS/URETERS: Within normal limits.    BLADDER: Within normal limits.  REPRODUCTIVE ORGANS: Uterus and adnexa within normal limits.    BOWEL: No bowel obstruction. Appendix is normal.  PERITONEUM: No ascites.  VESSELS: Attenuated but patent distal superior mesenteric vein near the portal confluence. Patent splenic and portal veins.  RETROPERITONEUM/LYMPH NODES: No lymphadenopathy.  ABDOMINAL WALL: Postsurgical change.  BONES: Within normal limits.    IMPRESSION:  Multiple peripancreatic fluid collections. No evidence for pancreatic parenchymal necrosis. Attenuated but patent distal stomach. Mesenteric vein.    Small bilateral pleural effusions.            TORO VINES; Attending Radiologist  This document has been electronically signed. Nov 15 2020  2:55PM    < end of copied text >   INTERVAL HPI/OVERNIGHT EVENTS:  pt seen and examined  c/o right sided abd/flank pain  denies n/v  had bm yesterday  tolerating diet  required pain med x3 overnight per mar  labs noted      MEDICATIONS  (STANDING):  enoxaparin Injectable 40 milliGRAM(s) SubCutaneous daily  magnesium citrate Oral Solution 1 Bottle Oral once  pantoprazole    Tablet 40 milliGRAM(s) Oral before breakfast  saline laxative (FLEET) Rectal Enema 1 Enema Rectal once  sodium chloride 0.9% 1000 milliLiter(s) (100 mL/Hr) IV Continuous <Continuous>    MEDICATIONS  (PRN):  HYDROmorphone  Injectable 0.5 milliGRAM(s) IV Push every 4 hours PRN Severe Pain (7 - 10)  HYDROmorphone  Injectable 0.2 milliGRAM(s) IV Push every 4 hours PRN Moderate Pain (4 - 6)  ondansetron Injectable 4 milliGRAM(s) IV Push every 4 hours PRN Nausea and/or Vomiting      Allergies    cephalosporins (Rash (Moderate))  Cipro (Rash (Moderate))    Intolerances        General:  No wt loss, fevers, chills, night sweats, fatigue,   Eyes:  Good vision, no reported pain  ENT:  No sore throat, pain, runny nose, dysphagia  CV:  No pain, palpitations, hypo/hypertension  Resp:  No dyspnea, cough, tachypnea, wheezing  GI:  see above  :  No pain, bleeding, incontinence, nocturia  Muscle:  No pain, weakness  Neuro:  No weakness, tingling, memory problems  Psych:  No fatigue, insomnia, mood problems, depression  Endocrine:  No polyuria, polydipsia, cold/heat intolerance  Heme:  No petechiae, ecchymosis, easy bruisability  Skin:  No rash, tattoos, scars, edema      PHYSICAL EXAM:     Vital Signs Last 24 Hrs  T(C): 36.3 (15 Nov 2020 05:00), Max: 38.5 (14 Nov 2020 20:37)  T(F): 97.4 (15 Nov 2020 05:00), Max: 101.3 (14 Nov 2020 20:37)  HR: 91 (15 Nov 2020 05:00) (78 - 102)  BP: 108/74 (15 Nov 2020 05:00) (100/65 - 122/55)  BP(mean): --  RR: 18 (15 Nov 2020 05:00) (16 - 18)  SpO2: 95% (15 Nov 2020 05:00) (91% - 95%)            Constitutional: lying in bed   HEENT: ncat  Gastrointestinal: soft ttp right sided/mid abd mild dt  Extremities: No peripheral edema  Vascular: + peripheral pulses  Neurological: A/O x 3          LABS:                        11.5   16.89 )-----------( 428      ( 15 Nov 2020 09:18 )             34.4     11-15    135  |  100  |  2<L>  ----------------------------<  77  3.9   |  26  |  0.43<L>    Ca    8.6      15 Nov 2020 09:18    TPro  6.3  /  Alb  2.0<L>  /  TBili  1.0  /  DBili  x   /  AST  37  /  ALT  65  /  AlkPhos  395<H>  11-15                                RADIOLOGY   < from: CT Abdomen and Pelvis w/ IV Cont (11.15.20 @ 14:27) >    EXAM:  CT ABDOMEN AND PELVIS IC                            PROCEDURE DATE:  11/15/2020          INTERPRETATION:  CLINICAL INFORMATION: Fever and abdominal pain.    COMPARISON: CT abdomen pelvis 11/10/2020    PROCEDURE:  CT of the Abdomen and Pelviswas performed with intravenous contrast.  Intravenous contrast: 90 ml Omnipaque 350. 10 ml discarded.  Oral contrast: None.  Sagittal and coronal reformats were performed.    FINDINGS:  LOWER CHEST: Small bilateral pleural effusions with adjacent compressive atelectasis.    LIVER: Within normal limits.  BILE DUCTS: CBD stent in place. Trace pneumobilia.  GALLBLADDER: Cholecystectomy.  SPLEEN: Within normal limits.  PANCREAS: Normal pancreatic enhancement. Large peripancreatic fluid collections have encapsulated since prior chest CT.  ADRENALS: Within normal limits.  KIDNEYS/URETERS: Within normal limits.    BLADDER: Within normal limits.  REPRODUCTIVE ORGANS: Uterus and adnexa within normal limits.    BOWEL: No bowel obstruction. Appendix is normal.  PERITONEUM: No ascites.  VESSELS: Attenuated but patent distal superior mesenteric vein near the portal confluence. Patent splenic and portal veins.  RETROPERITONEUM/LYMPH NODES: No lymphadenopathy.  ABDOMINAL WALL: Postsurgical change.  BONES: Within normal limits.    IMPRESSION:  Multiple peripancreatic fluid collections. No evidence for pancreatic parenchymal necrosis. Attenuated but patent distal stomach. Mesenteric vein.    Small bilateral pleural effusions.            TORO VINES; Attending Radiologist  This document has been electronically signed. Nov 15 2020  2:55PM    < end of copied text >

## 2020-11-17 ENCOUNTER — TRANSCRIPTION ENCOUNTER (OUTPATIENT)
Age: 25
End: 2020-11-17

## 2020-11-17 VITALS
DIASTOLIC BLOOD PRESSURE: 69 MMHG | SYSTOLIC BLOOD PRESSURE: 111 MMHG | HEART RATE: 71 BPM | RESPIRATION RATE: 18 BRPM | OXYGEN SATURATION: 97 % | TEMPERATURE: 98 F

## 2020-11-17 LAB
ALBUMIN SERPL ELPH-MCNC: 2.2 G/DL — LOW (ref 3.3–5)
ALP SERPL-CCNC: 401 U/L — HIGH (ref 40–120)
ALT FLD-CCNC: 52 U/L — SIGNIFICANT CHANGE UP (ref 12–78)
ANION GAP SERPL CALC-SCNC: 10 MMOL/L — SIGNIFICANT CHANGE UP (ref 5–17)
AST SERPL-CCNC: 31 U/L — SIGNIFICANT CHANGE UP (ref 15–37)
BILIRUB SERPL-MCNC: 0.7 MG/DL — SIGNIFICANT CHANGE UP (ref 0.2–1.2)
BUN SERPL-MCNC: 4 MG/DL — LOW (ref 7–23)
CALCIUM SERPL-MCNC: 8.6 MG/DL — SIGNIFICANT CHANGE UP (ref 8.5–10.1)
CHLORIDE SERPL-SCNC: 100 MMOL/L — SIGNIFICANT CHANGE UP (ref 96–108)
CO2 SERPL-SCNC: 26 MMOL/L — SIGNIFICANT CHANGE UP (ref 22–31)
CREAT SERPL-MCNC: 0.46 MG/DL — LOW (ref 0.5–1.3)
CULTURE RESULTS: SIGNIFICANT CHANGE UP
CULTURE RESULTS: SIGNIFICANT CHANGE UP
GLUCOSE SERPL-MCNC: 86 MG/DL — SIGNIFICANT CHANGE UP (ref 70–99)
HCT VFR BLD CALC: 33.7 % — LOW (ref 34.5–45)
HGB BLD-MCNC: 11.7 G/DL — SIGNIFICANT CHANGE UP (ref 11.5–15.5)
MCHC RBC-ENTMCNC: 28.8 PG — SIGNIFICANT CHANGE UP (ref 27–34)
MCHC RBC-ENTMCNC: 34.7 GM/DL — SIGNIFICANT CHANGE UP (ref 32–36)
MCV RBC AUTO: 83 FL — SIGNIFICANT CHANGE UP (ref 80–100)
NRBC # BLD: 0 /100 WBCS — SIGNIFICANT CHANGE UP (ref 0–0)
PLATELET # BLD AUTO: 522 K/UL — HIGH (ref 150–400)
POTASSIUM SERPL-MCNC: 3.1 MMOL/L — LOW (ref 3.5–5.3)
POTASSIUM SERPL-SCNC: 3.1 MMOL/L — LOW (ref 3.5–5.3)
PROT SERPL-MCNC: 6.6 G/DL — SIGNIFICANT CHANGE UP (ref 6–8.3)
RBC # BLD: 4.06 M/UL — SIGNIFICANT CHANGE UP (ref 3.8–5.2)
RBC # FLD: 14.1 % — SIGNIFICANT CHANGE UP (ref 10.3–14.5)
SODIUM SERPL-SCNC: 136 MMOL/L — SIGNIFICANT CHANGE UP (ref 135–145)
SPECIMEN SOURCE: SIGNIFICANT CHANGE UP
SPECIMEN SOURCE: SIGNIFICANT CHANGE UP
WBC # BLD: 19.19 K/UL — HIGH (ref 3.8–10.5)
WBC # FLD AUTO: 19.19 K/UL — HIGH (ref 3.8–10.5)

## 2020-11-17 PROCEDURE — 96375 TX/PRO/DX INJ NEW DRUG ADDON: CPT

## 2020-11-17 PROCEDURE — 86850 RBC ANTIBODY SCREEN: CPT

## 2020-11-17 PROCEDURE — 87086 URINE CULTURE/COLONY COUNT: CPT

## 2020-11-17 PROCEDURE — 85730 THROMBOPLASTIN TIME PARTIAL: CPT

## 2020-11-17 PROCEDURE — 84702 CHORIONIC GONADOTROPIN TEST: CPT

## 2020-11-17 PROCEDURE — 86900 BLOOD TYPING SEROLOGIC ABO: CPT

## 2020-11-17 PROCEDURE — 85610 PROTHROMBIN TIME: CPT

## 2020-11-17 PROCEDURE — 85379 FIBRIN DEGRADATION QUANT: CPT

## 2020-11-17 PROCEDURE — 80053 COMPREHEN METABOLIC PANEL: CPT

## 2020-11-17 PROCEDURE — 71045 X-RAY EXAM CHEST 1 VIEW: CPT

## 2020-11-17 PROCEDURE — 86769 SARS-COV-2 COVID-19 ANTIBODY: CPT

## 2020-11-17 PROCEDURE — 93005 ELECTROCARDIOGRAM TRACING: CPT

## 2020-11-17 PROCEDURE — 82248 BILIRUBIN DIRECT: CPT

## 2020-11-17 PROCEDURE — 76705 ECHO EXAM OF ABDOMEN: CPT

## 2020-11-17 PROCEDURE — 93970 EXTREMITY STUDY: CPT

## 2020-11-17 PROCEDURE — 74176 CT ABD & PELVIS W/O CONTRAST: CPT

## 2020-11-17 PROCEDURE — 85027 COMPLETE CBC AUTOMATED: CPT

## 2020-11-17 PROCEDURE — 82150 ASSAY OF AMYLASE: CPT

## 2020-11-17 PROCEDURE — 82247 BILIRUBIN TOTAL: CPT

## 2020-11-17 PROCEDURE — U0003: CPT

## 2020-11-17 PROCEDURE — C2625: CPT

## 2020-11-17 PROCEDURE — 99285 EMERGENCY DEPT VISIT HI MDM: CPT

## 2020-11-17 PROCEDURE — 74177 CT ABD & PELVIS W/CONTRAST: CPT

## 2020-11-17 PROCEDURE — 84145 PROCALCITONIN (PCT): CPT

## 2020-11-17 PROCEDURE — 84132 ASSAY OF SERUM POTASSIUM: CPT

## 2020-11-17 PROCEDURE — 83690 ASSAY OF LIPASE: CPT

## 2020-11-17 PROCEDURE — 96361 HYDRATE IV INFUSION ADD-ON: CPT

## 2020-11-17 PROCEDURE — 36415 COLL VENOUS BLD VENIPUNCTURE: CPT

## 2020-11-17 PROCEDURE — 76000 FLUOROSCOPY <1 HR PHYS/QHP: CPT

## 2020-11-17 PROCEDURE — 99053 MED SERV 10PM-8AM 24 HR FAC: CPT

## 2020-11-17 PROCEDURE — 83605 ASSAY OF LACTIC ACID: CPT

## 2020-11-17 PROCEDURE — 85025 COMPLETE CBC W/AUTO DIFF WBC: CPT

## 2020-11-17 PROCEDURE — 86901 BLOOD TYPING SEROLOGIC RH(D): CPT

## 2020-11-17 PROCEDURE — C1769: CPT

## 2020-11-17 PROCEDURE — 80048 BASIC METABOLIC PNL TOTAL CA: CPT

## 2020-11-17 PROCEDURE — 96374 THER/PROPH/DIAG INJ IV PUSH: CPT

## 2020-11-17 PROCEDURE — 96376 TX/PRO/DX INJ SAME DRUG ADON: CPT

## 2020-11-17 PROCEDURE — 81003 URINALYSIS AUTO W/O SCOPE: CPT

## 2020-11-17 PROCEDURE — 99239 HOSP IP/OBS DSCHRG MGMT >30: CPT

## 2020-11-17 PROCEDURE — 81001 URINALYSIS AUTO W/SCOPE: CPT

## 2020-11-17 PROCEDURE — 87040 BLOOD CULTURE FOR BACTERIA: CPT

## 2020-11-17 PROCEDURE — 99233 SBSQ HOSP IP/OBS HIGH 50: CPT

## 2020-11-17 PROCEDURE — 71275 CT ANGIOGRAPHY CHEST: CPT

## 2020-11-17 RX ORDER — ACETAMINOPHEN 500 MG
2 TABLET ORAL
Qty: 0 | Refills: 0 | DISCHARGE
Start: 2020-11-17

## 2020-11-17 RX ORDER — OXYCODONE HYDROCHLORIDE 5 MG/1
1 TABLET ORAL
Qty: 0 | Refills: 0 | DISCHARGE

## 2020-11-17 RX ORDER — POLYETHYLENE GLYCOL 3350 17 G/17G
17 POWDER, FOR SOLUTION ORAL
Qty: 238 | Refills: 0
Start: 2020-11-17 | End: 2020-11-30

## 2020-11-17 RX ORDER — IBUPROFEN 200 MG
2 TABLET ORAL
Qty: 0 | Refills: 0 | DISCHARGE

## 2020-11-17 RX ORDER — POTASSIUM CHLORIDE 20 MEQ
40 PACKET (EA) ORAL EVERY 4 HOURS
Refills: 0 | Status: DISCONTINUED | OUTPATIENT
Start: 2020-11-17 | End: 2020-11-17

## 2020-11-17 RX ORDER — PANTOPRAZOLE SODIUM 20 MG/1
1 TABLET, DELAYED RELEASE ORAL
Qty: 30 | Refills: 0
Start: 2020-11-17 | End: 2020-12-16

## 2020-11-17 RX ADMIN — OXYCODONE AND ACETAMINOPHEN 1 TABLET(S): 5; 325 TABLET ORAL at 02:37

## 2020-11-17 RX ADMIN — ENOXAPARIN SODIUM 40 MILLIGRAM(S): 100 INJECTION SUBCUTANEOUS at 11:57

## 2020-11-17 RX ADMIN — OXYCODONE AND ACETAMINOPHEN 1 TABLET(S): 5; 325 TABLET ORAL at 13:00

## 2020-11-17 RX ADMIN — POLYETHYLENE GLYCOL 3350 17 GRAM(S): 17 POWDER, FOR SOLUTION ORAL at 11:57

## 2020-11-17 RX ADMIN — Medication 40 MILLIEQUIVALENT(S): at 12:31

## 2020-11-17 RX ADMIN — OXYCODONE AND ACETAMINOPHEN 1 TABLET(S): 5; 325 TABLET ORAL at 12:01

## 2020-11-17 RX ADMIN — PANTOPRAZOLE SODIUM 40 MILLIGRAM(S): 20 TABLET, DELAYED RELEASE ORAL at 05:23

## 2020-11-17 RX ADMIN — OXYCODONE AND ACETAMINOPHEN 1 TABLET(S): 5; 325 TABLET ORAL at 03:37

## 2020-11-17 NOTE — PROGRESS NOTE ADULT - SUBJECTIVE AND OBJECTIVE BOX
Patient seen and examined at bedside. States she feels better. States she still has some soreness, but pain better controlled. States she had a good BM yesterday. Patient wants to go home.    Physical Exam:  General: WN/WD NAD  Neurology: A&Ox3, nonfocal, GREEN x 4  Respiratory: CTA B/L  CV: RRR, S1S2  Abdominal: Soft, +TTP (deep palpation) epigastric region, no rebound, no guarding, ND +BS  Extremities: No edema, + peripheral pulses    Please refer to updated D/C note for further details

## 2020-11-17 NOTE — PROGRESS NOTE ADULT - SUBJECTIVE AND OBJECTIVE BOX
[facility]  INFECTIOUS DISEASES   [address] [place] [address]  Tel: [phone]     Fax: [phone]  =======================================================    MRN-[id number]  DAVID DRISCOLL   Seen few hours earlier.     Follow up; Pancreatitis    Clinically has improved significantly. Liver enzymes and Amylase normalizing, but leukocytosis is still 19k but afebrile.   Pain in epigastric area is better, on regular diet.     PAST MEDICAL & SURGICAL HISTORY:  H/O cholecystitis  History of cholestasis during pregnancy  S/P D&amp;C (status post dilation and curettage)  x2 for miscarriages  History of cholecystectomy    Social Hx: no smoking, ETOH or drugs     FAMILY HISTORY:  No pertinent family history in first degree relatives    Allergies  cephalosporins (Rash (Moderate))  Cipro (Rash (Moderate))    Antibiotics:  None      REVIEW OF SYSTEMS:  CONSTITUTIONAL:  No Fever or chills  HEENT:  No diplopia or blurred vision.  No sore throat or runny nose.  CARDIOVASCULAR:  No chest pain or SOB.  RESPIRATORY:  No cough, shortness of breath, PND or orthopnea.  GASTROINTESTINAL:  No nausea, vomiting or diarrhea. + abdominal pain   GENITOURINARY:  No dysuria, frequency or urgency. No Blood in urine  MUSCULOSKELETAL:  no joint aches, no muscle pain  SKIN:  No change in skin, hair or nails.  NEUROLOGIC:  No paresthesias, fasciculations, seizures or weakness.  PSYCHIATRIC:  No disorder of thought or mood.  ENDOCRINE:  No heat or cold intolerance, polyuria or polydipsia.  HEMATOLOGICAL:  No easy bruising or bleeding.     Physical Exam:  Vital Signs Last 24 Hrs  T(C): 36.9 (17 Nov 2020 13:14), Max: 37.2 (17 Nov 2020 06:00)  T(F): 98.4 (17 Nov 2020 13:14), Max: 98.9 (17 Nov 2020 06:00)  HR: 71 (17 Nov 2020 13:14) (71 - 99)  BP: 111/69 (17 Nov 2020 13:14) (99/63 - 111/69)  BP(mean): --  RR: 18 (17 Nov 2020 13:14) (18 - 18)  SpO2: 97% (17 Nov 2020 13:14) (94% - 97%)  GEN: NAD  HEENT: normocephalic and atraumatic. EOMI. PERRL.    NECK: Supple.  No lymphadenopathy   LUNGS: Clear to auscultation.  HEART: Regular rate and rhythm without murmur.  ABDOMEN: Soft, epigastric tenderness better, nondistended.  Positive bowel sounds.    : No CVA tenderness  EXTREMITIES: Without any cyanosis, clubbing, rash, lesions or edema.  NEUROLOGIC: grossly intact.  PSYCHIATRIC: Appropriate affect .  SKIN: No ulceration or induration present.    Labs:                        11.7   19.19 )-----------( 522      ( 17 Nov 2020 09:52 )             33.7      11-17    136  |  100  |  4<L>  ----------------------------<  86  3.1<L>   |  26  |  0.46<L>    Ca    8.6      17 Nov 2020 09:52    TPro  6.6  /  Alb  2.2<L>  /  TBili  0.7  /  DBili  x   /  AST  31  /  ALT  52  /  AlkPhos  401<H>  11-17    Culture - Urine (collected 11-15-20 @ 05:15)  Source: .Urine Clean Catch (Midstream)  Final Report (11-16-20 @ 08:19):    <10,000 CFU/mL Normal Urogenital Megan    Culture - Blood (collected 11-15-20 @ 00:35)  Source: .Blood Blood-Peripheral    Culture - Blood (collected 11-15-20 @ 00:35)  Source: .Blood Blood-Peripheral    Culture - Blood (collected 11-12-20 @ 18:44)  Source: .Blood Blood-Peripheral 2anaerobic    Culture - Blood (collected 11-12-20 @ 18:44)  Source: .Blood Blood-Peripheral    Culture - Urine (collected 11-11-20 @ 19:10)  Source: .Urine Clean Catch (Midstream)  Final Report (11-12-20 @ 15:13):    <10,000 CFU/mL Normal Urogenital Megan    WBC Count: 19.19 K/uL (11-17-20 @ 09:52)  WBC Count: 19.91 K/uL (11-16-20 @ 09:39)  WBC Count: 16.89 K/uL (11-15-20 @ 09:18)  WBC Count: 16.54 K/uL (11-14-20 @ 21:19)  WBC Count: 17.24 K/uL (11-14-20 @ 09:32)  WBC Count: 15.58 K/uL (11-13-20 @ 08:11)    Creatinine, Serum: 0.46 mg/dL (11-17-20 @ 09:52)  Creatinine, Serum: 0.44 mg/dL (11-16-20 @ 09:39)  Creatinine, Serum: 0.43 mg/dL (11-15-20 @ 09:18)  Creatinine, Serum: 0.49 mg/dL (11-14-20 @ 09:32)  Creatinine, Serum: 0.35 mg/dL (11-13-20 @ 08:11)    Procalcitonin, Serum: <0.05 (11-09-20 @ 08:38)     COVID-19 IgG Antibody Index: 0.09 Index (11-08-20 @ 14:09)  COVID-19 IgG Antibody Interpretation: Negative (11-08-20 @ 14:09)  COVID-19 PCR: NotDetec (11-08-20 @ 06:03)    All imaging and other data have been reviewed.  < from: CT Abdomen and Pelvis No Cont (11.10.20 @ 01:36) >  IMPRESSION:  Status post cholecystectomy.  Findings compatible with acute interstitial pancreatitis.  The evaluation for pancreatic necrosis is limited without intravenous contrast.  Free intraperitoneal air, likely related to recent postoperative status.  Small amount of free fluid.    Assessment and Plan:   25 T/o woman with PMH of cholestasis of pregnancy 2 years ago, cholecystitis s/p cholecystectomy on 11/6 in [place], was admitted with acute RUQ pain on 11/8. She was diagnosed with retained CBD stone and had ERCP for remove stone with stent placement on 11/9. After procedure she had pancreatitis with severe epigastric pain, lipase went up to >14887. The same day had leukocytosis as well.   I believe leukocytosis is reactional to inflammation in pancreas, as lipase is going down and inflammation is resolving, WBC will start trending down as well. If leukocytosis continues with persistent fever or any positive culture will start her on ABx otherwise will watch off antibiotics.     11/16: had fever 2 days ago, leukocytosis worse. CT with multiple collections around pancreas, no necrosis, clinically better on regular food with no nausea or vomiting.     Pancreatitis, CBD stone s/p ERCP and stent   - Blood and urine cultures are NGTD  - Will follow leukocytosis   - Liver enzymes are normalizing  - Amylase is normal   - Repeat CT with pancreatitis and edema no necrosis   - Will watch off antibiotics   - GI and surgery close follow up and repeat CT.     Will sign off please call with any question.     Ting Olea MD  Division of Infectious Diseases   Cell [phone] between 8am and 6pm

## 2020-11-17 NOTE — PROGRESS NOTE ADULT - SUBJECTIVE AND OBJECTIVE BOX
INTERVAL HPI/OVERNIGHT EVENTS:  pt seen and examined  pain improving   denies n/v  +bm yesterday  tameka diet  switched to oral pain meds, required x2 overnight    MEDICATIONS  (STANDING):  enoxaparin Injectable 40 milliGRAM(s) SubCutaneous daily  pantoprazole    Tablet 40 milliGRAM(s) Oral before breakfast  polyethylene glycol 3350 17 Gram(s) Oral daily  saline laxative (FLEET) Rectal Enema 1 Enema Rectal once    MEDICATIONS  (PRN):  acetaminophen   Tablet .. 650 milliGRAM(s) Oral every 6 hours PRN Temp greater or equal to 38C (100.4F)  acetaminophen   Tablet .. 650 milliGRAM(s) Oral every 6 hours PRN Mild Pain (1 - 3)  ondansetron Injectable 4 milliGRAM(s) IV Push every 4 hours PRN Nausea and/or Vomiting  oxycodone    5 mG/acetaminophen 325 mG 1 Tablet(s) Oral every 6 hours PRN Severe Pain (7 - 10)  oxycodone    5 mG/acetaminophen 325 mG 0.5 Tablet(s) Oral every 6 hours PRN Moderate Pain (4 - 6)      Allergies    cephalosporins (Rash (Moderate))  Cipro (Rash (Moderate))    Intolerances        Review of Systems:    General:  No wt loss, fevers, chills, night sweats, fatigue   Eyes:  Good vision, no reported pain  ENT:  No sore throat, pain, runny nose, dysphagia  CV:  No pain, palpitations, hypo/hypertension  Resp:  No dyspnea, cough, tachypnea, wheezing  GI:  see above  :  No pain, bleeding, incontinence, nocturia  Muscle:  No pain, weakness  Neuro:  No weakness, tingling, memory problems  Psych:  No fatigue, insomnia, mood problems, depression  Endocrine:  No polyuria, polydypsia, cold/heat intolerance  Heme:  No petechiae, ecchymosis, easy bruisability  Skin:  No rash, tattoos, scars, edema      Vital Signs Last 24 Hrs  T(C): 37.2 (17 Nov 2020 06:00), Max: 37.6 (16 Nov 2020 14:04)  T(F): 98.9 (17 Nov 2020 06:00), Max: 99.6 (16 Nov 2020 14:04)  HR: 99 (17 Nov 2020 06:00) (77 - 107)  BP: 106/72 (17 Nov 2020 06:00) (99/63 - 106/72)  BP(mean): --  RR: 18 (17 Nov 2020 06:00) (18 - 18)  SpO2: 94% (17 Nov 2020 06:00) (94% - 96%)    PHYSICAL EXAM:    Constitutional: lying in bed  HEENT: ncat  Neck: ncat  Gastrointestinal: soft mild mid abd ttp mild dt  Extremities: No peripheral edema  Vascular: + peripheral pulses  Neurological: A/O x 3      LABS:                        11.0   19.91 )-----------( 506      ( 16 Nov 2020 09:39 )             33.2     11-16    134<L>  |  99  |  4<L>  ----------------------------<  73  3.3<L>   |  23  |  0.44<L>    Ca    8.6      16 Nov 2020 09:39    TPro  6.3  /  Alb  2.0<L>  /  TBili  1.0  /  DBili  x   /  AST  37  /  ALT  65  /  AlkPhos  395<H>  11-15          RADIOLOGY & ADDITIONAL TESTS:

## 2020-11-17 NOTE — DISCHARGE NOTE NURSING/CASE MANAGEMENT/SOCIAL WORK - PATIENT PORTAL LINK FT
You can access the FollowMyHealth Patient Portal offered by [facility] by registering at the following website: [email or web address]. By joining [facility]’s FollowMyHealth portal, you will also be able to view your health information using other applications (apps) compatible with our system.

## 2020-11-17 NOTE — PROGRESS NOTE ADULT - ATTENDING COMMENTS
Advanced care planning was discussed with patient and family.  Advanced care planning forms were reviewed and discussed.  Risks, benefits and alternatives of gastroenterologic procedures were discussed in detail and all questions were answered.    30 minutes spent.
25F, hx cholestasis of pregnancy 2018, hx cholecystitis/cholecystectomy at [place] 2d PTA; adm w/postop abd pain assoc w/n/v, elev LFTs - mgmt for 5mm retained CBD stone, s/p ERCP, pancreatitis w/lipase >18K, volume depletion/hypoTN    abd pain - multifactorial  -abd pain, elev LFTs - in setting of retained CBD stone - s/p ERCP/stone removal/stent placement - LFTs steadily downtrended, almost normalized - will need o/p GI followup for stent removal  -post-ERCP pancreatitis - assoc w/elev lipase >18K and pancreatic edema noted on CTAP 11/10 - lipase normalized on labs today - continue trending for stability  -abd pain decreasing, but still requiring intermittent IV analgesic tx  -currently tolerating clear liquid diet - will maintain on clear liquids for now, as still w/ttp on exam, and ongoing IV analgesic requirements    volume depletion - assoc w/hypoTN, tachycardia - BP measures remain borderline, HR 110s range   -continue IVFs while dietary intake remains modified  -bolus x 1L now, then continued maintenance    leukocytosis - no rebecca e/o acute infection - likely reactive in etiology  -continue monitoring off abxs  -trending w/daily labs  -monitoring fever curve  -f/u cxs    dvt prophy
I personally conducted a physical examination of the patient. I personally gathered the patient's history. I edited the above listed findings which were prepared by the listed resident physician. I personally discussed the plan of care with the patient. The questions and concerns were addressed to the best of my ability. The patient is in agreement with the listed treatment plan.     ercp today  dc planning pending findings
26 YO F with PMHX cholestasis of pregnancy (2 years ago), cholecystitis s/p cholecystectomy (POD#4 at [place]), presented with abd pain and N/V postoperatively, admitted for 5mm retained CBD stone, now presenting with pancreatitis s/p ERCP on 11/9 Plan: apprec gi and gen surg collaboration in care, apprec palliative recs for pain control, trend lipase, adat as per gi/gen surg, check and f/u ekg for QTc, cont zofran and reglan for vomiting if QTc ok, monitor clinical course

## 2020-11-17 NOTE — PROGRESS NOTE ADULT - REASON FOR ADMISSION
retained CBD stone s/p cholecystectomy

## 2020-11-17 NOTE — PROGRESS NOTE ADULT - PROBLEM SELECTOR PLAN 1
post ercp pancreatitis; clinically improved  repeat ct w multiple peripancreatic fluid collections wo necrosis  id input appreciated  cont ppi  cont bowel regimen  prn pain control; trend labs; oob   low fat diet as tolerated  will need interval imaging in 4-6 wks and repeat ercp with stent removal as op; dw pt  no objection to dc planning

## 2020-11-20 LAB
CULTURE RESULTS: SIGNIFICANT CHANGE UP
CULTURE RESULTS: SIGNIFICANT CHANGE UP
SPECIMEN SOURCE: SIGNIFICANT CHANGE UP
SPECIMEN SOURCE: SIGNIFICANT CHANGE UP

## 2020-12-01 ENCOUNTER — OUTPATIENT (OUTPATIENT)
Dept: OUTPATIENT SERVICES | Facility: HOSPITAL | Age: 25
LOS: 1 days | End: 2020-12-01

## 2020-12-01 DIAGNOSIS — Z90.49 ACQUIRED ABSENCE OF OTHER SPECIFIED PARTS OF DIGESTIVE TRACT: Chronic | ICD-10-CM

## 2020-12-01 DIAGNOSIS — Z98.890 OTHER SPECIFIED POSTPROCEDURAL STATES: Chronic | ICD-10-CM

## 2020-12-08 NOTE — PROGRESS NOTE ADULT - SUBJECTIVE AND OBJECTIVE BOX
[facility]  INFECTIOUS DISEASES   [address] [place] [address]  Tel: [phone]     Fax: [phone]  =======================================================    MRN-[id number]  DAVID DRISCOLL     Follow up; Pancreatitis    Liver enzymes and Amylase normalizing, pain is better, leukocytosis is the same. Cultures so far negative.     PAST MEDICAL & SURGICAL HISTORY:  H/O cholecystitis  History of cholestasis during pregnancy  S/P D&amp;C (status post dilation and curettage)  x2 for miscarriages  History of cholecystectomy    Social Hx: no smoking, ETOH or drugs     FAMILY HISTORY:  No pertinent family history in first degree relatives    Allergies  cephalosporins (Rash (Moderate))  Cipro (Rash (Moderate))    Antibiotics:  None      REVIEW OF SYSTEMS:  CONSTITUTIONAL:  No Fever or chills  HEENT:  No diplopia or blurred vision.  No sore throat or runny nose.  CARDIOVASCULAR:  No chest pain or SOB.  RESPIRATORY:  No cough, shortness of breath, PND or orthopnea.  GASTROINTESTINAL:  No nausea, vomiting or diarrhea. + abdominal pain   GENITOURINARY:  No dysuria, frequency or urgency. No Blood in urine  MUSCULOSKELETAL:  no joint aches, no muscle pain  SKIN:  No change in skin, hair or nails.  NEUROLOGIC:  No paresthesias, fasciculations, seizures or weakness.  PSYCHIATRIC:  No disorder of thought or mood.  ENDOCRINE:  No heat or cold intolerance, polyuria or polydipsia.  HEMATOLOGICAL:  No easy bruising or bleeding.     Physical Exam:  Vital Signs Last 24 Hrs  T(C): 37.4 (13 Nov 2020 12:44), Max: 37.4 (13 Nov 2020 12:44)  T(F): 99.4 (13 Nov 2020 12:44), Max: 99.4 (13 Nov 2020 12:44)  HR: 111 (13 Nov 2020 12:44) (105 - 111)  BP: 99/67 (13 Nov 2020 12:44) (99/67 - 108/71)  RR: 17 (13 Nov 2020 12:44) (17 - 18)  SpO2: 97% (13 Nov 2020 12:44) (91% - 97%)  GEN: NAD  HEENT: normocephalic and atraumatic. EOMI. PERRL.    NECK: Supple.  No lymphadenopathy   LUNGS: Clear to auscultation.  HEART: Regular rate and rhythm without murmur.  ABDOMEN: Soft, epigastric tenderness better, nondistended.  Positive bowel sounds.    : No CVA tenderness  EXTREMITIES: Without any cyanosis, clubbing, rash, lesions or edema.  NEUROLOGIC: grossly intact.  PSYCHIATRIC: Appropriate affect .  SKIN: No ulceration or induration present.    Labs:                        10.7   15.58 )-----------( 317      ( 13 Nov 2020 08:11 )             30.9      11-13    136  |  104  |  5<L>  ----------------------------<  67<L>  3.7   |  23  |  0.35<L>    Ca    8.3<L>      13 Nov 2020 08:11    TPro  5.2<L>  /  Alb  1.7<L>  /  TBili  0.8  /  DBili  x   /  AST  26  /  ALT  71  /  AlkPhos  172<H>  11-13    Culture - Urine (collected 11-11-20 @ 19:10)  Source: .Urine Clean Catch (Midstream)  Final Report (11-12-20 @ 15:13):    <10,000 CFU/mL Normal Urogenital Megan    WBC Count: 15.58 K/uL (11-13-20 @ 08:11)  WBC Count: 14.83 K/uL (11-12-20 @ 08:50)  WBC Count: 15.08 K/uL (11-11-20 @ 09:18)  WBC Count: 16.35 K/uL (11-10-20 @ 08:47)  WBC Count: 7.95 K/uL (11-09-20 @ 08:38)    Creatinine, Serum: 0.35 mg/dL (11-13-20 @ 08:11)  Creatinine, Serum: 0.60 mg/dL (11-12-20 @ 08:50)  Creatinine, Serum: 0.69 mg/dL (11-11-20 @ 09:18)  Creatinine, Serum: 0.77 mg/dL (11-10-20 @ 08:47)  Creatinine, Serum: 0.58 mg/dL (11-09-20 @ 08:38)    Procalcitonin, Serum: <0.05 (11-09-20 @ 08:38)     COVID-19 IgG Antibody Index: 0.09 Index (11-08-20 @ 14:09)  COVID-19 IgG Antibody Interpretation: Negative (11-08-20 @ 14:09)  COVID-19 PCR: NotDetec (11-08-20 @ 06:03)    All imaging and other data have been reviewed.  < from: CT Abdomen and Pelvis No Cont (11.10.20 @ 01:36) >  IMPRESSION:  Status post cholecystectomy.  Findings compatible with acute interstitial pancreatitis.  The evaluation for pancreatic necrosis is limited without intravenous contrast.  Free intraperitoneal air, likely related to recent postoperative status.  Small amount of free fluid.    Assessment and Plan:   25 T/o woman with PMH of cholestasis of pregnancy 2 years ago, cholecystitis s/p cholecystectomy on 11/6 in [place], was admitted with acute RUQ pain on 11/8. She was diagnosed with retained CBD stone and had ERCP for remove stone with stent placement on 11/9. After procedure she had pancreatitis with severe epigastric pain, lipase went up to >26094. The same day had leukocytosis as well.   I believe leukocytosis is reactional to inflammation in pancreas, as lipase is going down and inflammation is resolving, WBC will start trending down as well. If leukocytosis continues with persistent fever or any positive culture will start her on ABx otherwise will watch off antibiotics.     Pancreatitis, CBD stone s/p ERCP and stent   - Blood and urine cultures are NGTD  - Will follow leukocytosis today 15k  - Liver enzymes are normalizing  - Amylase is normal now.  - If worsening will repeat abdominal imagining to rule out any collection or necrosis  - Will watch off antibiotics.   - GI on board    Will follow PRN.     Ting Olea MD  Division of Infectious Diseases   Cell [phone] between 8am and 6pm    20

## 2021-01-29 PROBLEM — Z87.19 PERSONAL HISTORY OF OTHER DISEASES OF THE DIGESTIVE SYSTEM: Chronic | Status: ACTIVE | Noted: 2020-11-08

## 2021-01-29 PROBLEM — Z87.59 PERSONAL HISTORY OF OTHER COMPLICATIONS OF PREGNANCY, CHILDBIRTH AND THE PUERPERIUM: Chronic | Status: ACTIVE | Noted: 2020-11-08

## 2021-02-02 ENCOUNTER — OUTPATIENT (OUTPATIENT)
Dept: OUTPATIENT SERVICES | Facility: HOSPITAL | Age: 26
LOS: 1 days | End: 2021-02-02

## 2021-02-02 DIAGNOSIS — Z98.890 OTHER SPECIFIED POSTPROCEDURAL STATES: Chronic | ICD-10-CM

## 2021-02-02 DIAGNOSIS — Z90.49 ACQUIRED ABSENCE OF OTHER SPECIFIED PARTS OF DIGESTIVE TRACT: Chronic | ICD-10-CM

## 2021-02-04 ENCOUNTER — OUTPATIENT (OUTPATIENT)
Dept: OUTPATIENT SERVICES | Facility: HOSPITAL | Age: 26
LOS: 1 days | End: 2021-02-04

## 2021-02-04 ENCOUNTER — APPOINTMENT (OUTPATIENT)
Dept: ULTRASOUND IMAGING | Facility: CLINIC | Age: 26
End: 2021-02-04

## 2021-02-04 DIAGNOSIS — Z90.49 ACQUIRED ABSENCE OF OTHER SPECIFIED PARTS OF DIGESTIVE TRACT: Chronic | ICD-10-CM

## 2021-02-04 DIAGNOSIS — Z98.890 OTHER SPECIFIED POSTPROCEDURAL STATES: Chronic | ICD-10-CM

## 2021-02-06 ENCOUNTER — APPOINTMENT (OUTPATIENT)
Dept: ULTRASOUND IMAGING | Facility: CLINIC | Age: 26
End: 2021-02-06

## 2022-01-28 ENCOUNTER — TRANSCRIPTION ENCOUNTER (OUTPATIENT)
Age: 27
End: 2022-01-28

## 2022-05-12 NOTE — PROGRESS NOTE ADULT - PROBLEM SELECTOR PLAN 1
post ercp pancreatitis  clinically improved  however she is on dilaudid drip, unable to quantify her narcotic need  recommend dc drip and return PRN   dulcolax supp  bowel regimen  npo cont vimpat 200mg TID  cont valproic acid 750mg TID  con keppra 600mg BID

## 2024-06-14 NOTE — SBIRT NOTE ADULT - NSSBIRTUNABLEREM_GEN_A_CORE
Dr. Jose Cruz Rudolph, sleep medicine: [phone]  Please call [phone] if you develop thoughts of self harm.   
Never